# Patient Record
Sex: FEMALE | Race: WHITE | NOT HISPANIC OR LATINO | Employment: OTHER | URBAN - METROPOLITAN AREA
[De-identification: names, ages, dates, MRNs, and addresses within clinical notes are randomized per-mention and may not be internally consistent; named-entity substitution may affect disease eponyms.]

---

## 2020-07-10 ENCOUNTER — APPOINTMENT (OUTPATIENT)
Dept: RADIOLOGY | Facility: MEDICAL CENTER | Age: 65
End: 2020-07-10
Attending: EMERGENCY MEDICINE
Payer: MEDICARE

## 2020-07-10 ENCOUNTER — HOSPITAL ENCOUNTER (EMERGENCY)
Facility: MEDICAL CENTER | Age: 65
End: 2020-07-10
Attending: EMERGENCY MEDICINE
Payer: MEDICARE

## 2020-07-10 VITALS
HEART RATE: 84 BPM | BODY MASS INDEX: 32.52 KG/M2 | DIASTOLIC BLOOD PRESSURE: 79 MMHG | WEIGHT: 190.48 LBS | OXYGEN SATURATION: 93 % | SYSTOLIC BLOOD PRESSURE: 127 MMHG | HEIGHT: 64 IN | RESPIRATION RATE: 20 BRPM | TEMPERATURE: 97.8 F

## 2020-07-10 DIAGNOSIS — R55 SYNCOPE, UNSPECIFIED SYNCOPE TYPE: ICD-10-CM

## 2020-07-10 LAB
ANION GAP SERPL CALC-SCNC: 16 MMOL/L (ref 7–16)
BASOPHILS # BLD AUTO: 0.3 % (ref 0–1.8)
BASOPHILS # BLD: 0.02 K/UL (ref 0–0.12)
BUN SERPL-MCNC: 17 MG/DL (ref 8–22)
CALCIUM SERPL-MCNC: 9.9 MG/DL (ref 8.5–10.5)
CHLORIDE SERPL-SCNC: 105 MMOL/L (ref 96–112)
CO2 SERPL-SCNC: 23 MMOL/L (ref 20–33)
CREAT SERPL-MCNC: 0.7 MG/DL (ref 0.5–1.4)
EKG IMPRESSION: NORMAL
EOSINOPHIL # BLD AUTO: 0.1 K/UL (ref 0–0.51)
EOSINOPHIL NFR BLD: 1.7 % (ref 0–6.9)
ERYTHROCYTE [DISTWIDTH] IN BLOOD BY AUTOMATED COUNT: 46.4 FL (ref 35.9–50)
GLUCOSE SERPL-MCNC: 89 MG/DL (ref 65–99)
HCT VFR BLD AUTO: 47.6 % (ref 37–47)
HGB BLD-MCNC: 15.3 G/DL (ref 12–16)
IMM GRANULOCYTES # BLD AUTO: 0.02 K/UL (ref 0–0.11)
IMM GRANULOCYTES NFR BLD AUTO: 0.3 % (ref 0–0.9)
LYMPHOCYTES # BLD AUTO: 2.27 K/UL (ref 1–4.8)
LYMPHOCYTES NFR BLD: 37.8 % (ref 22–41)
MCH RBC QN AUTO: 29.4 PG (ref 27–33)
MCHC RBC AUTO-ENTMCNC: 32.1 G/DL (ref 33.6–35)
MCV RBC AUTO: 91.5 FL (ref 81.4–97.8)
MONOCYTES # BLD AUTO: 0.45 K/UL (ref 0–0.85)
MONOCYTES NFR BLD AUTO: 7.5 % (ref 0–13.4)
NEUTROPHILS # BLD AUTO: 3.15 K/UL (ref 2–7.15)
NEUTROPHILS NFR BLD: 52.4 % (ref 44–72)
NRBC # BLD AUTO: 0 K/UL
NRBC BLD-RTO: 0 /100 WBC
PLATELET # BLD AUTO: 258 K/UL (ref 164–446)
PMV BLD AUTO: 9.7 FL (ref 9–12.9)
POTASSIUM SERPL-SCNC: 4.1 MMOL/L (ref 3.6–5.5)
RBC # BLD AUTO: 5.2 M/UL (ref 4.2–5.4)
SODIUM SERPL-SCNC: 144 MMOL/L (ref 135–145)
WBC # BLD AUTO: 6 K/UL (ref 4.8–10.8)

## 2020-07-10 PROCEDURE — 93005 ELECTROCARDIOGRAM TRACING: CPT | Performed by: EMERGENCY MEDICINE

## 2020-07-10 PROCEDURE — 70498 CT ANGIOGRAPHY NECK: CPT

## 2020-07-10 PROCEDURE — 70496 CT ANGIOGRAPHY HEAD: CPT

## 2020-07-10 PROCEDURE — 700117 HCHG RX CONTRAST REV CODE 255: Performed by: EMERGENCY MEDICINE

## 2020-07-10 PROCEDURE — 93005 ELECTROCARDIOGRAM TRACING: CPT

## 2020-07-10 PROCEDURE — 99284 EMERGENCY DEPT VISIT MOD MDM: CPT

## 2020-07-10 PROCEDURE — 80048 BASIC METABOLIC PNL TOTAL CA: CPT

## 2020-07-10 PROCEDURE — 85025 COMPLETE CBC W/AUTO DIFF WBC: CPT

## 2020-07-10 RX ADMIN — IOHEXOL 80 ML: 350 INJECTION, SOLUTION INTRAVENOUS at 15:27

## 2020-07-10 NOTE — ED NOTES
"Chief Complaint   Patient presents with   • Loss of Vision     \" my eye loss pressure\" for 15seconds, described vision rolled and sideways both eyes   • Dizziness     during above vision problem     Pt ambulated to triage with above complaints happened 10:45. Pt asymptomatic now, she said only lasted for 15seconds. No other neurodeficit noted.   Pt feels anxious because last time this happed she had stroke and with loss vision her right eye.    "

## 2020-07-10 NOTE — ED NOTES
Pt clear for d/c. Educated on d/c instructions, verbalized understanding. No questions or concerned at time of d/c. Ambulatory, taken out of ED via wheelchair.

## 2020-07-10 NOTE — ED PROVIDER NOTES
"ED Provider Note    CHIEF COMPLAINT  Chief Complaint   Patient presents with   • Loss of Vision     \" my eye loss pressure\" for 15seconds, described vision rolled and sideways both eyes   • Dizziness     during above vision problem       HPI  Natividad Sewell is a 65 y.o. female who presents with an episode today where she briefly passed out.  Patient states she had just came inside her house and she felt like she got lightheaded and dizzy.  She went down on her hands and knees and thinks she briefly was unconscious.  She states that both eyes were losing vision at the same time.  She also described an unusual episode where her visual fields were rolling.  Patient denies vomiting or diarrhea.  Patient denies cough or cold symptoms.    REVIEW OF SYSTEMS  See HPI for further details.  No fever no chills.  No cough or cold symptoms.  No vomiting or diarrhea all other systems are negative.    PAST MEDICAL HISTORY  Past Medical History:   Diagnosis Date   • Arthritis    • Hypertension    • Migraines, neuralgic    • Mitral valve prolapse    • Stroke (HCC) 6/04/09       FAMILY HISTORY  History reviewed. No pertinent family history.    SOCIAL HISTORY  Social History     Socioeconomic History   • Marital status: Single     Spouse name: Not on file   • Number of children: Not on file   • Years of education: Not on file   • Highest education level: Not on file   Occupational History   • Not on file   Social Needs   • Financial resource strain: Not on file   • Food insecurity     Worry: Not on file     Inability: Not on file   • Transportation needs     Medical: Not on file     Non-medical: Not on file   Tobacco Use   • Smoking status: Current Every Day Smoker     Packs/day: 0.75     Years: 40.00     Pack years: 30.00     Types: Cigarettes   • Tobacco comment: 1/2 ppd    Substance and Sexual Activity   • Alcohol use: No   • Drug use: Yes     Types: Inhaled     Comment: marijuana   • Sexual activity: Not on file   Lifestyle   • " "Physical activity     Days per week: Not on file     Minutes per session: Not on file   • Stress: Not on file   Relationships   • Social connections     Talks on phone: Not on file     Gets together: Not on file     Attends Hoahaoism service: Not on file     Active member of club or organization: Not on file     Attends meetings of clubs or organizations: Not on file     Relationship status: Not on file   • Intimate partner violence     Fear of current or ex partner: Not on file     Emotionally abused: Not on file     Physically abused: Not on file     Forced sexual activity: Not on file   Other Topics Concern   • Not on file   Social History Narrative   • Not on file       SURGICAL HISTORY  Past Surgical History:   Procedure Laterality Date   • CAROTID ENDARTERECTOMY  6/6/2009    Performed by ALCIDES MERINO at SURGERY [facility]       CURRENT MEDICATIONS  Home Medications     Reviewed by Regi Hanks R.N. (Registered Nurse) on 07/10/20 at 1221  Med List Status: Complete   Medication Last Dose Status        Patient Shayan Taking any Medications                       ALLERGIES  Allergies   Allergen Reactions   • Morphine      \"I don't know, it was during my recovery after the endarterectomy-they told me I had a bad reaction to it\"       PHYSICAL EXAM  VITAL SIGNS: BP (!) 185/90   Pulse 92   Temp 36.6 °C (97.8 °F) (Temporal)   Resp 18   Ht 1.626 m (5' 4\")   Wt 86.4 kg (190 lb 7.6 oz)   SpO2 94%   BMI 32.70 kg/m²   Constitutional: Well developed, Well nourished, No acute distress,   HENT: Normocephalic, Atraumatic, Bilateral external ears normal, Oropharynx moist, No oral exudates, Nose normal.  Positive right carotid endarterectomy scar  Eyes: BHARTI, EOMI, Conjunctiva normal, No discharge.   Neck: Normal range of motion, No tenderness, Supple, No stridor. No nuchal rigidity.  Positive scars noted  Lymphatic: No lymphadenopathy noted.   Cardiovascular: Regular rate and rhythm without murmur rub or " gallop  Thorax & Lungs: Clear without wheezing  Abdomen: Bowel sounds normal, Soft, No tenderness, No masses, No pulsatile masses.   Skin: Warm, Dry, No erythema, No rash.   Back: No tenderness, No CVA tenderness.   Extremities: No edema, No tenderness, No cyanosis, No clubbing. Dorsalis pedis pulses 2+ equal bilaterally. Radial pulses 2+ equal bilaterally.  Neurologic: Alert & oriented x 3, Normal motor function, Normal sensory function, No focal deficits noted.     EKG  This rhythm at a rate of 75.  Normal P waves.  Normal QRS.  Normal R wave progression.  Borderline EKG    RADIOLOGY/PROCEDURES  CT-CTA HEAD WITH & W/O-POST PROCESS   Final Result      1.  CT angiogram of the [place] within normal limits.   2.  Moderate-sized area of encephalomalacia in the right cerebral artery territory consistent with chronic infarct.      CT-CTA NECK WITH & W/O-POST PROCESSING   Final Result      1.  There is atherosclerosis throughout both common carotid arteries and internal carotid artery origins with estimated 75% stenosis of the distal right common carotid artery just proximal to the bulb.   2.  There is estimated 50% stenosis of the right ICA origin and 50-69% stenosis of the left ICA origin.        This  Results for orders placed or performed during the hospital encounter of 07/10/20   CBC WITH DIFFERENTIAL   Result Value Ref Range    WBC 6.0 4.8 - 10.8 K/uL    RBC 5.20 4.20 - 5.40 M/uL    Hemoglobin 15.3 12.0 - 16.0 g/dL    Hematocrit 47.6 (H) 37.0 - 47.0 %    MCV 91.5 81.4 - 97.8 fL    MCH 29.4 27.0 - 33.0 pg    MCHC 32.1 (L) 33.6 - 35.0 g/dL    RDW 46.4 35.9 - 50.0 fL    Platelet Count 258 164 - 446 K/uL    MPV 9.7 9.0 - 12.9 fL    Neutrophils-Polys 52.40 44.00 - 72.00 %    Lymphocytes 37.80 22.00 - 41.00 %    Monocytes 7.50 0.00 - 13.40 %    Eosinophils 1.70 0.00 - 6.90 %    Basophils 0.30 0.00 - 1.80 %    Immature Granulocytes 0.30 0.00 - 0.90 %    Nucleated RBC 0.00 /100 WBC    Neutrophils (Absolute) 3.15  2.00 - 7.15 K/uL    Lymphs (Absolute) 2.27 1.00 - 4.80 K/uL    Monos (Absolute) 0.45 0.00 - 0.85 K/uL    Eos (Absolute) 0.10 0.00 - 0.51 K/uL    Baso (Absolute) 0.02 0.00 - 0.12 K/uL    Immature Granulocytes (abs) 0.02 0.00 - 0.11 K/uL    NRBC (Absolute) 0.00 K/uL   BASIC METABOLIC PANEL   Result Value Ref Range    Sodium 144 135 - 145 mmol/L    Potassium 4.1 3.6 - 5.5 mmol/L    Chloride 105 96 - 112 mmol/L    Co2 23 20 - 33 mmol/L    Glucose 89 65 - 99 mg/dL    Bun 17 8 - 22 mg/dL    Creatinine 0.70 0.50 - 1.40 mg/dL    Calcium 9.9 8.5 - 10.5 mg/dL    Anion Gap 16.0 7.0 - 16.0   ESTIMATED GFR   Result Value Ref Range    GFR If African American >60 >60 mL/min/1.73 m 2    GFR If Non African American >60 >60 mL/min/1.73 m 2   EKG   Result Value Ref Range    Report       [facility] Emergency Dept.    Test Date:  2020-07-10  Pt Name:    URBAN AGUIAR                  Department: ER  MRN:        [id number]                      Room:  Gender:     Female                       Technician: [id number]  :        [id number]                   Requested By:ER TRIAGE PROTOCOL  Order #:    [id number]                    Reading MD: BULMARO DALTON MD    Measurements  Intervals                                Axis  Rate:       76                           P:          51  DE:         148                          QRS:        54  QRSD:       72                           T:          46  QT:         372  QTc:        419    Interpretive Statements  SINUS RHYTHM  LEFT ATRIAL ABNORMALITY  Compared to ECG 03/15/2012 11:11:24  Atrial abnormality now present  Electronically Signed On 7- 16:30:12 PDT by BULMARO DALTON MD           COURSE & MEDICAL DECISION MAKING  Pertinent Labs & Imaging studies reviewed. (See chart for details)  Patient was concerned that she has had a recurrent stroke however her symptoms are more consistent with a syncope versus near syncopal episode.  CT scan did not show any signs of stroke.  There was  some nonsurgical carotid disease.  Patient continues to smoke and is no longer on antiplatelet agent.  Patient was started on a full dose aspirin once a day after consultation with Dr. Meyer the vascular surgeon.  He stated that he would see her next week.  I counseled the patient about smoking cessation.  Patient was discharged home in stable condition      FINAL IMPRESSION  1.  Syncope  2.   3.      Please note that this dictation was created using voice recognition software. I have worked with consultants from the vendor as well as technical experts from [facility] [employer] to optimize the interface. I have made every reasonable attempt to correct obvious errors, but I expect that there are errors of grammar and possibly content that I did not discover before finalizing the note.      Electronically signed by: Hank Chavez M.D., 7/10/2020 4:32 PM

## 2020-08-17 RX ORDER — SODIUM CHLORIDE 9 MG/ML
INJECTION, SOLUTION INTRAVENOUS CONTINUOUS
Status: CANCELLED | OUTPATIENT
Start: 2020-09-16

## 2020-09-11 ENCOUNTER — PRE-ADMISSION TESTING (OUTPATIENT)
Dept: ADMISSIONS | Facility: MEDICAL CENTER | Age: 65
End: 2020-09-11
Attending: SURGERY
Payer: MEDICARE

## 2020-09-11 DIAGNOSIS — Z01.812 PRE-OPERATIVE LABORATORY EXAMINATION: ICD-10-CM

## 2020-09-11 LAB
CREAT SERPL-MCNC: 0.54 MG/DL (ref 0.5–1.4)
ERYTHROCYTE [DISTWIDTH] IN BLOOD BY AUTOMATED COUNT: 45.4 FL (ref 35.9–50)
HCT VFR BLD AUTO: 47.5 % (ref 37–47)
HGB BLD-MCNC: 15.6 G/DL (ref 12–16)
INR PPP: 0.89 (ref 0.87–1.13)
MCH RBC QN AUTO: 29.7 PG (ref 27–33)
MCHC RBC AUTO-ENTMCNC: 32.8 G/DL (ref 33.6–35)
MCV RBC AUTO: 90.3 FL (ref 81.4–97.8)
PLATELET # BLD AUTO: 251 K/UL (ref 164–446)
PMV BLD AUTO: 10 FL (ref 9–12.9)
PROTHROMBIN TIME: 12.4 SEC (ref 12–14.6)
RBC # BLD AUTO: 5.26 M/UL (ref 4.2–5.4)
WBC # BLD AUTO: 6.1 K/UL (ref 4.8–10.8)

## 2020-09-11 PROCEDURE — 82565 ASSAY OF CREATININE: CPT

## 2020-09-11 PROCEDURE — 85027 COMPLETE CBC AUTOMATED: CPT

## 2020-09-11 PROCEDURE — 36415 COLL VENOUS BLD VENIPUNCTURE: CPT

## 2020-09-11 PROCEDURE — 85610 PROTHROMBIN TIME: CPT

## 2020-09-11 SDOH — HEALTH STABILITY: MENTAL HEALTH: HOW OFTEN DO YOU HAVE A DRINK CONTAINING ALCOHOL?: NEVER

## 2020-09-11 NOTE — OR NURSING
Patient here for PAT appointment and reports that she is not currently taking any medications.  She reports she got a call from a pharmacy last week to  plavix but she never picked it up.  I called MIRZA Reilly to update 9/11/2020 @ 1615.   reports that the patient must  prescription for Plavix and start taking as prescribed to proceed with procedure.  Patient given instructions.  Patient understands and reports she will  Plavix today.

## 2020-09-14 NOTE — PROGRESS NOTES
Pt contacted IR  stating that she did not  Plavix RX called in by Dr. Mukherjee and now pharmacy no longer has the medication for her.    Discussed loading and SE with pt. She denies history of bleeding episodes such as epistaxis, hematemesis, and melena. Explained she will need to load medications today due to short notice, and continue maintenance dosing with at least 3 months of dual antiplatelet therapy following planned stent placement on . She verbalized understanding.     Called in clopidogrel 75 m tables today, one tablet daily thereafter including on procedure day AND aspirin 81 mg tabs 4 today, and once daily thereafter including on procedure day. RF x 5 additional. Pharmacy: [facility] on [address]

## 2020-09-16 ENCOUNTER — HOSPITAL ENCOUNTER (OUTPATIENT)
Facility: MEDICAL CENTER | Age: 65
End: 2020-09-17
Attending: SURGERY | Admitting: SURGERY
Payer: MEDICARE

## 2020-09-16 ENCOUNTER — APPOINTMENT (OUTPATIENT)
Dept: RADIOLOGY | Facility: MEDICAL CENTER | Age: 65
End: 2020-09-16
Attending: SURGERY
Payer: MEDICARE

## 2020-09-16 DIAGNOSIS — I65.22 OCCLUSION OF LEFT CAROTID ARTERY: ICD-10-CM

## 2020-09-16 PROBLEM — E78.49 OTHER HYPERLIPIDEMIA: Status: ACTIVE | Noted: 2020-09-16

## 2020-09-16 PROBLEM — I65.21 CAROTID STENOSIS, RIGHT: Status: ACTIVE | Noted: 2020-09-16

## 2020-09-16 PROBLEM — I10 ESSENTIAL HYPERTENSION: Status: ACTIVE | Noted: 2020-09-16

## 2020-09-16 PROBLEM — I34.1 MITRAL VALVE PROLAPSE: Status: ACTIVE | Noted: 2020-09-16

## 2020-09-16 LAB
COVID ORDER STATUS COVID19: NORMAL
SARS-COV+SARS-COV-2 AG RESP QL IA.RAPID: NOTDETECTED
SPECIMEN SOURCE: NORMAL

## 2020-09-16 PROCEDURE — A9270 NON-COVERED ITEM OR SERVICE: HCPCS | Performed by: INTERNAL MEDICINE

## 2020-09-16 PROCEDURE — 160002 HCHG RECOVERY MINUTES (STAT)

## 2020-09-16 PROCEDURE — 99153 MOD SED SAME PHYS/QHP EA: CPT

## 2020-09-16 PROCEDURE — G0378 HOSPITAL OBSERVATION PER HR: HCPCS

## 2020-09-16 PROCEDURE — 99291 CRITICAL CARE FIRST HOUR: CPT | Performed by: INTERNAL MEDICINE

## 2020-09-16 PROCEDURE — 700117 HCHG RX CONTRAST REV CODE 255: Performed by: RADIOLOGY

## 2020-09-16 PROCEDURE — 700111 HCHG RX REV CODE 636 W/ 250 OVERRIDE (IP): Performed by: RADIOLOGY

## 2020-09-16 PROCEDURE — C9803 HOPD COVID-19 SPEC COLLECT: HCPCS | Performed by: INTERNAL MEDICINE

## 2020-09-16 PROCEDURE — 700105 HCHG RX REV CODE 258: Performed by: NURSE PRACTITIONER

## 2020-09-16 PROCEDURE — 87426 SARSCOV CORONAVIRUS AG IA: CPT

## 2020-09-16 PROCEDURE — 700102 HCHG RX REV CODE 250 W/ 637 OVERRIDE(OP): Performed by: INTERNAL MEDICINE

## 2020-09-16 PROCEDURE — 700111 HCHG RX REV CODE 636 W/ 250 OVERRIDE (IP)

## 2020-09-16 RX ORDER — SODIUM CHLORIDE 9 MG/ML
500 INJECTION, SOLUTION INTRAVENOUS
Status: ACTIVE | OUTPATIENT
Start: 2020-09-16 | End: 2020-09-16

## 2020-09-16 RX ORDER — BISACODYL 10 MG
10 SUPPOSITORY, RECTAL RECTAL
Status: DISCONTINUED | OUTPATIENT
Start: 2020-09-16 | End: 2020-09-17 | Stop reason: HOSPADM

## 2020-09-16 RX ORDER — AMOXICILLIN 250 MG
2 CAPSULE ORAL 2 TIMES DAILY
Status: DISCONTINUED | OUTPATIENT
Start: 2020-09-16 | End: 2020-09-17 | Stop reason: HOSPADM

## 2020-09-16 RX ORDER — PROMETHAZINE HYDROCHLORIDE 25 MG/1
12.5-25 TABLET ORAL EVERY 4 HOURS PRN
Status: DISCONTINUED | OUTPATIENT
Start: 2020-09-16 | End: 2020-09-17 | Stop reason: HOSPADM

## 2020-09-16 RX ORDER — POLYETHYLENE GLYCOL 3350 17 G/17G
1 POWDER, FOR SOLUTION ORAL
Status: DISCONTINUED | OUTPATIENT
Start: 2020-09-16 | End: 2020-09-17 | Stop reason: HOSPADM

## 2020-09-16 RX ORDER — HEPARIN SODIUM 1000 [USP'U]/ML
5000 INJECTION, SOLUTION INTRAVENOUS; SUBCUTANEOUS ONCE
Status: COMPLETED | OUTPATIENT
Start: 2020-09-16 | End: 2020-09-16

## 2020-09-16 RX ORDER — MIDAZOLAM HYDROCHLORIDE 1 MG/ML
INJECTION INTRAMUSCULAR; INTRAVENOUS
Status: COMPLETED
Start: 2020-09-16 | End: 2020-09-16

## 2020-09-16 RX ORDER — CLOPIDOGREL BISULFATE 75 MG/1
75 TABLET ORAL DAILY
Status: DISCONTINUED | OUTPATIENT
Start: 2020-09-17 | End: 2020-09-17 | Stop reason: HOSPADM

## 2020-09-16 RX ORDER — PROMETHAZINE HYDROCHLORIDE 25 MG/1
12.5-25 SUPPOSITORY RECTAL EVERY 4 HOURS PRN
Status: DISCONTINUED | OUTPATIENT
Start: 2020-09-16 | End: 2020-09-17 | Stop reason: HOSPADM

## 2020-09-16 RX ORDER — ACETAMINOPHEN 325 MG/1
650 TABLET ORAL EVERY 6 HOURS PRN
Status: DISCONTINUED | OUTPATIENT
Start: 2020-09-16 | End: 2020-09-17 | Stop reason: HOSPADM

## 2020-09-16 RX ORDER — ENALAPRILAT 1.25 MG/ML
1.25 INJECTION INTRAVENOUS EVERY 6 HOURS PRN
Status: DISCONTINUED | OUTPATIENT
Start: 2020-09-16 | End: 2020-09-17 | Stop reason: HOSPADM

## 2020-09-16 RX ORDER — MIDAZOLAM HYDROCHLORIDE 1 MG/ML
.5-2 INJECTION INTRAMUSCULAR; INTRAVENOUS PRN
Status: ACTIVE | OUTPATIENT
Start: 2020-09-16 | End: 2020-09-16

## 2020-09-16 RX ORDER — CLOPIDOGREL BISULFATE 75 MG/1
75 TABLET ORAL DAILY
COMMUNITY
End: 2023-02-14

## 2020-09-16 RX ORDER — PROCHLORPERAZINE EDISYLATE 5 MG/ML
5-10 INJECTION INTRAMUSCULAR; INTRAVENOUS EVERY 4 HOURS PRN
Status: DISCONTINUED | OUTPATIENT
Start: 2020-09-16 | End: 2020-09-17 | Stop reason: HOSPADM

## 2020-09-16 RX ORDER — ONDANSETRON 2 MG/ML
4 INJECTION INTRAMUSCULAR; INTRAVENOUS EVERY 4 HOURS PRN
Status: DISCONTINUED | OUTPATIENT
Start: 2020-09-16 | End: 2020-09-17 | Stop reason: HOSPADM

## 2020-09-16 RX ORDER — ONDANSETRON 2 MG/ML
4 INJECTION INTRAMUSCULAR; INTRAVENOUS PRN
Status: ACTIVE | OUTPATIENT
Start: 2020-09-16 | End: 2020-09-16

## 2020-09-16 RX ORDER — HEPARIN SODIUM,PORCINE 1000/ML
VIAL (ML) INJECTION
Status: COMPLETED
Start: 2020-09-16 | End: 2020-09-16

## 2020-09-16 RX ORDER — LABETALOL HYDROCHLORIDE 5 MG/ML
10 INJECTION, SOLUTION INTRAVENOUS EVERY 4 HOURS PRN
Status: DISCONTINUED | OUTPATIENT
Start: 2020-09-16 | End: 2020-09-17 | Stop reason: HOSPADM

## 2020-09-16 RX ORDER — ONDANSETRON 4 MG/1
4 TABLET, ORALLY DISINTEGRATING ORAL EVERY 4 HOURS PRN
Status: DISCONTINUED | OUTPATIENT
Start: 2020-09-16 | End: 2020-09-17 | Stop reason: HOSPADM

## 2020-09-16 RX ORDER — SODIUM CHLORIDE 9 MG/ML
INJECTION, SOLUTION INTRAVENOUS CONTINUOUS
Status: DISCONTINUED | OUTPATIENT
Start: 2020-09-16 | End: 2020-09-17

## 2020-09-16 RX ADMIN — HEPARIN SODIUM 5000 UNITS: 1000 INJECTION, SOLUTION INTRAVENOUS; SUBCUTANEOUS at 14:46

## 2020-09-16 RX ADMIN — MIDAZOLAM HYDROCHLORIDE 1 MG: 1 INJECTION, SOLUTION INTRAMUSCULAR; INTRAVENOUS at 14:21

## 2020-09-16 RX ADMIN — MIDAZOLAM HYDROCHLORIDE 1 MG: 1 INJECTION, SOLUTION INTRAMUSCULAR; INTRAVENOUS at 14:13

## 2020-09-16 RX ADMIN — SODIUM CHLORIDE: 9 INJECTION, SOLUTION INTRAVENOUS at 12:40

## 2020-09-16 RX ADMIN — MIDAZOLAM HYDROCHLORIDE 1 MG: 1 INJECTION, SOLUTION INTRAMUSCULAR; INTRAVENOUS at 14:51

## 2020-09-16 RX ADMIN — SODIUM CHLORIDE: 9 INJECTION, SOLUTION INTRAVENOUS at 19:40

## 2020-09-16 RX ADMIN — FENTANYL CITRATE 50 MCG: 50 INJECTION INTRAMUSCULAR; INTRAVENOUS at 14:13

## 2020-09-16 RX ADMIN — ACETAMINOPHEN 650 MG: 325 TABLET, FILM COATED ORAL at 22:56

## 2020-09-16 RX ADMIN — IOHEXOL 120 ML: 300 INJECTION, SOLUTION INTRAVENOUS at 15:56

## 2020-09-16 SDOH — ECONOMIC STABILITY: FOOD INSECURITY: WITHIN THE PAST 12 MONTHS, THE FOOD YOU BOUGHT JUST DIDN'T LAST AND YOU DIDN'T HAVE MONEY TO GET MORE.: PATIENT DECLINED

## 2020-09-16 SDOH — ECONOMIC STABILITY: TRANSPORTATION INSECURITY
IN THE PAST 12 MONTHS, HAS LACK OF TRANSPORTATION KEPT YOU FROM MEETINGS, WORK, OR FROM GETTING THINGS NEEDED FOR DAILY LIVING?: PATIENT DECLINED

## 2020-09-16 SDOH — ECONOMIC STABILITY: FOOD INSECURITY: WITHIN THE PAST 12 MONTHS, YOU WORRIED THAT YOUR FOOD WOULD RUN OUT BEFORE YOU GOT MONEY TO BUY MORE.: PATIENT DECLINED

## 2020-09-16 SDOH — ECONOMIC STABILITY: TRANSPORTATION INSECURITY
IN THE PAST 12 MONTHS, HAS THE LACK OF TRANSPORTATION KEPT YOU FROM MEDICAL APPOINTMENTS OR FROM GETTING MEDICATIONS?: PATIENT DECLINED

## 2020-09-16 ASSESSMENT — PATIENT HEALTH QUESTIONNAIRE - PHQ9
3. TROUBLE FALLING OR STAYING ASLEEP OR SLEEPING TOO MUCH: NOT AT ALL
4. FEELING TIRED OR HAVING LITTLE ENERGY: NOT AT ALL
1. LITTLE INTEREST OR PLEASURE IN DOING THINGS: NOT AT ALL
6. FEELING BAD ABOUT YOURSELF - OR THAT YOU ARE A FAILURE OR HAVE LET YOURSELF OR YOUR FAMILY DOWN: NOT AL ALL
8. MOVING OR SPEAKING SO SLOWLY THAT OTHER PEOPLE COULD HAVE NOTICED. OR THE OPPOSITE, BEING SO FIGETY OR RESTLESS THAT YOU HAVE BEEN MOVING AROUND A LOT MORE THAN USUAL: NOT AT ALL
SUM OF ALL RESPONSES TO PHQ9 QUESTIONS 1 AND 2: 2
2. FEELING DOWN, DEPRESSED, IRRITABLE, OR HOPELESS: MORE THAN HALF THE DAYS
9. THOUGHTS THAT YOU WOULD BE BETTER OFF DEAD, OR OF HURTING YOURSELF: NOT AT ALL
5. POOR APPETITE OR OVEREATING: NOT AT ALL
SUM OF ALL RESPONSES TO PHQ QUESTIONS 1-9: 2
7. TROUBLE CONCENTRATING ON THINGS, SUCH AS READING THE NEWSPAPER OR WATCHING TELEVISION: NOT AT ALL

## 2020-09-16 ASSESSMENT — COGNITIVE AND FUNCTIONAL STATUS - GENERAL
DAILY ACTIVITIY SCORE: 24
MOBILITY SCORE: 24
SUGGESTED CMS G CODE MODIFIER DAILY ACTIVITY: CH
SUGGESTED CMS G CODE MODIFIER MOBILITY: CH

## 2020-09-16 ASSESSMENT — LIFESTYLE VARIABLES
TOTAL SCORE: 0
TOTAL SCORE: 0
HOW MANY TIMES IN THE PAST YEAR HAVE YOU HAD 5 OR MORE DRINKS IN A DAY: 0
CONSUMPTION TOTAL: NEGATIVE
EVER FELT BAD OR GUILTY ABOUT YOUR DRINKING: NO
ON A TYPICAL DAY WHEN YOU DRINK ALCOHOL HOW MANY DRINKS DO YOU HAVE: 0
HAVE YOU EVER FELT YOU SHOULD CUT DOWN ON YOUR DRINKING: NO
DOES PATIENT WANT TO STOP DRINKING: NO
HAVE PEOPLE ANNOYED YOU BY CRITICIZING YOUR DRINKING: NO
EVER HAD A DRINK FIRST THING IN THE MORNING TO STEADY YOUR NERVES TO GET RID OF A HANGOVER: NO
ALCOHOL_USE: NO
TOTAL SCORE: 0
ALCOHOL_USE: NO
AVERAGE NUMBER OF DAYS PER WEEK YOU HAVE A DRINK CONTAINING ALCOHOL: 0

## 2020-09-16 ASSESSMENT — ENCOUNTER SYMPTOMS
HEARTBURN: 0
COUGH: 0
SPUTUM PRODUCTION: 0
STRIDOR: 0
NAUSEA: 0
NECK PAIN: 0
FEVER: 0
SORE THROAT: 0
SHORTNESS OF BREATH: 0
PALPITATIONS: 0
BLURRED VISION: 0
HEMOPTYSIS: 0
CHILLS: 0

## 2020-09-16 NOTE — OR NURSING
Awake, alert and sipping fluids.  Vitals stable.  On monitors with alarms audible.    Paged Dr. Regalado for admission orders.

## 2020-09-16 NOTE — PROGRESS NOTES
IR RN note:     Cerebral angiogram and right carotid stent placement by MD Dennis       Sedation given per provider direction. Patient appeared to tolerate procedure, patient awake and talking post procedure.    Report given to ACACIA Barrett. Pt transported to PACU via IR RN, then transferred care.      Right carotid stent    Xact 9-7mm x 40mm  REF: [id number] LOT: [id number] EXP 04-    Right and left femoral arterial access sites, pressure held, covered with gauze/tegerderm

## 2020-09-16 NOTE — H&P
History and Physical    Date: 9/16/2020    PCP: Pcp Pt States None        HPI: This is a 65 y.o. female who is presenting with recurrent right carotid stenosis following CEA-2009.H/o stroke.    Past Medical History:   Diagnosis Date   • Arthritis    • Dental disorder     upper/lowers   • High cholesterol    • Hypertension     pt denies- not taking any medication currently   • Migraines, neuralgic    • Mitral valve prolapse    • Stroke (HCC) 6/04/09    no residual weaknesses or problems       Past Surgical History:   Procedure Laterality Date   • CAROTID ENDARTERECTOMY  6/6/2009    Performed by ALCIDES MERINO at [facility] ORS   • GYN SURGERY Bilateral     tubal ligation       Current Facility-Administered Medications   Medication Dose Route Frequency Provider Last Rate Last Dose   • NS infusion   Intravenous Continuous  YENI Alvarado, A.P.N. 125 mL/hr at 09/16/20 1240          Social History     Socioeconomic History   • Marital status: Single     Spouse name: Not on file   • Number of children: Not on file   • Years of education: Not on file   • Highest education level: Not on file   Occupational History   • Not on file   Social Needs   • Financial resource strain: Not on file   • Food insecurity     Worry: Not on file     Inability: Not on file   • Transportation needs     Medical: Not on file     Non-medical: Not on file   Tobacco Use   • Smoking status: Current Every Day Smoker     Packs/day: 1.00     Years: 40.00     Pack years: 40.00     Types: Cigarettes     Start date: 9/16/1980   • Smokeless tobacco: Never Used   Substance and Sexual Activity   • Alcohol use: No     Frequency: Never   • Drug use: Yes     Types: Inhaled     Comment: marijuana, last use 9/14/2020   • Sexual activity: Not on file   Lifestyle   • Physical activity     Days per week: Not on file     Minutes per session: Not on file   • Stress: Not on file   Relationships   • Social connections     Talks on phone: Not on file     Gets  together: Not on file     Attends Restorationism service: Not on file     Active member of club or organization: Not on file     Attends meetings of clubs or organizations: Not on file     Relationship status: Not on file   • Intimate partner violence     Fear of current or ex partner: Not on file     Emotionally abused: Not on file     Physically abused: Not on file     Forced sexual activity: Not on file   Other Topics Concern   • Not on file   Social History Narrative   • Not on file       History reviewed. No pertinent family history.    Allergies:  Morphine    Review of Systems:  CEA  H/O stroke    Physical Exam:    alert and oriented    Speech is clear    No gross motor deficits.    Vital Signs  Blood Pressure: 151/69   Temperature: 36.3 °C (97.4 °F)   Pulse: 67   Respiration: 20   Pulse Oximetry: 96 %        Labs:                    Radiology:  No orders to display             Assessment and Plan:This is a 65 y.o.with recurrent right carotid stenosis    Plan: diagnostic cerebral angiogram and right carotid stent placement

## 2020-09-16 NOTE — CONSULTS
Critical Care Consultation    Date of consult: 9/16/2020    Referring Physician  Bull Dennis M.D.    Reason for Consultation  Postoperative care after right carotid artery stent placement today    History of Presenting Illness  65 y.o. female who presented 9/16/2020 with for elective right carotid artery stent for recurrent carotid artery stenosis.  She has a history of right hemispheric stroke and subsequent right carotid thromboendarterectomy with Dacron patch angioplasty by Dr. Gutierrez in 2009.  Additionally she has a history of hyperlipidemia, hypertension, migraine headaches and mitral valve prolapse.  She is a current 1 pack a day smoker.  She did not have her preoperative COVID screening and rapid SARS-CoV-2 PCR will be sent now.  She is asymptomatic.    Code Status  Full Code    Review of Systems  Review of Systems   Constitutional: Negative for chills and fever.   HENT: Negative for sore throat.    Eyes: Negative for blurred vision.   Respiratory: Negative for cough, hemoptysis, sputum production, shortness of breath and stridor.    Cardiovascular: Negative for chest pain and palpitations.   Gastrointestinal: Negative for heartburn and nausea.   Genitourinary: Negative for dysuria.   Musculoskeletal: Negative for neck pain.       Past Medical History   has a past medical history of Arthritis, Dental disorder, High cholesterol, Hypertension, Migraines, neuralgic, Mitral valve prolapse, and Stroke (HCC) (6/04/09).    Surgical History   has a past surgical history that includes carotid endarterectomy (6/6/2009) and gyn surgery (Bilateral).    Family History  family history is not on file.    Social History   reports that she has been smoking cigarettes. She started smoking about 40 years ago. She has a 40.00 pack-year smoking history. She has never used smokeless tobacco. She reports current drug use. Drug: Inhaled. She reports that she does not drink alcohol.    Medications  Home Medications      "Reviewed by Vinita Olmos R.N. (Registered Nurse) on 09/16/20 at 1221  Med List Status: Complete   Medication Last Dose Status   aspirin EC (ECOTRIN) 81 MG Tablet Delayed Response 9/16/2020 Active   clopidogrel (PLAVIX) 75 MG Tab 9/16/2020 Active              Current Facility-Administered Medications   Medication Dose Route Frequency Provider Last Rate Last Dose   • NS infusion   Intravenous Continuous  YENI Alvarado A.P.N. 125 mL/hr at 09/16/20 1240     • NS (BOLUS) infusion 500 mL  500 mL Intravenous Once PRN Bull Dennis M.D.       • fentaNYL (SUBLIMAZE) injection 12.5-50 mcg  12.5-50 mcg Intravenous PRN Bull Dennis M.D.   50 mcg at 09/16/20 1413   • midazolam (VERSED) injection 0.5-2 mg  0.5-2 mg Intravenous PRN Bull Dennis M.D.   1 mg at 09/16/20 1451   • ondansetron (ZOFRAN) syringe/vial injection 4 mg  4 mg Intravenous PRN Bull Dennis M.D.       • [START ON 9/17/2020] clopidogrel (PLAVIX) tablet 75 mg  75 mg Oral DAILY Bull Dennis M.D.       • [START ON 9/17/2020] aspirin EC (ECOTRIN) tablet 81 mg  81 mg Oral DAILY Bull Dennis M.D.           Allergies  Allergies   Allergen Reactions   • Morphine      \"I don't know, it was during my recovery after the endarterectomy-they told me I had a bad reaction to it\"       Vital Signs last 24 hours  Temp:  [36.3 °C (97.4 °F)-36.5 °C (97.7 °F)] 36.5 °C (97.7 °F)  Pulse:  [54-68] 60  Resp:  [14-28] 20  BP: (112-193)/(55-96) 138/68  SpO2:  [91 %-100 %] 93 %    Physical Exam  Physical Exam  Vitals signs and nursing note reviewed.   Constitutional:       Appearance: Normal appearance.   HENT:      Head: Normocephalic and atraumatic.      Nose: Nose normal.      Mouth/Throat:      Mouth: Mucous membranes are moist.   Eyes:      Extraocular Movements: Extraocular movements intact.      Pupils: Pupils are equal, round, and reactive to light.   Neck:      Musculoskeletal: Neck supple. No neck rigidity.   Cardiovascular:      Rate and " Rhythm: Normal rate and regular rhythm.      Pulses: Normal pulses.   Pulmonary:      Effort: Pulmonary effort is normal. No respiratory distress.      Breath sounds: Normal breath sounds. No wheezing.   Abdominal:      General: There is no distension.      Palpations: Abdomen is soft.      Tenderness: There is no abdominal tenderness. There is no guarding.   Musculoskeletal:         General: No swelling.   Skin:     General: Skin is warm and dry.      Capillary Refill: Capillary refill takes less than 2 seconds.      Coloration: Skin is not jaundiced.   Neurological:      General: No focal deficit present.      Mental Status: She is alert and oriented to person, place, and time.      Cranial Nerves: No cranial nerve deficit.   Psychiatric:         Mood and Affect: Mood normal.         Fluids  No intake or output data in the 24 hours ending 09/16/20 1611    Laboratory  No results found for this or any previous visit (from the past 48 hour(s)).    Imaging  IR-CAROTID-CEREBRAL BILATERAL    (Results Pending)       Assessment/Plan  * Carotid stenosis, right  Assessment & Plan  Recurrent carotid artery stenosis after prior thromboendarterectomy and Dacron patch angioplasty 2009  Right carotid artery stent placed in IR 9/16  Continue DAPT, ASA and Plavix loaded preoperatively  Monitor postoperatively in ICU, frequent neuro checks, blood pressure control    Mitral valve prolapse  Assessment & Plan  By history    Other hyperlipidemia  Assessment & Plan  Not on statin therapy  Follow-up lipid panel with a.m. labs    Essential hypertension  Assessment & Plan  Pain control, blood pressure control, SBP < 165      Discussed patient condition and risk of morbidity and/or mortality with RN, Patient and Interventional radiology.    The patient remains critically ill.  Critical care time = 33 minutes in directly providing and coordinating critical care and extensive data review.  No time overlap and excludes procedures.

## 2020-09-17 VITALS
BODY MASS INDEX: 31.84 KG/M2 | HEIGHT: 64 IN | WEIGHT: 186.51 LBS | SYSTOLIC BLOOD PRESSURE: 112 MMHG | RESPIRATION RATE: 30 BRPM | OXYGEN SATURATION: 96 % | TEMPERATURE: 98.4 F | HEART RATE: 58 BPM | DIASTOLIC BLOOD PRESSURE: 62 MMHG

## 2020-09-17 DIAGNOSIS — I65.21 CAROTID STENOSIS, RIGHT: ICD-10-CM

## 2020-09-17 LAB
ANION GAP SERPL CALC-SCNC: 10 MMOL/L (ref 7–16)
BASOPHILS # BLD AUTO: 0.3 % (ref 0–1.8)
BASOPHILS # BLD: 0.02 K/UL (ref 0–0.12)
BUN SERPL-MCNC: 13 MG/DL (ref 8–22)
CALCIUM SERPL-MCNC: 8.1 MG/DL (ref 8.5–10.5)
CHLORIDE SERPL-SCNC: 108 MMOL/L (ref 96–112)
CHOLEST SERPL-MCNC: 220 MG/DL (ref 100–199)
CO2 SERPL-SCNC: 20 MMOL/L (ref 20–33)
CREAT SERPL-MCNC: 0.46 MG/DL (ref 0.5–1.4)
EOSINOPHIL # BLD AUTO: 0.07 K/UL (ref 0–0.51)
EOSINOPHIL NFR BLD: 1.1 % (ref 0–6.9)
ERYTHROCYTE [DISTWIDTH] IN BLOOD BY AUTOMATED COUNT: 46.8 FL (ref 35.9–50)
GLUCOSE SERPL-MCNC: 105 MG/DL (ref 65–99)
HCT VFR BLD AUTO: 39.7 % (ref 37–47)
HDLC SERPL-MCNC: 42 MG/DL
HGB BLD-MCNC: 12.9 G/DL (ref 12–16)
IMM GRANULOCYTES # BLD AUTO: 0.01 K/UL (ref 0–0.11)
IMM GRANULOCYTES NFR BLD AUTO: 0.2 % (ref 0–0.9)
LDLC SERPL CALC-MCNC: 139 MG/DL
LYMPHOCYTES # BLD AUTO: 2.24 K/UL (ref 1–4.8)
LYMPHOCYTES NFR BLD: 35.5 % (ref 22–41)
MAGNESIUM SERPL-MCNC: 2 MG/DL (ref 1.5–2.5)
MCH RBC QN AUTO: 29.7 PG (ref 27–33)
MCHC RBC AUTO-ENTMCNC: 32.5 G/DL (ref 33.6–35)
MCV RBC AUTO: 91.3 FL (ref 81.4–97.8)
MONOCYTES # BLD AUTO: 0.51 K/UL (ref 0–0.85)
MONOCYTES NFR BLD AUTO: 8.1 % (ref 0–13.4)
NEUTROPHILS # BLD AUTO: 3.46 K/UL (ref 2–7.15)
NEUTROPHILS NFR BLD: 54.8 % (ref 44–72)
NRBC # BLD AUTO: 0 K/UL
NRBC BLD-RTO: 0 /100 WBC
PLATELET # BLD AUTO: 210 K/UL (ref 164–446)
PMV BLD AUTO: 9.9 FL (ref 9–12.9)
POTASSIUM SERPL-SCNC: 4.2 MMOL/L (ref 3.6–5.5)
RBC # BLD AUTO: 4.35 M/UL (ref 4.2–5.4)
SODIUM SERPL-SCNC: 138 MMOL/L (ref 135–145)
TRIGL SERPL-MCNC: 194 MG/DL (ref 0–149)
WBC # BLD AUTO: 6.3 K/UL (ref 4.8–10.8)

## 2020-09-17 PROCEDURE — 96374 THER/PROPH/DIAG INJ IV PUSH: CPT

## 2020-09-17 PROCEDURE — A9270 NON-COVERED ITEM OR SERVICE: HCPCS | Performed by: INTERNAL MEDICINE

## 2020-09-17 PROCEDURE — 83735 ASSAY OF MAGNESIUM: CPT

## 2020-09-17 PROCEDURE — 85025 COMPLETE CBC W/AUTO DIFF WBC: CPT

## 2020-09-17 PROCEDURE — 700111 HCHG RX REV CODE 636 W/ 250 OVERRIDE (IP): Performed by: INTERNAL MEDICINE

## 2020-09-17 PROCEDURE — 97165 OT EVAL LOW COMPLEX 30 MIN: CPT

## 2020-09-17 PROCEDURE — 700102 HCHG RX REV CODE 250 W/ 637 OVERRIDE(OP): Performed by: INTERNAL MEDICINE

## 2020-09-17 PROCEDURE — 700102 HCHG RX REV CODE 250 W/ 637 OVERRIDE(OP): Performed by: RADIOLOGY

## 2020-09-17 PROCEDURE — 97161 PT EVAL LOW COMPLEX 20 MIN: CPT

## 2020-09-17 PROCEDURE — 80061 LIPID PANEL: CPT

## 2020-09-17 PROCEDURE — A9270 NON-COVERED ITEM OR SERVICE: HCPCS | Performed by: RADIOLOGY

## 2020-09-17 PROCEDURE — G0378 HOSPITAL OBSERVATION PER HR: HCPCS

## 2020-09-17 PROCEDURE — 99217 PR OBSERVATION CARE DISCHARGE: CPT | Performed by: INTERNAL MEDICINE

## 2020-09-17 PROCEDURE — 80048 BASIC METABOLIC PNL TOTAL CA: CPT

## 2020-09-17 RX ADMIN — CLOPIDOGREL BISULFATE 75 MG: 75 TABLET ORAL at 05:37

## 2020-09-17 RX ADMIN — ASPIRIN 81 MG: 81 TABLET, COATED ORAL at 05:37

## 2020-09-17 RX ADMIN — DOCUSATE SODIUM 50 MG AND SENNOSIDES 8.6 MG 2 TABLET: 8.6; 5 TABLET, FILM COATED ORAL at 10:33

## 2020-09-17 RX ADMIN — ONDANSETRON 4 MG: 2 INJECTION INTRAMUSCULAR; INTRAVENOUS at 08:51

## 2020-09-17 ASSESSMENT — COGNITIVE AND FUNCTIONAL STATUS - GENERAL
SUGGESTED CMS G CODE MODIFIER MOBILITY: CH
DAILY ACTIVITIY SCORE: 24
SUGGESTED CMS G CODE MODIFIER DAILY ACTIVITY: CH
MOBILITY SCORE: 24

## 2020-09-17 ASSESSMENT — ACTIVITIES OF DAILY LIVING (ADL): TOILETING: INDEPENDENT

## 2020-09-17 ASSESSMENT — GAIT ASSESSMENTS
DISTANCE (FEET): 200
GAIT LEVEL OF ASSIST: SUPERVISED
ASSISTIVE DEVICE: SINGLE POINT CANE

## 2020-09-17 NOTE — CARE PLAN
Problem: Venous Thromboembolism (VTW)/Deep Vein Thrombosis (DVT) Prevention:  Goal: Patient will participate in Venous Thrombosis (VTE)/Deep Vein Thrombosis (DVT)Prevention Measures  Flowsheets  Taken 9/17/2020 1533  SCDs, Sequential Compression Device: On  Taken 9/17/2020 0800  Mechanical Prophylaxis: SCDs, Sequential Compression Device   SCDS on while in bed this shift.   Problem: Safety  Goal: Will remain free from falls  Outcome: PROGRESSING AS EXPECTED    Pt educated on calling before getting OOB, assisted to commode throughout shift. PT/OT eval ordered and completed.

## 2020-09-17 NOTE — PROGRESS NOTES
Pt arrived from PACU. VSS. Neuro intact. Discussed poc and unit routine. All questions/concerns addressed. +1 bilat DP pulses. Bilat groin dressings dry/intact, dried drainage.

## 2020-09-17 NOTE — DISCHARGE SUMMARY
Discharge Summary    CHIEF COMPLAINT ON ADMISSION  No chief complaint on file.      Reason for Admission  I65.22     Admission Date  9/16/2020    CODE STATUS  Full Code    HPI & HOSPITAL COURSE  Ms. Natividad Sewell is a 65 y.o. female with history of hypertension, hyperlipidemia, migraines, mitral prolapse right carotid artery stenosis status post carotid endarterectomy with Dacron patch angioplasty by Dr. Gutierrez in 2009, right hemispheric stroke who presented on 9/16/2020 with elective right carotid artery stent for recurrent carotid artery stenosis.  Status post right carotid stent placement on 9/16 by Dr. Bull Dennis M.D..    Patient tolerated the procedure well with no complications.  She was admitted to the ICU postprocedure for neurologic monitoring.  Patient was cleared by interventional radiology.  PT/OT felt she had no needs.  She will be discharged to continue on dual antiplatelet therapy for 6 months.  Follow-up with interventional radiology in 2 weeks.       Therefore, she is discharged in fair and stable condition to home with close outpatient follow-up.    The patient recovered much more quickly than anticipated on admission.    Discharge Date  9/17/2020    FOLLOW UP ITEMS POST DISCHARGE  None    DISCHARGE DIAGNOSES  Principal Problem (Resolved):    Carotid stenosis, right POA: Yes  Active Problems:    Essential hypertension POA: Yes    Other hyperlipidemia POA: Yes    Mitral valve prolapse POA: Yes      FOLLOW UP  Bull Dennis M.D.  [address] [facility]  [address]  [facility] [address]  [phone]    In 2 weeks  As needed, If symptoms worsen      MEDICATIONS ON DISCHARGE     Medication List      CONTINUE taking these medications      Instructions   aspirin EC 81 MG Tbec  Commonly known as: ECOTRIN   Take 81 mg by mouth every day. Indications: Excision of Thickened Areas Within the Carotid Arteries  Dose: 81 mg     clopidogrel 75 MG Tabs  Commonly known as: PLAVIX   Take 75 mg by mouth every  "day. Indications: Carotid Artery Stenting  Dose: 75 mg            Allergies  Allergies   Allergen Reactions   • Morphine      \"I don't know, it was during my recovery after the endarterectomy-they told me I had a bad reaction to it\"       DIET  Orders Placed This Encounter   Procedures   • Diet Order Regular     Standing Status:   Standing     Number of Occurrences:   1     Order Specific Question:   Diet:     Answer:   Regular [1]       ACTIVITY  As tolerated.  Weight bearing as tolerated    CONSULTATIONS  IR  Intensivist    PROCEDURES  Right carotid stent    LABORATORY  Lab Results   Component Value Date    SODIUM 138 09/17/2020    POTASSIUM 4.2 09/17/2020    CHLORIDE 108 09/17/2020    CO2 20 09/17/2020    GLUCOSE 105 (H) 09/17/2020    BUN 13 09/17/2020    CREATININE 0.46 (L) 09/17/2020        Lab Results   Component Value Date    WBC 6.3 09/17/2020    HEMOGLOBIN 12.9 09/17/2020    HEMATOCRIT 39.7 09/17/2020    PLATELETCT 210 09/17/2020        I discussed medications and side effects with the patient.  I discussed prognosis and importance of medical compliance with the patient.  I counseled the patient about diet, exercise, weight loss, smoking cessation, and life style modifications.  All questions and concerns have been addressed.  Total time of the discharge process was 36 minutes.  "

## 2020-09-17 NOTE — PROGRESS NOTES
Discharge note    Discharge orders written by Dr. Barraza. Reviewed discharge instructions and medications with patient, pt expressed understanding. Copy of discharge paperwork with patient. IV removed. Pt dressed, escorted to exit by wheelchair, pts friend Brandon to transport home.

## 2020-09-17 NOTE — THERAPY
Occupational Therapy   Initial Evaluation     Patient Name: Natividad Sewell  Age:  65 y.o., Sex:  female  Medical Record #: [id number]  Today's Date: 9/17/2020     Precautions  Precautions: Fall Risk    Assessment  Patient is 65 y.o. female with a diagnosis of Carotid stenosis. Pt is at or near his/her functional baseline. Pt with no further skilled OT needs in the acute care setting at this time.      Plan     Occupational Therapy for Evaluation only      Discharge Recommendations: (P) Anticipate that the patient will have no further occupational therapy needs after discharge from the hospital        09/17/20 1226   Prior Living Situation   Prior Services None   Housing / Facility 1 Story Apartment / Condo   Steps Into Home 0   Steps In Home 0   Equipment Owned Single Point Cane   Lives with - Patient's Self Care Capacity Alone and Able to Care For Self   Prior Level of ADL Function   Self Feeding Independent   Grooming / Hygiene Independent   Bathing Independent   Dressing Independent   Toileting Independent   ADL Assessment   Grooming Supervision   Upper Body Dressing Supervision   Lower Body Dressing Supervision   Toileting Supervision   Functional Mobility   Sit to Stand Supervised   Bed, Chair, Wheelchair Transfer Supervised

## 2020-09-17 NOTE — PROGRESS NOTES
S/p uncomplicated endovascular neurointervention on 9/16/20 by Bull Dennis MD (IR, x [phone]): right carotid artery stent placement. Admitted to ICU for post procedure neurologic monitoring.     Today, the patient complains of tiredness. Denies vision change and weakness.Ate dinner last night with no vomiting.   Drowsy, oriented x 4. Face symmetric, speech fluent. No drift. Right femoral angio site with mild ecchymosis and tenderness. No active oozing noted.   From a NeuroInterventional Service standpoint, OK to discharge to home when medically cleared by Hospitalist Service. Patient to follow up in our clinic in approximately 2 weeks, our office to arrange appointment. Does need to continue aspirin and Plavix for 6 months from our standpoint. Medication refills called in on 9/14/20 by us.    Post-angioseal instructions: no lifting greater than 5 lbs and no baths/ swimming/ soaking in tub for 5 days. Shower OK. OK to change dressings to band aid as needed.

## 2020-09-17 NOTE — DISCHARGE INSTRUCTIONS
Discharge Instructions    Discharged to home by car with friend. Discharged via wheelchair, hospital escort: Yes.  Special equipment needed: Not Applicable    Be sure to schedule a follow-up appointment with your primary care doctor or any specialists as instructed.     Discharge Plan:   Diet Plan: Discussed  Activity Level: Discussed  Confirmed Follow up Appointment: Patient to Call and Schedule Appointment  Confirmed Symptoms Management: Discussed  Medication Reconciliation Updated: Yes    I understand that a diet low in cholesterol, fat, and sodium is recommended for good health. Unless I have been given specific instructions below for another diet, I accept this instruction as my diet prescription.   Other diet: see MD's order    Special Instructions: None    · Is patient discharged on Warfarin / Coumadin?   No     Depression / Suicide Risk    As you are discharged from this [facility] facility, it is important to learn how to keep safe from harming yourself.    Recognize the warning signs:  · Abrupt changes in personality, positive or negative- including increase in energy   · Giving away possessions  · Change in eating patterns- significant weight changes-  positive or negative  · Change in sleeping patterns- unable to sleep or sleeping all the time   · Unwillingness or inability to communicate  · Depression  · Unusual sadness, discouragement and loneliness  · Talk of wanting to die  · Neglect of personal appearance   · Rebelliousness- reckless behavior  · Withdrawal from people/activities they love  · Confusion- inability to concentrate     If you or a loved one observes any of these behaviors or has concerns about self-harm, here's what you can do:  · Talk about it- your feelings and reasons for harming yourself  · Remove any means that you might use to hurt yourself (examples: pills, rope, extension cords, firearm)  · Get professional help from the community (Mental Health, Substance Abuse, psychological  counseling)  · Do not be alone:Call your Safe Contact- someone whom you trust who will be there for you.  · Call your local CRISIS HOTLINE 429-0219 or 909-842-1586  · Call your local Children's Mobile Crisis Response Team [place] (036) 615-2432 or www.Sentiment  · Call the toll free National Suicide Prevention Hotlines   · National Suicide Prevention Lifeline 014-038-XSOX (1498)  · National Hope Line Network 800-SUICIDE (594-3197)      Discharge Instructions per Dr. Toby Barraza, D.O.    DIET: Diet Order Regular    ACTIVITY: As tolerated    Post-angioseal instructions: no lifting greater than 5 lbs and no baths/ swimming/ soaking in tub for 5 days. Shower OK. OK to change dressings to band aid as needed.    A proper diet that is low in grease, fat, and salt, along with 30 minutes of exercise per day will lead to weight loss, and better controlled blood sugar and blood pressure.    DIAGNOSIS: Carotid stenosis, right    Follow up with your Primary Care Provider as scheduled or sooner if your symptoms persist or worsen.  Return to Emergency Room for sever chest pain, shortness of breath, signs of a stroke, or any other emergencies.

## 2020-09-17 NOTE — ASSESSMENT & PLAN NOTE
Recurrent carotid artery stenosis after prior thromboendarterectomy and Dacron patch angioplasty 2009  Right carotid artery stent placed in IR 9/16  Continue DAPT, ASA and Plavix loaded preoperatively  Monitor postoperatively in ICU, frequent neuro checks, blood pressure control

## 2020-09-17 NOTE — THERAPY
Physical Therapy   Initial Evaluation     Patient Name: Natividad Sewell  Age:  65 y.o., Sex:  female  Medical Record #: [id number]  Today's Date: 9/17/2020     Precautions: Fall Risk    Assessment  Patient is 65 y.o. female with a diagnosis of carotid stent placement.  Patient receptive to education on pacing and progressive ambulation program. Patient demonstrates household ambulation status and has strong support at home.  PT will not follow.        09/17/20 1215   Precautions   Precautions Fall Risk   Prior Living Situation   Prior Services None   Housing / Facility 1 Story Apartment / Condo   Steps Into Home 0   Steps In Home 0   Equipment Owned Single Point Cane   Lives with - Patient's Self Care Capacity Alone and Able to Care For Self   Comments lives with family, and family are her neighbors   Prior Level of Functional Mobility   Bed Mobility Independent   Transfer Status Independent   Ambulation Independent   Distance Ambulation (Feet) 150   Assistive Devices Used Single Point Cane   Stairs Independent   Cognition    Cognition / Consciousness WDL   Level of Consciousness Alert   Passive ROM Lower Body   Passive ROM Lower Body WDL   Active ROM Lower Body    Active ROM Lower Body  WDL   Strength Lower Body   Lower Body Strength  WDL   Sensation Lower Body   Lower Extremity Sensation   WDL   Balance Assessment   Sitting Balance (Static) Good   Sitting Balance (Dynamic) Good   Standing Balance (Static) Fair +   Standing Balance (Dynamic) Fair   Weight Shift Sitting Good   Weight Shift Standing Fair   Comments spc   Gait Analysis   Gait Level Of Assist Supervised   Assistive Device Single Point Cane   Distance (Feet) 200   # of Times Distance was Traveled 1   Weight Bearing Status fwb   Bed Mobility    Supine to Sit Supervised   Sit to Supine Supervised   Scooting Supervised   Functional Mobility   Sit to Stand Supervised   How much difficulty does the patient currently have...   Turning over in bed (including  adjusting bedclothes, sheets and blankets)? 4   Sitting down on and standing up from a chair with arms (e.g., wheelchair, bedside commode, etc.) 4   Moving from lying on back to sitting on the side of the bed? 4   How much help from another person does the patient currently need...   Moving to and from a bed to a chair (including a wheelchair)? 4   Need to walk in a hospital room? 4   Climbing 3-5 steps with a railing? 4   6 clicks Mobility Score 24   Education Group   Education Provided Role of Physical Therapist;Gait Training  (pacing)   Role of Physical Therapist Patient Response Patient;Acceptance;Explanation;Demonstration;Verbal Demonstration;Action Demonstration   Gait Training Patient Response Patient;Acceptance;Explanation;Demonstration;Verbal Demonstration;Action Demonstration   Anticipated Discharge Equipment and Recommendations   DC Equipment Recommendations None       Plan    Recommend Physical Therapy for Evaluation only     DC Equipment Recommendations: (P) None  Discharge Recommendations: (P) Anticipate that the patient will have no further physical therapy needs after discharge from the hospital

## 2020-10-06 NOTE — CONSULTS
Neuro Interventional Service Follow Up     Re: Natividad Sewell     MRN: [id number]   : 1955    Natividad Sewell was seen today in follow up after endovascular neurointervention performed by Bull Dennis MD at [facility] on 2020. She was referred to our service by Hank Mukherjee MD.    History of Present Illness:   presented with right carotid stenosis after an endarterectomy. She had a stroke in 2018 and the subsequent right carotid endarterectomy at that time. Follow up imaging revealed a 60% stenosis at the site of the endarterectomy and the patient was referred for evaluation and treatment of the stenosis with a carotid artery stent. Dr. Dennis performed uncomplicated stent placement on . The patient's clinical course was unremarkable. She was discharged from [facility] on  with prescriptions for Plavix and aspirin.    She is seen today for evaluation after the procedure. Today, the patient feels well. She had a fall yesterday where she tripped on the laundry cart and has some resulting bruising but no major bleeding. She is tolerating the aspirin and Plavix without problems. She denies symptoms of stroke, specifically left weakness or numbness. She states her angio site has healed well with no problems with bleeding or swelling. She has a follow up planned with Dr. Mukherjee. She has refills on the Plavix and aspirin for 6 months.    Past Medical History:   Diagnosis Date   • Arthritis    • Dental disorder     upper/lowers   • High cholesterol    • Hypertension     pt denies- not taking any medication currently   • Migraines, neuralgic    • Mitral valve prolapse    • Stroke ([facility]) 09    no residual weaknesses or problems     Past Surgical History:   Procedure Laterality Date   • CAROTID ENDARTERECTOMY  2009    Performed by ALCIDES MERINO at [facility]   • GYN SURGERY Bilateral     tubal ligation     Social History     Socioeconomic History    • Marital status: Single     Spouse name: Not on file   • Number of children: Not on file   • Years of education: Not on file   • Highest education level: Not on file   Occupational History   • Not on file   Social Needs   • Financial resource strain: Not on file   • Food insecurity     Worry: Patient refused     Inability: Patient refused   • Transportation needs     Medical: Patient refused     Non-medical: Patient refused   Tobacco Use   • Smoking status: Current Every Day Smoker     Packs/day: 1.00     Years: 40.00     Pack years: 40.00     Types: Cigarettes     Start date: 9/16/1980   • Smokeless tobacco: Never Used   Substance and Sexual Activity   • Alcohol use: No     Frequency: Never   • Drug use: Yes     Types: Inhaled     Comment: marijuana, last use 9/14/2020   • Sexual activity: Not on file   Lifestyle   • Physical activity     Days per week: Not on file     Minutes per session: Not on file   • Stress: Not on file   Relationships   • Social connections     Talks on phone: Not on file     Gets together: Not on file     Attends Baptism service: Not on file     Active member of club or organization: Not on file     Attends meetings of clubs or organizations: Not on file     Relationship status: Not on file   • Intimate partner violence     Fear of current or ex partner: Not on file     Emotionally abused: Not on file     Physically abused: Not on file     Forced sexual activity: Not on file   Other Topics Concern   • Not on file   Social History Narrative   • Not on file     No family history on file.    Review of Systems   Constitutional: Negative.  Negative for chills, diaphoresis, fever, malaise/fatigue and weight loss.   Respiratory: Negative.    Cardiovascular: Negative.    Gastrointestinal: Negative.  Negative for blood in stool and melena.   Skin: Negative.    Neurological: Negative for sensory change, speech change, focal weakness and weakness.   Endo/Heme/Allergies: Bruises/bleeds easily.        A comprehensive 14-point review of systems was negative except as described above.     Labs:      Ref. Range 9/17/2020 04:15   WBC Latest Ref Range: 4.8 - 10.8 K/uL 6.3   RBC Latest Ref Range: 4.20 - 5.40 M/uL 4.35   Hemoglobin Latest Ref Range: 12.0 - 16.0 g/dL 12.9   Hematocrit Latest Ref Range: 37.0 - 47.0 % 39.7   MCV Latest Ref Range: 81.4 - 97.8 fL 91.3   MCH Latest Ref Range: 27.0 - 33.0 pg 29.7   MCHC Latest Ref Range: 33.6 - 35.0 g/dL 32.5 (L)   RDW Latest Ref Range: 35.9 - 50.0 fL 46.8   Platelet Count Latest Ref Range: 164 - 446 K/uL 210   MPV Latest Ref Range: 9.0 - 12.9 fL 9.9   Neutrophils-Polys Latest Ref Range: 44.00 - 72.00 % 54.80   Neutrophils (Absolute) Latest Ref Range: 2.00 - 7.15 K/uL 3.46   Lymphocytes Latest Ref Range: 22.00 - 41.00 % 35.50   Lymphs (Absolute) Latest Ref Range: 1.00 - 4.80 K/uL 2.24   Monocytes Latest Ref Range: 0.00 - 13.40 % 8.10   Monos (Absolute) Latest Ref Range: 0.00 - 0.85 K/uL 0.51   Eosinophils Latest Ref Range: 0.00 - 6.90 % 1.10   Eos (Absolute) Latest Ref Range: 0.00 - 0.51 K/uL 0.07   Basophils Latest Ref Range: 0.00 - 1.80 % 0.30   Baso (Absolute) Latest Ref Range: 0.00 - 0.12 K/uL 0.02   Immature Granulocytes Latest Ref Range: 0.00 - 0.90 % 0.20   Immature Granulocytes (abs) Latest Ref Range: 0.00 - 0.11 K/uL 0.01   Nucleated RBC Latest Units: /100 WBC 0.00   NRBC (Absolute) Latest Units: K/uL 0.00   Sodium Latest Ref Range: 135 - 145 mmol/L 138   Potassium Latest Ref Range: 3.6 - 5.5 mmol/L 4.2   Chloride Latest Ref Range: 96 - 112 mmol/L 108   Co2 Latest Ref Range: 20 - 33 mmol/L 20   Anion Gap Latest Ref Range: 7.0 - 16.0  10.0   Glucose Latest Ref Range: 65 - 99 mg/dL 105 (H)   Bun Latest Ref Range: 8 - 22 mg/dL 13   Creatinine Latest Ref Range: 0.50 - 1.40 mg/dL 0.46 (L)   GFR If  Latest Ref Range: >60 mL/min/1.73 m 2 >60   GFR If Non  Latest Ref Range: >60 mL/min/1.73 m 2 >60   Calcium Latest Ref Range: 8.5 - 10.5  "mg/dL 8.1 (L)   Magnesium Latest Ref Range: 1.5 - 2.5 mg/dL 2.0   Cholesterol,Tot Latest Ref Range: 100 - 199 mg/dL 220 (H)   Triglycerides Latest Ref Range: 0 - 149 mg/dL 194 (H)   HDL Latest Ref Range: >=40 mg/dL 42   LDL Latest Ref Range: <100 mg/dL 139 (H)       Radiology:   Neurointerventional [facility]16/20 [facility]:  1.  Recurrent right carotid stenosis.  2.  Successful right carotid angioplasty and stent placement with embolic protection device.  3.  Ulcerative atherosclerotic plaque at the left internal carotid artery causing approximately 40% stenosis.      Current Outpatient Medications   Medication Sig Dispense Refill   • aspirin EC (ECOTRIN) 81 MG Tablet Delayed Response Take 81 mg by mouth every day. Indications: Excision of Thickened Areas Within the Carotid Arteries     • clopidogrel (PLAVIX) 75 MG Tab Take 75 mg by mouth every day. Indications: Carotid Artery Stenting       No current facility-administered medications for this encounter.        Allergies   Allergen Reactions   • Morphine      \"I don't know, it was during my recovery after the endarterectomy-they told me I had a bad reaction to it\"       Physical Exam   Constitutional: She is oriented to person, place, and time and well-developed, well-nourished, and in no distress. No distress.   Eyes: No scleral icterus.   Pulmonary/Chest: Effort normal. No respiratory distress.   Abdominal: She exhibits no distension.   Neurological: She is alert and oriented to person, place, and time. She has normal sensation and normal strength. She is not agitated and not disoriented. She displays no weakness, no tremor, facial symmetry, normal stance and normal speech. No cranial nerve deficit. Coordination and gait normal.   Ambulates with cane   Skin: Skin is warm and dry. No rash noted. She is not diaphoretic. No erythema. No pallor.   Psychiatric: Mood, memory, affect and judgment normal.       Impression:   1. Right carotid artery with recurrent " stenosis 60%, status post stent placement.   2. Asymptomatic left carotid artery stenosis 40%.   3. Hyperlipidemia.  4. Essential hypertension.    Plan:   Bull Dennis MD additionally evaluated the patient today. We discussed the method of the procedure at length and reviewed imaging studies. All questions were answered. We explained that the patient will need to have surveillance imaging after the procedure, which will be managed by us, and that future treatment depends on multiple factors including lab studies, imaging, and performance status. We have planned that the next imaging study will a carotid ultrasound performed in 1 year unless performed by her vascular surgeon in the interim. Plavix should be continued for at least 6 months and aspirin should be continued for at least 6 months and she has refills. She should continue to follow up with her other physicians.      ROMI Reilly with Bull Dennis MD  Neuro Interventional Service   [address])  [place] [address]  [phone]

## 2020-10-09 ENCOUNTER — HOSPITAL ENCOUNTER (OUTPATIENT)
Dept: RADIOLOGY | Facility: MEDICAL CENTER | Age: 65
End: 2020-10-09
Attending: NURSE PRACTITIONER
Payer: MEDICARE

## 2020-10-09 DIAGNOSIS — I65.21 CAROTID STENOSIS, RIGHT: ICD-10-CM

## 2020-10-09 ASSESSMENT — ENCOUNTER SYMPTOMS
DIAPHORESIS: 0
SENSORY CHANGE: 0
BRUISES/BLEEDS EASILY: 1
CHILLS: 0
SPEECH CHANGE: 0
FOCAL WEAKNESS: 0
GASTROINTESTINAL NEGATIVE: 1
BLOOD IN STOOL: 0
FEVER: 0
WEIGHT LOSS: 0
RESPIRATORY NEGATIVE: 1
WEAKNESS: 0
CARDIOVASCULAR NEGATIVE: 1
CONSTITUTIONAL NEGATIVE: 1

## 2021-03-03 DIAGNOSIS — Z23 NEED FOR VACCINATION: ICD-10-CM

## 2022-02-23 NOTE — OR SURGEON
Immediate Post- Operative Note        PostOp Diagnosis: Right carotid stenosis      Procedure(s): Right carotid stent palcement       Estimated Blood Loss: Less than 5 ml        Complications: None            9/16/2020     3:24 PM     Bull Dennis M.D.       no

## 2023-02-14 ENCOUNTER — APPOINTMENT (OUTPATIENT)
Dept: RADIOLOGY | Facility: MEDICAL CENTER | Age: 68
End: 2023-02-14
Attending: EMERGENCY MEDICINE
Payer: MEDICARE

## 2023-02-14 ENCOUNTER — HOSPITAL ENCOUNTER (OUTPATIENT)
Facility: MEDICAL CENTER | Age: 68
End: 2023-02-15
Attending: EMERGENCY MEDICINE | Admitting: STUDENT IN AN ORGANIZED HEALTH CARE EDUCATION/TRAINING PROGRAM
Payer: MEDICARE

## 2023-02-14 DIAGNOSIS — R29.90 STROKE-LIKE SYMPTOM: ICD-10-CM

## 2023-02-14 DIAGNOSIS — H53.131 ACUTE LOSS OF VISION, RIGHT: Primary | ICD-10-CM

## 2023-02-14 DIAGNOSIS — H54.61 VISION LOSS OF RIGHT EYE: ICD-10-CM

## 2023-02-14 PROBLEM — I65.23 BILATERAL CAROTID ARTERY STENOSIS: Status: ACTIVE | Noted: 2020-09-16

## 2023-02-14 PROBLEM — Z86.73 H/O: CVA (CEREBROVASCULAR ACCIDENT): Status: ACTIVE | Noted: 2023-02-14

## 2023-02-14 LAB
ABO GROUP BLD: NORMAL
ALBUMIN SERPL BCP-MCNC: 3.8 G/DL (ref 3.2–4.9)
ALBUMIN SERPL BCP-MCNC: 4.4 G/DL (ref 3.2–4.9)
ALBUMIN/GLOB SERPL: 1.4 G/DL
ALP SERPL-CCNC: 90 U/L (ref 30–99)
ALT SERPL-CCNC: 18 U/L (ref 2–50)
ANION GAP SERPL CALC-SCNC: 13 MMOL/L (ref 7–16)
APTT PPP: 29 SEC (ref 24.7–36)
AST SERPL-CCNC: 19 U/L (ref 12–45)
BASOPHILS # BLD AUTO: 0.4 % (ref 0–1.8)
BASOPHILS # BLD AUTO: 0.5 % (ref 0–1.8)
BASOPHILS # BLD: 0.03 K/UL (ref 0–0.12)
BASOPHILS # BLD: 0.03 K/UL (ref 0–0.12)
BILIRUB SERPL-MCNC: 0.2 MG/DL (ref 0.1–1.5)
BLD GP AB SCN SERPL QL: NORMAL
BUN SERPL-MCNC: 18 MG/DL (ref 8–22)
BUN SERPL-MCNC: 20 MG/DL (ref 8–22)
CALCIUM ALBUM COR SERPL-MCNC: 9.3 MG/DL (ref 8.5–10.5)
CALCIUM ALBUM COR SERPL-MCNC: 9.3 MG/DL (ref 8.5–10.5)
CALCIUM SERPL-MCNC: 9.1 MG/DL (ref 8.5–10.5)
CALCIUM SERPL-MCNC: 9.6 MG/DL (ref 8.5–10.5)
CHLORIDE SERPL-SCNC: 103 MMOL/L (ref 96–112)
CHLORIDE SERPL-SCNC: 106 MMOL/L (ref 96–112)
CHOLEST SERPL-MCNC: 225 MG/DL (ref 100–199)
CO2 SERPL-SCNC: 22 MMOL/L (ref 20–33)
CO2 SERPL-SCNC: 23 MMOL/L (ref 20–33)
CREAT SERPL-MCNC: 0.54 MG/DL (ref 0.5–1.4)
CREAT SERPL-MCNC: 0.58 MG/DL (ref 0.5–1.4)
EKG IMPRESSION: NORMAL
EOSINOPHIL # BLD AUTO: 0.08 K/UL (ref 0–0.51)
EOSINOPHIL # BLD AUTO: 0.08 K/UL (ref 0–0.51)
EOSINOPHIL NFR BLD: 1.1 % (ref 0–6.9)
EOSINOPHIL NFR BLD: 1.2 % (ref 0–6.9)
ERYTHROCYTE [DISTWIDTH] IN BLOOD BY AUTOMATED COUNT: 43.1 FL (ref 35.9–50)
ERYTHROCYTE [DISTWIDTH] IN BLOOD BY AUTOMATED COUNT: 43.6 FL (ref 35.9–50)
GFR SERPLBLD CREATININE-BSD FMLA CKD-EPI: 101 ML/MIN/1.73 M 2
GFR SERPLBLD CREATININE-BSD FMLA CKD-EPI: 99 ML/MIN/1.73 M 2
GLOBULIN SER CALC-MCNC: 3.1 G/DL (ref 1.9–3.5)
GLUCOSE BLD STRIP.AUTO-MCNC: 120 MG/DL (ref 65–99)
GLUCOSE SERPL-MCNC: 123 MG/DL (ref 65–99)
GLUCOSE SERPL-MCNC: 98 MG/DL (ref 65–99)
HCT VFR BLD AUTO: 43.6 % (ref 37–47)
HCT VFR BLD AUTO: 46.4 % (ref 37–47)
HDLC SERPL-MCNC: 53 MG/DL
HGB BLD-MCNC: 14.5 G/DL (ref 12–16)
HGB BLD-MCNC: 15.4 G/DL (ref 12–16)
IMM GRANULOCYTES # BLD AUTO: 0.02 K/UL (ref 0–0.11)
IMM GRANULOCYTES # BLD AUTO: 0.03 K/UL (ref 0–0.11)
IMM GRANULOCYTES NFR BLD AUTO: 0.3 % (ref 0–0.9)
IMM GRANULOCYTES NFR BLD AUTO: 0.4 % (ref 0–0.9)
INR PPP: 1.01 (ref 0.87–1.13)
LDLC SERPL CALC-MCNC: 145 MG/DL
LYMPHOCYTES # BLD AUTO: 2.38 K/UL (ref 1–4.8)
LYMPHOCYTES # BLD AUTO: 2.41 K/UL (ref 1–4.8)
LYMPHOCYTES NFR BLD: 33.6 % (ref 22–41)
LYMPHOCYTES NFR BLD: 37.1 % (ref 22–41)
MAGNESIUM SERPL-MCNC: 2 MG/DL (ref 1.5–2.5)
MCH RBC QN AUTO: 28.9 PG (ref 27–33)
MCH RBC QN AUTO: 28.9 PG (ref 27–33)
MCHC RBC AUTO-ENTMCNC: 33.2 G/DL (ref 33.6–35)
MCHC RBC AUTO-ENTMCNC: 33.3 G/DL (ref 33.6–35)
MCV RBC AUTO: 87 FL (ref 81.4–97.8)
MCV RBC AUTO: 87.2 FL (ref 81.4–97.8)
MONOCYTES # BLD AUTO: 0.47 K/UL (ref 0–0.85)
MONOCYTES # BLD AUTO: 0.49 K/UL (ref 0–0.85)
MONOCYTES NFR BLD AUTO: 6.6 % (ref 0–13.4)
MONOCYTES NFR BLD AUTO: 7.6 % (ref 0–13.4)
NEUTROPHILS # BLD AUTO: 3.46 K/UL (ref 2–7.15)
NEUTROPHILS # BLD AUTO: 4.09 K/UL (ref 2–7.15)
NEUTROPHILS NFR BLD: 53.3 % (ref 44–72)
NEUTROPHILS NFR BLD: 57.9 % (ref 44–72)
NRBC # BLD AUTO: 0 K/UL
NRBC # BLD AUTO: 0 K/UL
NRBC BLD-RTO: 0 /100 WBC
NRBC BLD-RTO: 0 /100 WBC
PHOSPHATE SERPL-MCNC: 4.2 MG/DL (ref 2.5–4.5)
PLATELET # BLD AUTO: 238 K/UL (ref 164–446)
PLATELET # BLD AUTO: 259 K/UL (ref 164–446)
PMV BLD AUTO: 9.2 FL (ref 9–12.9)
PMV BLD AUTO: 9.5 FL (ref 9–12.9)
POTASSIUM SERPL-SCNC: 4 MMOL/L (ref 3.6–5.5)
POTASSIUM SERPL-SCNC: 4.1 MMOL/L (ref 3.6–5.5)
PROT SERPL-MCNC: 7.5 G/DL (ref 6–8.2)
PROTHROMBIN TIME: 13.2 SEC (ref 12–14.6)
RBC # BLD AUTO: 5.01 M/UL (ref 4.2–5.4)
RBC # BLD AUTO: 5.32 M/UL (ref 4.2–5.4)
RH BLD: NORMAL
SODIUM SERPL-SCNC: 137 MMOL/L (ref 135–145)
SODIUM SERPL-SCNC: 142 MMOL/L (ref 135–145)
TRIGL SERPL-MCNC: 133 MG/DL (ref 0–149)
TROPONIN T SERPL-MCNC: 16 NG/L (ref 6–19)
WBC # BLD AUTO: 6.5 K/UL (ref 4.8–10.8)
WBC # BLD AUTO: 7.1 K/UL (ref 4.8–10.8)

## 2023-02-14 PROCEDURE — 82962 GLUCOSE BLOOD TEST: CPT

## 2023-02-14 PROCEDURE — 86900 BLOOD TYPING SEROLOGIC ABO: CPT

## 2023-02-14 PROCEDURE — 700102 HCHG RX REV CODE 250 W/ 637 OVERRIDE(OP): Performed by: STUDENT IN AN ORGANIZED HEALTH CARE EDUCATION/TRAINING PROGRAM

## 2023-02-14 PROCEDURE — 80069 RENAL FUNCTION PANEL: CPT

## 2023-02-14 PROCEDURE — 80061 LIPID PANEL: CPT

## 2023-02-14 PROCEDURE — 99223 1ST HOSP IP/OBS HIGH 75: CPT | Performed by: STUDENT IN AN ORGANIZED HEALTH CARE EDUCATION/TRAINING PROGRAM

## 2023-02-14 PROCEDURE — G0378 HOSPITAL OBSERVATION PER HR: HCPCS

## 2023-02-14 PROCEDURE — 36415 COLL VENOUS BLD VENIPUNCTURE: CPT

## 2023-02-14 PROCEDURE — 700117 HCHG RX CONTRAST REV CODE 255: Performed by: EMERGENCY MEDICINE

## 2023-02-14 PROCEDURE — 86850 RBC ANTIBODY SCREEN: CPT

## 2023-02-14 PROCEDURE — 70450 CT HEAD/BRAIN W/O DYE: CPT

## 2023-02-14 PROCEDURE — 93005 ELECTROCARDIOGRAM TRACING: CPT | Performed by: EMERGENCY MEDICINE

## 2023-02-14 PROCEDURE — 70496 CT ANGIOGRAPHY HEAD: CPT

## 2023-02-14 PROCEDURE — 84484 ASSAY OF TROPONIN QUANT: CPT

## 2023-02-14 PROCEDURE — 80053 COMPREHEN METABOLIC PANEL: CPT

## 2023-02-14 PROCEDURE — 70498 CT ANGIOGRAPHY NECK: CPT

## 2023-02-14 PROCEDURE — A9270 NON-COVERED ITEM OR SERVICE: HCPCS | Performed by: STUDENT IN AN ORGANIZED HEALTH CARE EDUCATION/TRAINING PROGRAM

## 2023-02-14 PROCEDURE — 99204 OFFICE O/P NEW MOD 45 MIN: CPT | Performed by: PSYCHIATRY & NEUROLOGY

## 2023-02-14 PROCEDURE — 83735 ASSAY OF MAGNESIUM: CPT

## 2023-02-14 PROCEDURE — 85730 THROMBOPLASTIN TIME PARTIAL: CPT

## 2023-02-14 PROCEDURE — 99285 EMERGENCY DEPT VISIT HI MDM: CPT

## 2023-02-14 PROCEDURE — 83036 HEMOGLOBIN GLYCOSYLATED A1C: CPT

## 2023-02-14 PROCEDURE — 85610 PROTHROMBIN TIME: CPT

## 2023-02-14 PROCEDURE — 71045 X-RAY EXAM CHEST 1 VIEW: CPT

## 2023-02-14 PROCEDURE — 85025 COMPLETE CBC W/AUTO DIFF WBC: CPT

## 2023-02-14 PROCEDURE — 0042T CT-CEREBRAL PERFUSION ANALYSIS: CPT

## 2023-02-14 PROCEDURE — 700111 HCHG RX REV CODE 636 W/ 250 OVERRIDE (IP): Performed by: STUDENT IN AN ORGANIZED HEALTH CARE EDUCATION/TRAINING PROGRAM

## 2023-02-14 PROCEDURE — 86901 BLOOD TYPING SEROLOGIC RH(D): CPT

## 2023-02-14 PROCEDURE — 96374 THER/PROPH/DIAG INJ IV PUSH: CPT | Mod: XU

## 2023-02-14 RX ORDER — ONDANSETRON 2 MG/ML
4 INJECTION INTRAMUSCULAR; INTRAVENOUS EVERY 4 HOURS PRN
Status: DISCONTINUED | OUTPATIENT
Start: 2023-02-14 | End: 2023-02-15 | Stop reason: HOSPADM

## 2023-02-14 RX ORDER — LISINOPRIL 10 MG/1
5 TABLET ORAL
Status: DISCONTINUED | OUTPATIENT
Start: 2023-02-14 | End: 2023-02-15 | Stop reason: HOSPADM

## 2023-02-14 RX ORDER — ACETAMINOPHEN 325 MG/1
650 TABLET ORAL EVERY 6 HOURS PRN
Status: DISCONTINUED | OUTPATIENT
Start: 2023-02-14 | End: 2023-02-15 | Stop reason: HOSPADM

## 2023-02-14 RX ORDER — ATORVASTATIN CALCIUM 80 MG/1
80 TABLET, FILM COATED ORAL EVERY EVENING
Status: DISCONTINUED | OUTPATIENT
Start: 2023-02-14 | End: 2023-02-15 | Stop reason: HOSPADM

## 2023-02-14 RX ORDER — LABETALOL HYDROCHLORIDE 5 MG/ML
10 INJECTION, SOLUTION INTRAVENOUS EVERY 4 HOURS PRN
Status: DISCONTINUED | OUTPATIENT
Start: 2023-02-14 | End: 2023-02-15 | Stop reason: HOSPADM

## 2023-02-14 RX ORDER — ONDANSETRON 4 MG/1
4 TABLET, ORALLY DISINTEGRATING ORAL EVERY 4 HOURS PRN
Status: DISCONTINUED | OUTPATIENT
Start: 2023-02-14 | End: 2023-02-15 | Stop reason: HOSPADM

## 2023-02-14 RX ORDER — LISINOPRIL 10 MG/1
5 TABLET ORAL
Status: DISCONTINUED | OUTPATIENT
Start: 2023-02-14 | End: 2023-02-14

## 2023-02-14 RX ORDER — AMOXICILLIN 250 MG
2 CAPSULE ORAL 2 TIMES DAILY
Status: DISCONTINUED | OUTPATIENT
Start: 2023-02-14 | End: 2023-02-15 | Stop reason: HOSPADM

## 2023-02-14 RX ORDER — LORAZEPAM 1 MG/1
1 TABLET ORAL
Status: DISCONTINUED | OUTPATIENT
Start: 2023-02-14 | End: 2023-02-15 | Stop reason: HOSPADM

## 2023-02-14 RX ORDER — POLYETHYLENE GLYCOL 3350 17 G/17G
1 POWDER, FOR SOLUTION ORAL
Status: DISCONTINUED | OUTPATIENT
Start: 2023-02-14 | End: 2023-02-15 | Stop reason: HOSPADM

## 2023-02-14 RX ORDER — BISACODYL 10 MG
10 SUPPOSITORY, RECTAL RECTAL
Status: DISCONTINUED | OUTPATIENT
Start: 2023-02-14 | End: 2023-02-15 | Stop reason: HOSPADM

## 2023-02-14 RX ORDER — GUAIFENESIN/DEXTROMETHORPHAN 100-10MG/5
10 SYRUP ORAL EVERY 6 HOURS PRN
Status: DISCONTINUED | OUTPATIENT
Start: 2023-02-14 | End: 2023-02-15 | Stop reason: HOSPADM

## 2023-02-14 RX ADMIN — ATORVASTATIN CALCIUM 80 MG: 80 TABLET, FILM COATED ORAL at 18:41

## 2023-02-14 RX ADMIN — IOHEXOL 80 ML: 350 INJECTION, SOLUTION INTRAVENOUS at 01:15

## 2023-02-14 RX ADMIN — ONDANSETRON 4 MG: 2 INJECTION INTRAMUSCULAR; INTRAVENOUS at 20:53

## 2023-02-14 RX ADMIN — SENNOSIDES AND DOCUSATE SODIUM 2 TABLET: 50; 8.6 TABLET ORAL at 18:41

## 2023-02-14 RX ADMIN — ASPIRIN 81 MG: 81 TABLET, COATED ORAL at 06:21

## 2023-02-14 RX ADMIN — LISINOPRIL 5 MG: 10 TABLET ORAL at 11:08

## 2023-02-14 RX ADMIN — IOHEXOL 40 ML: 350 INJECTION, SOLUTION INTRAVENOUS at 01:00

## 2023-02-14 ASSESSMENT — ENCOUNTER SYMPTOMS
EYE PAIN: 0
MYALGIAS: 0
SENSORY CHANGE: 0
SPEECH CHANGE: 0
BLURRED VISION: 1
SHORTNESS OF BREATH: 0
HEADACHES: 0
PHOTOPHOBIA: 0
TREMORS: 0
DIZZINESS: 0
HEARTBURN: 0
NERVOUS/ANXIOUS: 0
FEVER: 0
TINGLING: 0
DOUBLE VISION: 0
BRUISES/BLEEDS EASILY: 0
EYE REDNESS: 0
COUGH: 0
PALPITATIONS: 0
WEAKNESS: 0
DEPRESSION: 0
EYE DISCHARGE: 0
CHILLS: 0
NAUSEA: 0

## 2023-02-14 ASSESSMENT — LIFESTYLE VARIABLES
ON A TYPICAL DAY WHEN YOU DRINK ALCOHOL HOW MANY DRINKS DO YOU HAVE: 0
TOTAL SCORE: 0
EVER FELT BAD OR GUILTY ABOUT YOUR DRINKING: NO
ALCOHOL_USE: NO
CONSUMPTION TOTAL: NEGATIVE
SUBSTANCE_ABUSE: 0
TOTAL SCORE: 0
AVERAGE NUMBER OF DAYS PER WEEK YOU HAVE A DRINK CONTAINING ALCOHOL: 0
HAVE YOU EVER FELT YOU SHOULD CUT DOWN ON YOUR DRINKING: NO
HAVE PEOPLE ANNOYED YOU BY CRITICIZING YOUR DRINKING: NO
EVER HAD A DRINK FIRST THING IN THE MORNING TO STEADY YOUR NERVES TO GET RID OF A HANGOVER: NO
HOW MANY TIMES IN THE PAST YEAR HAVE YOU HAD 5 OR MORE DRINKS IN A DAY: 0
DOES PATIENT WANT TO STOP DRINKING: NO
TOTAL SCORE: 0

## 2023-02-14 ASSESSMENT — PATIENT HEALTH QUESTIONNAIRE - PHQ9
SUM OF ALL RESPONSES TO PHQ9 QUESTIONS 1 AND 2: 0
1. LITTLE INTEREST OR PLEASURE IN DOING THINGS: NOT AT ALL
2. FEELING DOWN, DEPRESSED, IRRITABLE, OR HOPELESS: NOT AT ALL

## 2023-02-14 ASSESSMENT — PAIN DESCRIPTION - PAIN TYPE: TYPE: ACUTE PAIN

## 2023-02-14 ASSESSMENT — FIBROSIS 4 INDEX: FIB4 SCORE: 1.26

## 2023-02-14 NOTE — ED PROVIDER NOTES
"  ER Provider Note    Scribed for Herbie Dodd II, M.D. by Nigel Champion. 2/14/2023  12:18 AM    Primary Care Provider: Pcp Pt States None    CHIEF COMPLAINT  Chief Complaint   Patient presents with    Loss of Vision     Right eye, pt feels like she has a grey film over her right eye, happened 45 minutes ago. Pt had a dizzy feeling for the last couple of days. Hx of stroke in 2009, had a stent placed in her right carotid two years ago, and the left side was 40% occluded at that time.      EXTERNAL RECORDS REVIEWED  Inpatient Notes 6/4/2009 Right MCA stroke then subsequent right carotid endarterectomy.     HPI/ROS    LIMITATION TO HISTORY   Select: : None    Natividad Sewell is a 68 y.o. female who presents to the ED for evaluation of possible stroke onset 1 hour ago. She states that she began having right sided vision problems while watching TV tonight. Over the past two days she has been having short patches of dizziness but has not thought much of them. She describes her vision problems as \"a grey film covering my eye\", and denies any flashers or floaters. Additionally, she endorses that her vision problems are only in the top half of her vision. She denies any current problems walking but is in a wheelchair currently as she is scared of falling. She denies any headache. She has a history of stroke in 2009 and a right carotid stent placement shortly after. She has residual left sided facial droop from her stroke. She is taking aspirin.     PAST MEDICAL HISTORY  Past Medical History:   Diagnosis Date    Arthritis     Dental disorder     upper/lowers    High cholesterol     Hypertension     pt denies- not taking any medication currently    Migraines, neuralgic     Mitral valve prolapse     Stroke (HCC) 6/04/09    no residual weaknesses or problems     SURGICAL HISTORY  Past Surgical History:   Procedure Laterality Date    CAROTID ENDARTERECTOMY  6/6/2009    Performed by ALCIDES MERINO at SURGERY [facility]" "ORS    GYN SURGERY Bilateral     tubal ligation     FAMILY HISTORY  None noted    SOCIAL HISTORY   reports that she has been smoking cigarettes. She started smoking about 42 years ago. She has a 40.00 pack-year smoking history. She has never used smokeless tobacco. She reports current drug use. Drug: Inhaled. She reports that she does not drink alcohol.    CURRENT MEDICATIONS  Previous Medications    No medications on file     ALLERGIES  Morphine    PHYSICAL EXAM  BP (!) 183/95   Pulse 100   Temp 37.1 °C (98.8 °F) (Temporal)   Resp 18   Ht 1.626 m (5' 4\")   Wt 79.4 kg (175 lb)   LMP  (LMP Unknown)   SpO2 96%   BMI 30.04 kg/m²   Physical Exam  Vitals and nursing note reviewed.   Constitutional:       Appearance: Normal appearance.      Comments: Sitting in wheelchair, appears anxious   HENT:      Head: Normocephalic and atraumatic.      Mouth/Throat:      Mouth: Mucous membranes are moist.   Eyes:      Extraocular Movements: Extraocular movements intact.      Pupils: Pupils are equal, round, and reactive to light.      Comments: Loss of peripheral vision at right eye, upper fields. Cornea is clear.   Cardiovascular:      Rate and Rhythm: Normal rate and regular rhythm.   Pulmonary:      Effort: Pulmonary effort is normal. No respiratory distress.      Breath sounds: Normal breath sounds.   Abdominal:      Tenderness: There is no abdominal tenderness.   Musculoskeletal:         General: No swelling. Normal range of motion.      Cervical back: Normal range of motion.   Skin:     General: Skin is warm.   Neurological:      Mental Status: She is alert.      Comments: Alert and oriented x3. Clear speech. No aphasia. Peripheral vision loss at left eye upper fields. No peripheral ataxia. No extremity drift. Chronic left sided facial droop.   Psychiatric:         Mood and Affect: Mood normal.      Comments: Slightly anxious but otherwise appropriate     DIAGNOSTIC STUDIES    Labs:   Results for orders placed or " performed during the hospital encounter of 02/14/23   CBC WITH DIFFERENTIAL   Result Value Ref Range    WBC 7.1 4.8 - 10.8 K/uL    RBC 5.32 4.20 - 5.40 M/uL    Hemoglobin 15.4 12.0 - 16.0 g/dL    Hematocrit 46.4 37.0 - 47.0 %    MCV 87.2 81.4 - 97.8 fL    MCH 28.9 27.0 - 33.0 pg    MCHC 33.2 (L) 33.6 - 35.0 g/dL    RDW 43.1 35.9 - 50.0 fL    Platelet Count 259 164 - 446 K/uL    MPV 9.2 9.0 - 12.9 fL    Neutrophils-Polys 57.90 44.00 - 72.00 %    Lymphocytes 33.60 22.00 - 41.00 %    Monocytes 6.60 0.00 - 13.40 %    Eosinophils 1.10 0.00 - 6.90 %    Basophils 0.40 0.00 - 1.80 %    Immature Granulocytes 0.40 0.00 - 0.90 %    Nucleated RBC 0.00 /100 WBC    Neutrophils (Absolute) 4.09 2.00 - 7.15 K/uL    Lymphs (Absolute) 2.38 1.00 - 4.80 K/uL    Monos (Absolute) 0.47 0.00 - 0.85 K/uL    Eos (Absolute) 0.08 0.00 - 0.51 K/uL    Baso (Absolute) 0.03 0.00 - 0.12 K/uL    Immature Granulocytes (abs) 0.03 0.00 - 0.11 K/uL    NRBC (Absolute) 0.00 K/uL   COMP METABOLIC PANEL   Result Value Ref Range    Sodium 142 135 - 145 mmol/L    Potassium 4.0 3.6 - 5.5 mmol/L    Chloride 106 96 - 112 mmol/L    Co2 23 20 - 33 mmol/L    Anion Gap 13.0 7.0 - 16.0    Glucose 123 (H) 65 - 99 mg/dL    Bun 20 8 - 22 mg/dL    Creatinine 0.58 0.50 - 1.40 mg/dL    Calcium 9.6 8.5 - 10.5 mg/dL    AST(SGOT) 19 12 - 45 U/L    ALT(SGPT) 18 2 - 50 U/L    Alkaline Phosphatase 90 30 - 99 U/L    Total Bilirubin 0.2 0.1 - 1.5 mg/dL    Albumin 4.4 3.2 - 4.9 g/dL    Total Protein 7.5 6.0 - 8.2 g/dL    Globulin 3.1 1.9 - 3.5 g/dL    A-G Ratio 1.4 g/dL   PROTHROMBIN TIME   Result Value Ref Range    PT 13.2 12.0 - 14.6 sec    INR 1.01 0.87 - 1.13   APTT   Result Value Ref Range    APTT 29.0 24.7 - 36.0 sec   COD (ADULT)   Result Value Ref Range    ABO Grouping Only A     Rh Grouping Only POS     Antibody Screen-Cod NEG    TROPONIN   Result Value Ref Range    Troponin T 16 6 - 19 ng/L   CORRECTED CALCIUM   Result Value Ref Range    Correct Calcium 9.3 8.5 - 10.5  mg/dL   ESTIMATED GFR   Result Value Ref Range    GFR (CKD-EPI) 99 >60 mL/min/1.73 m 2   CBC WITH DIFFERENTIAL   Result Value Ref Range    WBC 6.5 4.8 - 10.8 K/uL    RBC 5.01 4.20 - 5.40 M/uL    Hemoglobin 14.5 12.0 - 16.0 g/dL    Hematocrit 43.6 37.0 - 47.0 %    MCV 87.0 81.4 - 97.8 fL    MCH 28.9 27.0 - 33.0 pg    MCHC 33.3 (L) 33.6 - 35.0 g/dL    RDW 43.6 35.9 - 50.0 fL    Platelet Count 238 164 - 446 K/uL    MPV 9.5 9.0 - 12.9 fL    Neutrophils-Polys 53.30 44.00 - 72.00 %    Lymphocytes 37.10 22.00 - 41.00 %    Monocytes 7.60 0.00 - 13.40 %    Eosinophils 1.20 0.00 - 6.90 %    Basophils 0.50 0.00 - 1.80 %    Immature Granulocytes 0.30 0.00 - 0.90 %    Nucleated RBC 0.00 /100 WBC    Neutrophils (Absolute) 3.46 2.00 - 7.15 K/uL    Lymphs (Absolute) 2.41 1.00 - 4.80 K/uL    Monos (Absolute) 0.49 0.00 - 0.85 K/uL    Eos (Absolute) 0.08 0.00 - 0.51 K/uL    Baso (Absolute) 0.03 0.00 - 0.12 K/uL    Immature Granulocytes (abs) 0.02 0.00 - 0.11 K/uL    NRBC (Absolute) 0.00 K/uL   Renal Function Panel   Result Value Ref Range    Sodium 137 135 - 145 mmol/L    Potassium 4.1 3.6 - 5.5 mmol/L    Chloride 103 96 - 112 mmol/L    Co2 22 20 - 33 mmol/L    Glucose 98 65 - 99 mg/dL    Creatinine 0.54 0.50 - 1.40 mg/dL    Bun 18 8 - 22 mg/dL    Calcium 9.1 8.5 - 10.5 mg/dL    Phosphorus 4.2 2.5 - 4.5 mg/dL    Albumin 3.8 3.2 - 4.9 g/dL   MAGNESIUM   Result Value Ref Range    Magnesium 2.0 1.5 - 2.5 mg/dL   Lipid Profile   Result Value Ref Range    Cholesterol,Tot 225 (H) 100 - 199 mg/dL    Triglycerides 133 0 - 149 mg/dL    HDL 53 >=40 mg/dL     (H) <100 mg/dL   CORRECTED CALCIUM   Result Value Ref Range    Correct Calcium 9.3 8.5 - 10.5 mg/dL   ESTIMATED GFR   Result Value Ref Range    GFR (CKD-EPI) 101 >60 mL/min/1.73 m 2   EKG (NOW)   Result Value Ref Range    Report       [facility] Emergency Dept.    Test Date:  2023-02-14  Pt Name:    URBAN AGUIAR                  Department: ER  MRN:        [id number]                       Room:  Gender:     Female                       Technician: [id number]  :        [id number]                   Requested By:HERBIE KNOX II  Order #:    [id number]                    Reading MD: Herbie Knox II, MD    Measurements  Intervals                                Axis  Rate:       81                           P:          55  OH:         153                          QRS:        59  QRSD:       75                           T:          67  QT:         389  QTc:        452    Interpretive Statements  Sinus rhythm  Rate 81  Normal interval  No ST elevation or depression.  Normal T waves.  Impression: Normal sinus rhythm EKG  Compared to ECG 07/10/2020 12:23:20  Electronically Signed On 2023 2:17:08 PST by Herbie Knox II, MD     POCT glucose device results   Result Value Ref Range    POC Glucose, Blood 120 (H) 65 - 99 mg/dL        EKG:   I have independently interpreted this EKG     Radiology:   The attending emergency physician has independently interpreted the diagnostic imaging associated with this visit and am waiting the final reading from the radiologist.   Preliminary interpretation is a follows: I reviewed CT of the head and there is no signs of intracranial hemorrhage.  Chronic changes to the right side of the brain.  Radiologist interpretation:   DX-CHEST-PORTABLE (1 VIEW)   Final Result         1.  Bibasilar atelectasis or early infiltrates.   2.  Atherosclerosis      CT-CTA HEAD WITH & W/O-POST PROCESS   Final Result         1.  Distal right M3 branch of nonopacification leading to area of encephalomalacia, likely chronic occlusion related to prior infarction and stable since prior study.   2.  Otherwise no large vessel occlusion or aneurysm identified      CT-CTA NECK WITH & W/O-POST PROCESSING   Final Result         1.  Dense calcification of the origin the left common carotid artery resulting in greater than 70% stenosis.         CT-CEREBRAL PERFUSION ANALYSIS    Final Result         1.  Cerebral blood flow less than 30% likely representing completed infarct = 0 mL.      2.  T Max more than 6 seconds likely representing combination of completed infarct and ischemia = 0 mL.      3.  Mismatched volume likely representing ischemic brain/penumbra = None      4.  Please note that the cerebral perfusion was performed on the limited brain tissue around the basal ganglia region. Infarct/ischemia outside the CT perfusion sections can be missed in this study.      CT-HEAD W/O   Final Result         1.  No acute intracranial abnormality.   2.  Atherosclerosis.         MR-BRAIN-W/O    (Results Pending)        COURSE & MEDICAL DECISION MAKING     ED Observation Status? No; Patient does not meet criteria for ED Observation.     INITIAL ASSESSMENT, COURSE AND PLAN  Care Narrative: This is an emergent valuation of a 68-year-old woman who has a history of atherosclerosis, prior stroke, prior right carotid endarterectomy now presenting with concerns of vision loss in her right eye.  This happened 45 minutes prior to arrival.  No eye pain.  She also endorsed having some dizziness with the symptoms.  On exam she lacks right eye superior peripheral vision.  Not any specific quadrant.  Equally reactive pupils.  Normal eye movements.  Clear corneas.  No other neurologic deficits.  She says she had been using a cane today because she felt unsteady but later clarified that this is not unusual.  A stroke activation was called after evaluation at charge desk.  Differential diagnosis includes CVA , branch retinal artery occlusion, (unlikely to be central retinal artery occlusion since she still has vision in her right eye), retinal detachment, vitreal detachment/hemorrhage, ocular migraine.  NIH stroke scale is 1.    12:29 AM - Patient seen and examined at charge desk. Stroke alert activated.    12:47 AM - Patient reevaluated at bedside. EP bedside ultrasound performed. No visible retinal detachment,  no visual vitreous detachment or hemorrhage and lens appears to be in the appropriate position. Paged Neurology.    12:58 AM - I discussed the patient's case and the above findings with Dr. Conti (Neurology) who states that she is not a candidate for thrombolytics at this time.  He has already evaluated her and placed his recommendations in the medical record.  I agree with this plan.  Unlikely CVA given distribution of vision loss and no other check to neurologic findings.    1:56 AM - Paged Hospitalist. Informed patient of plan for admission so she can receive an MRI, to which she is amenable.  Her vision is now returning.  She has no headache.  Very suspicious for branch retinal artery occlusion.    2:08 AM - I discussed the patient's case and the above findings with Dr. Perez (Hospitalist) who will assess the patient for hospitalization.        CRITICAL CARE  The very real possibilty of a deterioration of this patient's condition required the highest level of my preparedness for sudden, emergent intervention.  I provided critical care services, which included medication orders, frequent reevaluations of the patient's condition and response to treatment, ordering and reviewing test results, and discussing the case with various consultants.  The critical care time associated with the care of the patient was 30 minutes. Review chart for interventions. This time is exclusive of any other billable procedures.        PROBLEM LIST  #Partial right eye vision loss   -Branch retinal artery occlusion versus migraine.  Unlikely CVA.  Bedside ultrasound did not reveal any signs of retinal detachment or vitreous abnormality.  Lens also in a normal position.  Since vision is improving ophthalmology is not being consulted emergently.  Admission with medicine for further work-up, MRI.        DISPOSITION AND DISCUSSIONS  I have discussed management of the patient with the following physicians and CLAUDE's:  Dr. Conti  (Neurology), Dr. Perez (Hospitalist)    Barriers to care at this time, including but not limited to: Patient does not have established PCP.         FINAL DIAGNOSIS  1. Acute loss of vision, right Active   2. Stroke-like symptom        Nigel MACHADO (Scribe), am scribing for, and in the presence of, Herbie Dodd II, MD.    Electronically signed by: Nigel Champion (Scribe), 2/14/2023    IHerbie II, MD personally performed the services described in this documentation, as scribed by Nigel Champion in my presence, and it is both accurate and complete.      The note accurately reflects work and decisions made by me.  Herbie Dodd II, M.D.  2/14/2023  7:13 AM

## 2023-02-14 NOTE — ASSESSMENT & PLAN NOTE
Right upper quadrant vision loss of right eye  CTA head: Distal right M3 nonopacification, likely chronic occlusion  CTA neck: >70% left ICA stenosis  No acute etiology seen on CT head, no perfusion defect on perfusion scan    H/o right sided CVA and prior right CEA and subsequent carotid stent placement    ASA/statin  Follow-up MRI brain    Neurology signed off, recommendations appreciated --- medical management, follow MRI brain, Consider ophthalmology consult if MRI brain negative

## 2023-02-14 NOTE — DISCHARGE PLANNING
[facility] Transitional Care Coordination    Referral from:  Chris  Insurance Provider on Facesheet:SHELL  Potential Rehab Diagnosis: Possible stroke    Chart review indicates patient may have on going medical management and may have therapy needs to possibly meet inpatient rehab facility criteria with the goal of returning to community.    D/C support: friend, will need to verify d/c support     Physiatry consultation pending per protocol.   Will need therapy notes, waiting on MRI results.  TCC to follow         Thank you for the referral.

## 2023-02-14 NOTE — THERAPY
Speech Language Therapy Contact Note    Patient Name: Natividad Sewell  Age:  68 y.o., Sex:  female  Medical Record #: [id number]  Today's Date: 2/14/2023    Discussed missed therapy with ROMI Andersen.        02/14/23 0670   Initial Contact Note    Initial Contact Note  Order Received and Verified. Speech Therapy Evaluation NOT Completed Because Patient Does Not Require Acute Speech Therapy at this Time.   Interdisciplinary Plan of Care Collaboration   IDT Collaboration with  Nurse Practitioner   Collaboration Comments ROMI cleared to cancel cognitive evaluation.

## 2023-02-14 NOTE — ASSESSMENT & PLAN NOTE
H/o right MCA CVA 06/2009 - s/p CEA at the time by vascular  Subsequent right carotid artery stent placement by IR 09/2020    ASA  noncomplaint with statin - restart statin

## 2023-02-14 NOTE — ED NOTES
Triage RN notified charge RN about pt, pt seen up front at charge desk by ERP. Per ERP pt upgraded to Stroke Alert. Assist RN at chairside for pt.

## 2023-02-14 NOTE — PROGRESS NOTES
4 Eyes Skin Assessment Completed by Shay ALLEN, and Neha TURNER RN.    Head WDL  Ears WDL  Nose WDL  Mouth WDL  Neck WDL  Breast/Chest WDL  Shoulder Blades WDL  Spine WDL  (R) Arm/Elbow/Hand WDL  (L) Arm/Elbow/Hand WDL  Abdomen WDL  Groin WDL  Scrotum/Coccyx/Buttocks WDL  (R) Leg WDL  (L) Leg WDL  (R) Heel/Foot/Toe Blanching  (L) Heel/Foot/Toe Blanching    Devices In Places Tele Box, Blood Pressure Cuff, and Pulse Ox    Interventions In Place Pillows    Possible Skin Injury No    Pictures Uploaded Into Epic N/A  Wound Consult Placed N/A  RN Wound Prevention Protocol Ordered No

## 2023-02-14 NOTE — THERAPY
Speech Language Therapy Contact Note    Patient Name: Natividad Sewell  Age:  68 y.o., Sex:  female  Medical Record #: [id number]  Today's Date: 2/14/2023    Discussed missed therapy with RN Neha and ROMI Andersen.        02/14/23 1315   Initial Contact Note    Initial Contact Note  Order Received and Verified. Speech Therapy Evaluation NOT Completed Because Patient Does Not Require Acute Speech Therapy at this Time.   Interdisciplinary Plan of Care Collaboration   IDT Collaboration with  Nursing;Nurse Practitioner   Collaboration Comments Patient already ordered a regular diet and tolerating without signs of aspiration per RN. Clear to cancel swallow evaluation by ROMI.

## 2023-02-14 NOTE — CONSULTS
"Neurology STROKE CODE H&P  Neurohospitalist Service, [facility]    Referring Physician: GINA Tejeda II*    STROKE CODE:   Chief Complaint   Patient presents with    Loss of Vision     Right eye, pt feels like she has a grey film over her right eye, happened 45 minutes ago. Pt had a dizzy feeling for the last couple of days. Hx of stroke in 2009, had a stent placed in her right carotid two years ago, and the left side was 40% occluded at that time.        To obtain the most accurate data regarding the time called, and time patient seen, refer to the stroke run-sheet and chart.  For time of CT, refer to the radiology report. See A&P below for TPA Decision and door to needle time if and when applicable.    HPI: 68-year-old female history of right parietal infarct in 2009.  Status post right carotid enterectomy.  Followed by stenting of the right carotid 2 years ago.  On aspirin.  Denies diabetes or hypertension.  This evening noticed right visual loss.  The visual changes only in the right eyelid.  Painless.  Mostly superior.  Denies any weakness or numbness in extremities.  No change in speech patterns.  Uses a cane at baseline.  Onset was approximately 11 PM.      Review of systems: In addition to what is detailed in the HPI above, (and scanned into the chart if and when applicable), all other systems reviewed and are negative.    Past Medical History:    has a past medical history of Arthritis, Dental disorder, High cholesterol, Hypertension, Migraines, neuralgic, Mitral valve prolapse, and Stroke (HCC) (6/04/09).    FHx:  No early infarct  SHx:   reports that she has been smoking cigarettes. She started smoking about 42 years ago. She has a 40.00 pack-year smoking history. She has never used smokeless tobacco. She reports current drug use. Drug: Inhaled. She reports that she does not drink alcohol.    Allergies:  Allergies   Allergen Reactions    Morphine      \"I don't know, it was " "during my recovery after the endarterectomy-they told me I had a bad reaction to it\"       Medications:    Current Facility-Administered Medications:     [COMPLETED] iohexol (OMNIPAQUE) 350 mg/mL (IV), 40 mL, Intravenous, Once, Herbie Dodd II, M.D., 40 mL at 02/14/23 0100    [COMPLETED] iohexol (OMNIPAQUE) 350 mg/mL (IV), 80 mL, Intravenous, Once, Herbie Dodd II, M.D., 80 mL at 02/14/23 0115    Current Outpatient Medications:     aspirin EC (ECOTRIN) 81 MG Tablet Delayed Response, Take 81 mg by mouth every day. Indications: Excision of Thickened Areas Within the Carotid Arteries, Disp: , Rfl:     clopidogrel (PLAVIX) 75 MG Tab, Take 75 mg by mouth every day. Indications: Carotid Artery Stenting, Disp: , Rfl:     Physical Examination:    Vitals:    02/14/23 0002 02/14/23 0003 02/14/23 0006   BP: (!) 168/102 (!) 183/95    Pulse: 100     Resp: 18     Temp: 37.1 °C (98.8 °F)     TempSrc: Temporal     SpO2: 96%     Weight:   79.4 kg (175 lb)   Height:   1.626 m (5' 4\")       General: Patient is awake and in no acute distress  Eyes: Unremarkable   CV: RRR    NEUROLOGICAL EXAM:     Mental status: Awake, alert and fully oriented, follows commands  Speech and language: speech is clear and fluent. The patient is able to name and repeat.  Cranial nerve exam: Pupils are equal, round and reactive to light bilaterally.  Visual acuity decreased in the right eye.  Left eye has full visual field and acuity.  Right eye has decreased field superiorly.    Extraocular muscles are intact. Sensation in the face is intact to light touch. Face is symmetric. Hearing to finger rub equal. Palate elevates symmetrically. Shoulder shrug is full. Tongue is midline.  Motor exam: Strength is 5/5 in all extremities both distally and proximally. Tone is normal. No abnormal movements were seen on exam.  Sensory exam: No sensory deficits identified   Deep tendon reflexes:  2+ and symmetric. Toes down-going bilaterally.  Coordination: no " ataxia   Gait: Uses a cane but at baseline.    NIH Stroke Scale:    1a. Level of Consciousness (Alert, drowsy, etc): 0= Alert    1b. LOC Questions (Month, age): 0= Answers both correctly    1c. LOC Commands (Open/close eyes make fist/let go): 0= Obeys both correctly    2.   Best Gaze (Eyes open - patient follows examiner's finger on face): 0= Normal    3.   Visual Fields (introduce visual stimulus/threat to patient's field quadrants): 1= Partial Hemiania  4.   Facial Paresis (Show teeth, raise eyebrows and squeeze eyes shut): 0= Normal     5a. Motor Arm - Left (Elevate arm to 90 degrees if patient is sitting, 45 degrees if  supine): 0= No drift    5b. Motor Arm - Right (Elevate arm to 90 degrees if patient is sitting, 45 degrees if supine): 0= No drift    6a. Motor Leg - Left (Elevate leg 30 degrees with patient supine): 0= No drift    6b. Motor Leg - Right  (Elevate leg 30 degrees with patient supine): 0= No drift    7.   Limb Ataxia (Finger-nose, heel down shin): 0= No ataxia    8.   Sensory (Pin prick to face, arm, trunk and leg - compare side to side): 0= Normal    9.  Best Language (Name item, describe a picture and read sentences): 0= No aphasia    10. Dysarthria (Evaluate speech clarity by patient repeating listed words): 0= Normal articulation    11. Extinction and Inattention (Use information from prior testing to identify neglect or  double simultaneous stimuli testing): 0= No neglect    Total NIH Score: 1    Modified [place]ardo Scale (MRS): 1 = No significant disability, despite symptoms; able to perform all usual duties and activities      Objective Data:    Labs:  Lab Results   Component Value Date/Time    PROTHROMBTM 12.4 09/11/2020 10:27 AM    INR 0.89 09/11/2020 10:27 AM      Lab Results   Component Value Date/Time    WBC 7.1 02/14/2023 12:23 AM    RBC 5.32 02/14/2023 12:23 AM    HEMOGLOBIN 15.4 02/14/2023 12:23 AM    HEMATOCRIT 46.4 02/14/2023 12:23 AM    MCV 87.2 02/14/2023 12:23 AM    MCH 28.9  02/14/2023 12:23 AM    MCHC 33.2 (L) 02/14/2023 12:23 AM    MPV 9.2 02/14/2023 12:23 AM    NEUTSPOLYS 57.90 02/14/2023 12:23 AM    LYMPHOCYTES 33.60 02/14/2023 12:23 AM    MONOCYTES 6.60 02/14/2023 12:23 AM    EOSINOPHILS 1.10 02/14/2023 12:23 AM    BASOPHILS 0.40 02/14/2023 12:23 AM      Lab Results   Component Value Date/Time    SODIUM 138 09/17/2020 04:15 AM    POTASSIUM 4.2 09/17/2020 04:15 AM    CHLORIDE 108 09/17/2020 04:15 AM    CO2 20 09/17/2020 04:15 AM    GLUCOSE 105 (H) 09/17/2020 04:15 AM    BUN 13 09/17/2020 04:15 AM    CREATININE 0.46 (L) 09/17/2020 04:15 AM      Lab Results   Component Value Date/Time    CHOLSTRLTOT 220 (H) 09/17/2020 04:15 AM     (H) 09/17/2020 04:15 AM    HDL 42 09/17/2020 04:15 AM    TRIGLYCERIDE 194 (H) 09/17/2020 04:15 AM       Lab Results   Component Value Date/Time    ALKPHOSPHAT 59 03/15/2012 10:57 AM    ASTSGOT 32 03/15/2012 10:57 AM    ALTSGPT 27 03/15/2012 10:57 AM    TBILIRUBIN 0.7 03/15/2012 10:57 AM        Imaging/Testing:  CT-HEAD W/O   Final Result         1.  No acute intracranial abnormality.   2.  Atherosclerosis.         DX-CHEST-PORTABLE (1 VIEW)    (Results Pending)   CT-CEREBRAL PERFUSION ANALYSIS    (Results Pending)   CT-CTA NECK WITH & W/O-POST PROCESSING    (Results Pending)   CT-CTA HEAD WITH & W/O-POST PROCESS    (Results Pending)         Assessment and Plan:    68-year-old female presents with monocular vision changes in superior-inferior on the right side only.  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  This is not indication for thrombolytics.  Continue secondary stroke risk modifications including antiplatelet therapy, LDL goal below 70, normal blood pressures, normoglycemia, no smoking given history of stroke in carotid disease.  Can consider ophthalmology consult if MRI brain is negative.  Neurology will sign off please call back if any additional questions.        The evaluation of the patient, and recommended management, was discussed  with the resident staff.     This chart was partially generated using voice recognition technology and sound alike word replacement may be present, best efforts were made to make the chart accurate.    Giovani Conti MD  Board Certified Neurology, ABPN  t) [phone]

## 2023-02-14 NOTE — HOSPITAL COURSE
Ms. Natividad Sewell is a 68 y.o. female with history of right MCA CVA 06/2009, right-sided carotid artery stenosis s/p CEA 06/2009 and right-sided carotid artery stent placement 09/2020 who presented 2/14/2023 with evaluation for right-sided vision loss.      Patient reported symptoms occur approximately at 11 PM on 2/13/2023.  She came in as a stroke alert.  No acute etiology seen on CT head.  CTA head notable for distal right M3 nonopacification, likely chronic occlusion related to prior infarct, otherwise no LVO.  CTA neck > 70% stenosis of left ICA.  No perfusion defect seen on CT perfusion.  Patient was eval by neurology, tPA not recommended, noninvasive medical management and MRI brain.     During this course of stay, an MRI was obtained noting a small focus of hyperintensity with T2 signal abnormality in the right paramedian davey suspicious for a subacute infarct with encephalomalacia from remote right parietal infarct.    Patient seen and examined prior to being discharged.  Discussed MRI results with patient.  After extensive discussion with patient, since 2009, patient has not been placed on any anticoagulation as patient has not followed up with any of her providers.  Patient will be placed on low-dose aspirin and atorvastatin for secondary prevention of stroke/TIA.  Patient referred to follow-up with ophthalmology along with outpatient OT for vision therapy.  Patient highly recommended to follow-up with PCP and establish for management of care.  Referral placed for follow-up with neurology due to significant history of CVA and carotid stenosis.  Patient to resume all other home medications.  All questions and concerns answered prior to being discharged.  Patient discharged home.

## 2023-02-14 NOTE — H&P
Hospital Medicine History & Physical Note    Date of Service  2/14/2023    Primary Care Physician  Pcp Pt States None    Consultants  Neurology - signed off    Code Status  Full Code    Chief Complaint  Chief Complaint   Patient presents with    Loss of Vision     Right eye, pt feels like she has a grey film over her right eye, happened 45 minutes ago. Pt had a dizzy feeling for the last couple of days. Hx of stroke in 2009, had a stent placed in her right carotid two years ago, and the left side was 40% occluded at that time.        History of Presenting Illness  Natividad Sewell is a 68 y.o. female with history of right MCA CVA 06/2009, right-sided carotid artery stenosis s/p CEA 06/2009 and right-sided carotid artery stent placement 09/2020 who presented 2/14/2023 with evaluation for right-sided vision loss.  Patient reported symptoms occur approximately at 11 PM on 2/13/2023.  She came in as a stroke alert.  No acute etiology seen on CT head.  CTA head notable for distal right M3 nonopacification, likely chronic occlusion related to prior infarct, otherwise no LVO.  CTA neck > 70% stenosis of left ICA.  No perfusion defect seen on CT perfusion.  Patient was eval by neurology, tPA not recommended, noninvasive medical management and MRI brain.  Admitted to medicine service.    I discussed the plan of care with patient and bedside RN.    Review of Systems  Review of Systems   Constitutional:  Negative for chills and fever.   HENT:  Negative for congestion and hearing loss.    Eyes:  Positive for blurred vision. Negative for double vision, photophobia, pain, discharge and redness.        Upper quadrants vision loss of right eye   Respiratory:  Negative for cough and shortness of breath.    Cardiovascular:  Negative for chest pain and palpitations.   Gastrointestinal:  Negative for heartburn and nausea.   Genitourinary:  Negative for dysuria and urgency.   Musculoskeletal:  Negative for joint pain and myalgias.  "  Skin:  Negative for itching and rash.   Neurological:  Negative for dizziness, tingling, tremors, sensory change, speech change and headaches.   Endo/Heme/Allergies:  Negative for environmental allergies. Does not bruise/bleed easily.   Psychiatric/Behavioral:  Negative for depression and substance abuse.    All other systems reviewed and are negative.    Past Medical History   has a past medical history of Arthritis, Dental disorder, High cholesterol, Hypertension, Migraines, neuralgic, Mitral valve prolapse, and Stroke (HCC) (6/04/09).    Surgical History   has a past surgical history that includes carotid endarterectomy (6/6/2009) and gyn surgery (Bilateral).     Family History  family history is not on file.   Family history reviewed with patient. There is no family history that is pertinent to the chief complaint.     Social History   reports that she has been smoking cigarettes. She started smoking about 42 years ago. She has a 40.00 pack-year smoking history. She has never used smokeless tobacco. She reports current drug use. Drug: Inhaled. She reports that she does not drink alcohol.    Allergies  Allergies   Allergen Reactions    Morphine      \"I don't know, it was during my recovery after the endarterectomy-they told me I had a bad reaction to it\"       Medications  Prior to Admission Medications   Prescriptions Last Dose Informant Patient Reported? Taking?   aspirin EC (ECOTRIN) 81 MG Tablet Delayed Response  Patient Yes No   Sig: Take 81 mg by mouth every day. Indications: Excision of Thickened Areas Within the Carotid Arteries   clopidogrel (PLAVIX) 75 MG Tab  Patient Yes No   Sig: Take 75 mg by mouth every day. Indications: Carotid Artery Stenting      Facility-Administered Medications: None       Physical Exam  Temp:  [37.1 °C (98.8 °F)] 37.1 °C (98.8 °F)  Pulse:  [] 86  Resp:  [18] 18  BP: (127-183)/() 127/60  SpO2:  [96 %] 96 %  Blood Pressure : 127/60   Temperature: 37.1 °C (98.8 °F) "   Pulse: 86   Respiration: 18   Pulse Oximetry: 96 %       Physical Exam  Constitutional:       General: She is not in acute distress.  HENT:      Head: Normocephalic and atraumatic.      Nose: Nose normal.      Mouth/Throat:      Mouth: Mucous membranes are moist.      Pharynx: Oropharynx is clear.   Eyes:      General: No scleral icterus.        Right eye: No discharge.         Left eye: No discharge.      Extraocular Movements: Extraocular movements intact.      Conjunctiva/sclera: Conjunctivae normal.   Cardiovascular:      Rate and Rhythm: Normal rate and regular rhythm.      Pulses: Normal pulses.      Heart sounds:     No friction rub.   Pulmonary:      Effort: Pulmonary effort is normal. No respiratory distress.      Breath sounds: No stridor. No wheezing or rales.   Abdominal:      General: There is no distension.      Tenderness: There is no abdominal tenderness. There is no guarding or rebound.   Musculoskeletal:         General: No tenderness. Normal range of motion.      Cervical back: Neck supple. No tenderness.   Skin:     General: Skin is warm and dry.      Capillary Refill: Capillary refill takes less than 2 seconds.   Neurological:      Mental Status: She is alert and oriented to person, place, and time.      Sensory: No sensory deficit.      Coordination: Coordination normal.      Comments: No dysarthria  No facial droop  No pronator drift    Deficit of upper quadrants of right eye vision   Psychiatric:         Mood and Affect: Mood normal.       Laboratory:  Recent Labs     02/14/23  0023   WBC 7.1   RBC 5.32   HEMOGLOBIN 15.4   HEMATOCRIT 46.4   MCV 87.2   MCH 28.9   MCHC 33.2*   RDW 43.1   PLATELETCT 259   MPV 9.2     Recent Labs     02/14/23  0023   SODIUM 142   POTASSIUM 4.0   CHLORIDE 106   CO2 23   GLUCOSE 123*   BUN 20   CREATININE 0.58   CALCIUM 9.6     Recent Labs     02/14/23  0023   ALTSGPT 18   ASTSGOT 19   ALKPHOSPHAT 90   TBILIRUBIN 0.2   GLUCOSE 123*     Recent Labs      02/14/23  0023   APTT 29.0   INR 1.01     No results for input(s): NTPROBNP in the last 72 hours.      Recent Labs     02/14/23  0023   TROPONINT 16       Imaging:  DX-CHEST-PORTABLE (1 VIEW)   Final Result         1.  Bibasilar atelectasis or early infiltrates.   2.  Atherosclerosis      CT-CTA HEAD WITH & W/O-POST PROCESS   Final Result         1.  Distal right M3 branch of nonopacification leading to area of encephalomalacia, likely chronic occlusion related to prior infarction and stable since prior study.   2.  Otherwise no large vessel occlusion or aneurysm identified      CT-CTA NECK WITH & W/O-POST PROCESSING   Final Result         1.  Dense calcification of the origin the left common carotid artery resulting in greater than 70% stenosis.         CT-CEREBRAL PERFUSION ANALYSIS   Final Result         1.  Cerebral blood flow less than 30% likely representing completed infarct = 0 mL.      2.  T Max more than 6 seconds likely representing combination of completed infarct and ischemia = 0 mL.      3.  Mismatched volume likely representing ischemic brain/penumbra = None      4.  Please note that the cerebral perfusion was performed on the limited brain tissue around the basal ganglia region. Infarct/ischemia outside the CT perfusion sections can be missed in this study.      CT-HEAD W/O   Final Result         1.  No acute intracranial abnormality.   2.  Atherosclerosis.         MR-BRAIN-W/O    (Results Pending)       X-Ray:  I have personally reviewed the images and compared with prior images.  EKG:  I have personally reviewed the images and compared with prior images.    Assessment/Plan:  Justification for Admission Status  I anticipate this patient is appropriate for observation status at this time.      * Stroke-like symptom- (present on admission)  Assessment & Plan  Right upper quadrant vision loss of right eye  CTA head: Distal right M3 nonopacification, likely chronic occlusion  CTA neck: >70% left ICA  stenosis  No acute etiology seen on CT head, no perfusion defect on perfusion scan    H/o right sided CVA and prior right CEA and subsequent carotid stent placement    ASA/statin  Follow-up MRI brain    Neurology signed off, recommendations appreciated --- medical management, follow MRI brain, Consider ophthalmology consult if MRI brain negative      Vision loss of right eye  Assessment & Plan  As above    H/O: CVA (cerebrovascular accident)  Assessment & Plan  H/o right MCA CVA 06/2009 - s/p CEA at the time by vascular  Subsequent right carotid artery stent placement by IR 09/2020    ASA  noncomplaint with statin - restart statin    Other hyperlipidemia- (present on admission)  Assessment & Plan  Stated she has not been taking statin  Restart statin    Hypertension  Assessment & Plan  Per history  Although pt states she is not taking any anti-HTN meds  Low dose ACEI    Bilateral carotid artery stenosis  Assessment & Plan  ASA/statin    Right: s/p CEA 06/2009, s/p stent placement 09/2020    Left: >70% ICA stenosis on CTA    F/u MRI brain, correlate clinically        VTE prophylaxis: enoxaparin ppx

## 2023-02-14 NOTE — ASSESSMENT & PLAN NOTE
ASA/statin    Right: s/p CEA 06/2009, s/p stent placement 09/2020    Left: >70% ICA stenosis on CTA    F/u MRI brain, correlate clinically

## 2023-02-14 NOTE — ED NOTES
Report from Brittany ROGER. Pt returned to red 8 from CT scan. Neurology at bedside.  Pt a&ox4, no needs at this time.

## 2023-02-15 ENCOUNTER — PHARMACY VISIT (OUTPATIENT)
Dept: PHARMACY | Facility: MEDICAL CENTER | Age: 68
End: 2023-02-15
Payer: COMMERCIAL

## 2023-02-15 ENCOUNTER — APPOINTMENT (OUTPATIENT)
Dept: RADIOLOGY | Facility: MEDICAL CENTER | Age: 68
End: 2023-02-15
Attending: NURSE PRACTITIONER
Payer: MEDICARE

## 2023-02-15 VITALS
TEMPERATURE: 98.2 F | WEIGHT: 173.06 LBS | HEIGHT: 64 IN | DIASTOLIC BLOOD PRESSURE: 58 MMHG | HEART RATE: 64 BPM | RESPIRATION RATE: 18 BRPM | BODY MASS INDEX: 29.55 KG/M2 | OXYGEN SATURATION: 92 % | SYSTOLIC BLOOD PRESSURE: 104 MMHG

## 2023-02-15 PROBLEM — H54.61 VISION LOSS OF RIGHT EYE: Status: RESOLVED | Noted: 2023-02-14 | Resolved: 2023-02-15

## 2023-02-15 PROBLEM — R29.90 STROKE-LIKE SYMPTOM: Status: RESOLVED | Noted: 2023-02-14 | Resolved: 2023-02-15

## 2023-02-15 LAB
EST. AVERAGE GLUCOSE BLD GHB EST-MCNC: 128 MG/DL
HBA1C MFR BLD: 6.1 % (ref 4–5.6)

## 2023-02-15 PROCEDURE — RXMED WILLOW AMBULATORY MEDICATION CHARGE: Performed by: NURSE PRACTITIONER

## 2023-02-15 PROCEDURE — G0378 HOSPITAL OBSERVATION PER HR: HCPCS

## 2023-02-15 PROCEDURE — 70551 MRI BRAIN STEM W/O DYE: CPT

## 2023-02-15 PROCEDURE — 97165 OT EVAL LOW COMPLEX 30 MIN: CPT

## 2023-02-15 PROCEDURE — A9270 NON-COVERED ITEM OR SERVICE: HCPCS | Performed by: STUDENT IN AN ORGANIZED HEALTH CARE EDUCATION/TRAINING PROGRAM

## 2023-02-15 PROCEDURE — 700102 HCHG RX REV CODE 250 W/ 637 OVERRIDE(OP): Performed by: STUDENT IN AN ORGANIZED HEALTH CARE EDUCATION/TRAINING PROGRAM

## 2023-02-15 RX ORDER — ATORVASTATIN CALCIUM 40 MG/1
40 TABLET, FILM COATED ORAL NIGHTLY
Qty: 90 TABLET | Refills: 0 | Status: SHIPPED | OUTPATIENT
Start: 2023-02-15 | End: 2023-02-27

## 2023-02-15 RX ORDER — ASPIRIN 81 MG/1
81 TABLET ORAL DAILY
Qty: 90 TABLET | Refills: 0 | Status: SHIPPED | OUTPATIENT
Start: 2023-02-15 | End: 2023-05-16

## 2023-02-15 RX ADMIN — ASPIRIN 81 MG: 81 TABLET, COATED ORAL at 05:04

## 2023-02-15 ASSESSMENT — PATIENT HEALTH QUESTIONNAIRE - PHQ9
SUM OF ALL RESPONSES TO PHQ9 QUESTIONS 1 AND 2: 0
2. FEELING DOWN, DEPRESSED, IRRITABLE, OR HOPELESS: NOT AT ALL
1. LITTLE INTEREST OR PLEASURE IN DOING THINGS: NOT AT ALL

## 2023-02-15 ASSESSMENT — COGNITIVE AND FUNCTIONAL STATUS - GENERAL
DAILY ACTIVITIY SCORE: 23
HELP NEEDED FOR BATHING: A LITTLE
SUGGESTED CMS G CODE MODIFIER DAILY ACTIVITY: CI

## 2023-02-15 ASSESSMENT — ACTIVITIES OF DAILY LIVING (ADL): TOILETING: INDEPENDENT

## 2023-02-15 ASSESSMENT — PAIN DESCRIPTION - PAIN TYPE: TYPE: ACUTE PAIN

## 2023-02-15 NOTE — THERAPY
Occupational Therapy   Initial Evaluation     Patient Name: Natividad Sewell  Age:  68 y.o., Sex:  female  Medical Record #: [id number]  Today's Date: 2/15/2023     Precautions  Comments: visual field cut    Assessment  Patient is 68 y.o. female admitted for R vision changes getting w/u for stroke; found to have right paramedian davey which is suspicious for a subacute infarct and R encephalomalacia suggesting R parietal infarct. PMHx of R MCA CVA, R sided carotid stenosis s/p CEA, and R carotid stent placement. Pt reports she is at functional baseline for ADL/txfs and has been getting up with nsg; vision is only deficit. Pt with visual field cut above midline in B upper quadrants on R eye. Reports its improving from midline up and able to see more in upper visual field since yesterday. Can only see grey in B upper quandrants and reports it is uncomfortable for R eye to look laterally at end range and lower diagonal. Pt educated on outpatient vision therapy and allowing someone else to do driving. Pt reports lives with SO who is available to assist PRN 24/7. Patient will not be actively followed for occupational therapy services at this time, however may be seen if requested by physician for 1 more visit within 30 days to address any discharge or equipment needs.     Plan    Occupational Therapy Initial Treatment Plan   Duration: Other (See Comments) (d/c needs only)    DC Equipment Recommendations: None  Discharge Recommendations: Other - (outpatient vision therapy)      Objective     02/15/23 1018   Prior Living Situation   Prior Services Home-Independent   Housing / Facility 1 Story House   Equipment Owned None   Lives with - Patient's Self Care Capacity Significant Other   Comments Pt reports lives with SO who is available to assist PRN 24/7. He drives pt usually.   Prior Level of ADL Function   Self Feeding Independent   Grooming / Hygiene Independent   Bathing Independent   Dressing Independent   Toileting  Independent   Prior Level of IADL Function   Medication Management Independent   Laundry Independent   Kitchen Mobility Independent   Finances Independent   Home Management Independent   Shopping Independent   Prior Level Of Mobility Independent Without Device in Community;Independent Without Device in Home   Driving / Transportation Relatives / Others Provide Transportation   Occupation (Pre-Hospital Vocational) Not Employed   Precautions   Comments visual field cut   Vitals   O2 Delivery Device Room air w/o2 available   Vitals Comments SpO2 >90% throughout session   Pain 0 - 10 Group   Therapist Pain Assessment During Activity;Post Activity Pain Same as Prior to Activity;Nurse Notified;0   Cognition    Cognition / Consciousness WDL   Level of Consciousness Alert   Comments pleasant and cooperative   Passive ROM Upper Body   Passive ROM Upper Body WDL   Active ROM Upper Body   Active ROM Upper Body  WDL   Strength Upper Body   Upper Body Strength  WDL   Bed Mobility    Supine to Sit Supervised   Sit to Supine Supervised   Scooting Supervised   Comments sititng up in bed during session   ADL Assessment   Comments Pt reports she is at functional baseline for ADL/txfs; admitted for visual deficits only   mRS Prior to admission   Prior to admission mRS 1   Modified [place] (mRS)   Modified [place] Score 1   Visual Perception   Visual Perception  X   Comments visual field cut above midline in B upper quadrants on R eye. Reports its improving from midline up and able to see more in upper visual field since yesterday. Can only see grey in B upper quandrants and reports it is uncomfortable for R eye to look laterally at end range and lower diagonal. educated on outpatient vision therapy and allowing someone else to do driving   Edema / Skin Assessment   Edema / Skin  Not Assessed   Education Group   Education Provided Role of Occupational Therapist;Other (comments)  (vision)   Role of Occupational Therapist Patient Response  Patient;Acceptance;Explanation;Verbal Demonstration;Reinforcement Needed   Additional Comments vision

## 2023-02-15 NOTE — DISCHARGE PLANNING
[facility] Transitional [facility] Coordination    Follow up for Rehab.  Current documentation does not reflect ongoing acute therapy need in 2 of 3 disciplines meeting CMS criteria for IRF level care.  PMR referral will not be forwarded for consult.  If there are interval changes, please reach out to Rehab [facility] [phone].  Please reach out if PMR consult requested for medical management.

## 2023-02-15 NOTE — PROGRESS NOTES
"Hospital Medicine Daily Progress Note    Date of Service  2/14/2023    Chief Complaint  Natividad Sewell is a 68 y.o. female admitted 2/14/2023 with partial loss of right-sided vision    Hospital Course  Natividad Sewell is a 68 y.o. female with history of right MCA CVA 06/2009, right-sided carotid artery stenosis s/p CEA 06/2009 and right-sided carotid artery stent placement 09/2020 who presented 2/14/2023 with evaluation for right-sided vision loss.  Patient reported symptoms occur approximately at 11 PM on 2/13/2023.  She came in as a stroke alert.  No acute etiology seen on CT head.  CTA head notable for distal right M3 nonopacification, likely chronic occlusion related to prior infarct, otherwise no LVO.  CTA neck > 70% stenosis of left ICA.  No perfusion defect seen on CT perfusion.  Patient was eval by neurology, tPA not recommended, noninvasive medical management and MRI brain.     Interval Problem Update  Patient is sitting upright in bed, fully alert and oriented.  Son at bedside.  Patient reports her right-sided vision is normal from eye level down, however gray above the midline of her vision. The gray area is becoming \"lighter.\" It has not decreased in size.  She denies headache, dizziness, nausea, confusion and any other problems.  -Patient speech and cognition are normal, canceled speech and cognitive evaluation  -Continuing to await MRI  -Reviewed risks of smoking    I have discussed this patient's plan of care and discharge plan at IDT rounds today with Case Management, Nursing, Nursing leadership, and other members of the IDT team.    Consultants/Specialty  neurology    Code Status  Full Code    Disposition  Patient is medically cleared pending MRI  for discharge.   Anticipate discharge to to home with close outpatient follow-up.  I have placed the appropriate orders for post-discharge needs.    Review of Systems  Review of Systems   Constitutional:  Negative for chills, fever and " malaise/fatigue.   HENT:  Negative for congestion and hearing loss.    Eyes:  Positive for blurred vision. Negative for double vision, photophobia, pain, discharge and redness.        Upper quadrants vision loss of right eye   Respiratory:  Negative for cough and shortness of breath.    Cardiovascular:  Negative for chest pain and palpitations.   Gastrointestinal:  Negative for heartburn and nausea.   Genitourinary:  Negative for dysuria and urgency.   Musculoskeletal:  Negative for joint pain and myalgias.   Skin:  Negative for itching and rash.   Neurological:  Negative for dizziness, tingling, tremors, sensory change, speech change, weakness and headaches.   Endo/Heme/Allergies:  Negative for environmental allergies. Does not bruise/bleed easily.   Psychiatric/Behavioral:  Negative for depression and substance abuse. The patient is not nervous/anxious.    All other systems reviewed and are negative.     Physical Exam  Temp:  [35.9 °C (96.7 °F)-37.1 °C (98.8 °F)] 36.4 °C (97.5 °F)  Pulse:  [] 62  Resp:  [16-33] 20  BP: (121-183)/() 121/59  SpO2:  [88 %-96 %] 92 %    Physical Exam  Vitals and nursing note reviewed.   Constitutional:       General: She is awake. She is not in acute distress.     Appearance: She is not ill-appearing.   HENT:      Head: Normocephalic and atraumatic.      Nose: Nose normal.      Mouth/Throat:      Mouth: Mucous membranes are moist.      Pharynx: Oropharynx is clear.   Eyes:      General: Lids are normal. Visual field deficit present. No scleral icterus.        Right eye: No discharge.         Left eye: No discharge.      Extraocular Movements: Extraocular movements intact.      Right eye: Normal extraocular motion and no nystagmus.      Left eye: Normal extraocular motion and no nystagmus.      Conjunctiva/sclera: Conjunctivae normal.      Right eye: Right conjunctiva is not injected. No chemosis, exudate or hemorrhage.     Left eye: Left conjunctiva is not injected. No  chemosis, exudate or hemorrhage.     Pupils: Pupils are equal, round, and reactive to light.   Cardiovascular:      Rate and Rhythm: Normal rate and regular rhythm.      Pulses: Normal pulses.      Heart sounds:     No friction rub.   Pulmonary:      Effort: Pulmonary effort is normal. No respiratory distress.      Breath sounds: No stridor. No wheezing or rales.   Abdominal:      General: There is no distension.      Tenderness: There is no abdominal tenderness. There is no guarding or rebound.   Musculoskeletal:         General: No tenderness. Normal range of motion.   Skin:     General: Skin is warm and dry.      Capillary Refill: Capillary refill takes less than 2 seconds.   Neurological:      Mental Status: She is alert and oriented to person, place, and time.      Sensory: No sensory deficit.      Coordination: Coordination normal.      Comments: No dysarthria  No facial droop  No pronator drift    Deficit of upper quadrants of right eye vision   Psychiatric:         Attention and Perception: Attention normal.         Mood and Affect: Mood normal.         Speech: Speech normal.         Behavior: Behavior is cooperative.       Fluids  No intake or output data in the 24 hours ending 02/14/23 1656    Laboratory  Recent Labs     02/14/23 0023 02/14/23  0428   WBC 7.1 6.5   RBC 5.32 5.01   HEMOGLOBIN 15.4 14.5   HEMATOCRIT 46.4 43.6   MCV 87.2 87.0   MCH 28.9 28.9   MCHC 33.2* 33.3*   RDW 43.1 43.6   PLATELETCT 259 238   MPV 9.2 9.5     Recent Labs     02/14/23  0023 02/14/23  0428   SODIUM 142 137   POTASSIUM 4.0 4.1   CHLORIDE 106 103   CO2 23 22   GLUCOSE 123* 98   BUN 20 18   CREATININE 0.58 0.54   CALCIUM 9.6 9.1     Recent Labs     02/14/23  0023   APTT 29.0   INR 1.01         Recent Labs     02/14/23  0428   TRIGLYCERIDE 133   HDL 53   *       Imaging  DX-CHEST-PORTABLE (1 VIEW)   Final Result         1.  Bibasilar atelectasis or early infiltrates.   2.  Atherosclerosis      CT-CTA HEAD WITH &  W/O-POST PROCESS   Final Result         1.  Distal right M3 branch of nonopacification leading to area of encephalomalacia, likely chronic occlusion related to prior infarction and stable since prior study.   2.  Otherwise no large vessel occlusion or aneurysm identified      CT-CTA NECK WITH & W/O-POST PROCESSING   Final Result         1.  Dense calcification of the origin the left common carotid artery resulting in greater than 70% stenosis.         CT-CEREBRAL PERFUSION ANALYSIS   Final Result         1.  Cerebral blood flow less than 30% likely representing completed infarct = 0 mL.      2.  T Max more than 6 seconds likely representing combination of completed infarct and ischemia = 0 mL.      3.  Mismatched volume likely representing ischemic brain/penumbra = None      4.  Please note that the cerebral perfusion was performed on the limited brain tissue around the basal ganglia region. Infarct/ischemia outside the CT perfusion sections can be missed in this study.      CT-HEAD W/O   Final Result         1.  No acute intracranial abnormality.   2.  Atherosclerosis.         MR-BRAIN-W/O    (Results Pending)        Assessment/Plan  * Stroke-like symptom- (present on admission)  Assessment & Plan  Right upper quadrant vision loss of right eye  CTA head: Distal right M3 nonopacification, likely chronic occlusion  CTA neck: >70% left ICA stenosis  No acute etiology seen on CT head, no perfusion defect on perfusion scan    H/o right sided CVA and prior right CEA and subsequent carotid stent placement    ASA/statin  Follow-up MRI brain    Neurology signed off, recommendations appreciated --- medical management, follow MRI brain, Consider ophthalmology consult if MRI brain negative      Vision loss of right eye- (present on admission)  Assessment & Plan  As above    H/O: CVA (cerebrovascular accident)- (present on admission)  Assessment & Plan  H/o right MCA CVA 06/2009 - s/p CEA at the time by vascular  Subsequent  right carotid artery stent placement by IR 09/2020    ASA  noncomplaint with statin - restart statin    Other hyperlipidemia- (present on admission)  Assessment & Plan  Stated she has not been taking statin  Restart statin    Hypertension- (present on admission)  Assessment & Plan  Per history  Although pt states she is not taking any anti-HTN meds  Low dose ACEI    Bilateral carotid artery stenosis- (present on admission)  Assessment & Plan  ASA/statin    Right: s/p CEA 06/2009, s/p stent placement 09/2020    Left: >70% ICA stenosis on CTA    F/u MRI brain, correlate clinically         VTE prophylaxis: SCDs/TEDs, patient is ambulatory    I have performed a physical exam and reviewed and updated ROS and Plan today (2/14/2023). In review of yesterday's note (2/13/2023), there are no changes except as documented above.      My total time spent caring for the patient on the day of the encounter was 15 minutes.   This does not include time spent on separately billable procedures/tests. This time is exclusive of the time spent with collaborating physicians.

## 2023-02-15 NOTE — THERAPY
Physical Therapy Contact Note    Patient Name: Natividad Sewell  Age:  68 y.o., Sex:  female  Medical Record #: [id number]  Today's Date: 2/15/2023    PT Consult received/acknowledged. Pt primary deficit is vision - no mobility concerns - Pt is up self per RN. No acute PT eval indicated at this time.    Tawanna Calix, PT, DPT  Ext. [phone]

## 2023-02-15 NOTE — PROGRESS NOTES
Resting in bed. States sight is starting to clear up. Still feels like there is a gray film, but overall improving.

## 2023-02-15 NOTE — DISCHARGE INSTRUCTIONS
FOLLOW UP ITEMS POST DISCHARGE  Please call [phone] to schedule PCP appointment for patient.    Required specialty appointments include:       Discharge Instructions per VIVI Tinsley    -Establish with a PCP and follow-up s/p hospitalization  -Referral placed to neurology due to significant history of CVA and carotid stenosis  -Referral placed to ophthalmology for management of vision loss  -Referral placed to outpatient OT for vision therapy  -Start taking aspirin 81 mg daily along with atorvastatin 40 mg nightly for secondary prevention of stroke/TIA  -Continue all other home medications    DIET: As tolerated    ACTIVITY: As tolerated    DIAGNOSIS: Vision loss    Return to ER if symptoms persist, chest pain, palpitations, shortness of breath, numbness, tingling, weakness, and high fevers.

## 2023-02-15 NOTE — CARE PLAN
The patient is Stable - Low risk of patient condition declining or worsening    Shift Goals  Clinical Goals: MRI, neuro checks  Patient Goals: rest    Progress made toward(s) clinical / shift goals:        Problem: Knowledge Deficit - Standard  Goal: Patient and family/care givers will demonstrate understanding of plan of care, disease process/condition, diagnostic tests and medications  Note: Plan of care: mri, neurochecks       Patient is not progressing towards the following goals:

## 2023-02-15 NOTE — CARE PLAN
The patient is Stable - Low risk of patient condition declining or worsening    Shift Goals  Clinical Goals: Monitor, neuro checks  Patient Goals: Rest, DC    Progress made toward(s) clinical / shift goals:  DC to home. Goals met    Patient is not progressing towards the following goals:

## 2023-02-15 NOTE — PROGRESS NOTES
Caller would like to discuss a letter for work . She is 2 weeks postpartum by  and asking for a letter to excuse her from taking the [employer] required by her job. She works from home and due to having her fifth c -  Section, she is unable to take deep breaths, a requirement for the classes. Please advise if letter can be written.  Writer advised caller of callback within 24-72 hours.    Patient Name: Sadaf ALMANZA Jamil  Caller Name: self  Name of Facility: [facility]  Callback Number: [phone]    Best Availability: anytime today  Can A Detailed Message Be left? yes  Fax Number: [facility]  Additional Info: Send letter to [email or web address]  Did you confirm the message with the caller?: yes    Thank you,  Sonya Hernandez     Spoke with on call PA Gigi if patient could come off tele monitor to go down to MRI. Ok for patient to come off tele per PA.

## 2023-02-25 ENCOUNTER — HOSPITAL ENCOUNTER (EMERGENCY)
Facility: MEDICAL CENTER | Age: 68
End: 2023-02-25
Attending: EMERGENCY MEDICINE
Payer: MEDICARE

## 2023-02-25 VITALS
RESPIRATION RATE: 16 BRPM | DIASTOLIC BLOOD PRESSURE: 63 MMHG | HEIGHT: 64 IN | TEMPERATURE: 98.1 F | OXYGEN SATURATION: 91 % | WEIGHT: 173 LBS | BODY MASS INDEX: 29.53 KG/M2 | HEART RATE: 72 BPM | SYSTOLIC BLOOD PRESSURE: 124 MMHG

## 2023-02-25 DIAGNOSIS — I65.22 CAROTID STENOSIS, LEFT: ICD-10-CM

## 2023-02-25 DIAGNOSIS — Z86.73 H/O: CVA (CEREBROVASCULAR ACCIDENT): ICD-10-CM

## 2023-02-25 PROCEDURE — 99283 EMERGENCY DEPT VISIT LOW MDM: CPT

## 2023-02-25 ASSESSMENT — FIBROSIS 4 INDEX: FIB4 SCORE: 1.26

## 2023-02-25 NOTE — ED NOTES
PT to room via wheelchair. PT ambulatory from wheelchair to bed. Call light in reach. Chart up for next available ERP.

## 2023-02-25 NOTE — ED PROVIDER NOTES
ED Provider Note    CHIEF COMPLAINT  Chief Complaint   Patient presents with    Sent by MD     Sent by Dr Singh for echocardiogram and bilateral carotid doppler       EXTERNAL RECORDS REVIEWED  Inpatient Notes patient recently hospitalized 2 weeks ago as an acute stroke with vision loss and at that time had 70% stenosis of the left ICA    HPI/ROS  LIMITATION TO HISTORY   Select: : None      Natividad Sewell is a 68 y.o. female who presents to the emergency department for request for an ultrasound of her carotids and echocardiogram.  The patient finally followed up with a retinal specialist after her stroke which she was seen for about 2 weeks ago.  She states that her vision is unchanged and that Dr. Singh found a clot and so he told her to come in for an echo as well as carotid Doppler.  While she was hospitalized she did have a CTA of the neck and she does have left ICA stenosis about 70%.  Patient states she is been doing well she has been taking all her medications and has had no acute change in her symptoms or her vision.    PAST MEDICAL HISTORY   has a past medical history of Arthritis, Dental disorder, High cholesterol, Hypertension, Migraines, neuralgic, Mitral valve prolapse, and Stroke (HCC) (6/04/09).    SURGICAL HISTORY   has a past surgical history that includes carotid endarterectomy (6/6/2009) and gyn surgery (Bilateral).    FAMILY HISTORY  No family history on file.    SOCIAL HISTORY  Social History     Tobacco Use    Smoking status: Every Day     Packs/day: 1.00     Years: 40.00     Pack years: 40.00     Types: Cigarettes     Start date: 9/16/1980    Smokeless tobacco: Never   Vaping Use    Vaping Use: Never used   Substance and Sexual Activity    Alcohol use: No    Drug use: Yes     Types: Inhaled     Comment: marijuana, last use 9/14/2020    Sexual activity: Not on file       CURRENT MEDICATIONS  Home Medications       Reviewed by Roslyn Adams R.N. (Registered Nurse) on 02/25/23 at 8692   "Med List Status: Not Addressed     Medication Last Dose Status   aspirin 81 MG EC tablet  Active   atorvastatin (LIPITOR) 40 MG Tab  Active                    ALLERGIES  Allergies   Allergen Reactions    Morphine      \"I don't know, it was during my recovery after the endarterectomy-they told me I had a bad reaction to it\"       PHYSICAL EXAM  VITAL SIGNS: /89   Pulse 93   Temp 36.1 °C (97 °F) (Temporal)   Resp 18   Ht 1.626 m (5' 4\")   Wt 78.5 kg (173 lb)   LMP 09/16/2009   SpO2 96%   BMI 29.70 kg/m²    Pulse Ox Interpretation:   Pulse Ox is normal   Constitutional: Alert in no apparent distress.  HENT: Normocephalic atraumatic, MMM  Eyes: PER, Conjunctiva normal, Non-icteric.   Neck: Normal range of motion, No tenderness, Supple, No stridor.   Cardiovascular: Regular rate and rhythm, no murmurs.   Thorax & Lungs: Normal breath sounds, No respiratory distress, No wheezing, No chest tenderness.   Abdomen: Bowel sounds normal, Soft, No tenderness, No pulsatile masses. No peritoneal signs.  Skin: Warm, Dry, No erythema, No rash.   Back: No bony tenderness, No CVA tenderness.   Extremities/MSK: Intact equal distal pulses, No edema, No tenderness, No cyanosis, no major deformities noted  Neurologic: Alert and oriented x3, No focal deficits noted. Slow steady gait      DIAGNOSTIC STUDIES / PROCEDURES      COURSE & MEDICAL DECISION MAKING    ED Observation Status? Yes; I am placing the patient in to an observation status due to a diagnostic uncertainty as well as therapeutic intensity. Patient placed in observation status at 3:40 PM, 2/25/2023.     Observation plan is as follows: discussion with specialist and possible imaging     Upon Reevaluation, the patient's condition has: not improved; and will be escalated to hospitalization.    Patient discharged from ED Observation status at 4:25 PM (Time) 02/25/23  (Date).     INITIAL ASSESSMENT, COURSE AND PLAN  Care Narrative patient presents with a recent stroke " with known carotid artery stenosis she has had a stent repaired on the right in the past does not appear that she was referred to vascular but patient was unaware that she had any carotid imaging during her last hospitalization somata discussed the case with her retinal specialist who sent her in    3:52 PM  Paged Dr Singh for call back     4:20 PM  Spoke w Dr Singh at this time regarding imaging that he requested and we reviewed the CT angiogram she had 2 weeks ago with a known internal carotid artery stenosis 70% and her history of noncompliance which is why she is only on aspirin.  He states he saw small plaque in her retinal artery which is why he wanted those done but now that the CTA has been done he feels comfortable the echo being done as an outpatient as well as a referral to vascular surgery and does not require emergent echocardiogram.      I discussed this with the patient she feels comfortable going home we will continue with her medications and she will be referred to vascular she states that Dr. Tomlin informed her last surgery    DISPOSITION AND DISCUSSIONS    Patient with a history of carotid artery stenosis with a known 70% ICA lesion that was diagnosed few weeks ago and no new or worsening neurologic symptoms being currently compliant with her medications will be referred back to vascular because she does have some plaque in her right retina as diagnosed by her retinal specialist there is no emergent need for hospitalization or intervention she will be referred to both cardiology for echocardiogram and vascular for possible stent on the left    I have discussed management of the patient with the following physicians and CLAUDE's:  Dr Singh     Called our unit clerk who will a message w our patient  for both echo and vascular f/u     Escalation of care considered, and ultimately not performed:blood analysis, diagnostic imaging, and acute inpatient care management, however at this time, the  patient is most appropriate for outpatient management    The patient will return for new or worsening symptoms and is stable at the time of discharge.    The patient is referred to a primary physician for blood pressure management, diabetic screening, and for all other preventative health concerns.    DISPOSITION:  Patient will be discharged home in stable condition.    FOLLOW UP:  Bull Dennis M.D.  [address] [facility] [address]  [phone]    Schedule an appointment as soon as possible for a visit         OUTPATIENT MEDICATIONS:  New Prescriptions    No medications on file         FINAL DIAGNOSIS  1. Carotid stenosis, left    2. H/O: CVA (cerebrovascular accident)           Electronically signed by: Kaykay Cardenas M.D., 2/25/2023 3:39 PM

## 2023-02-25 NOTE — ED TRIAGE NOTES
"Chief Complaint   Patient presents with    Sent by MD     Sent by Dr Singh for echocardiogram and bilateral carotid doppler     Pt is alert and oriented, speaking in full sentences, follows commands and responds appropriately to questions.      Pt placed in lobby. Pt educated on triage process and apologized for wait time. Pt encouraged to alert staff for any changes.     Patient and staff wearing appropriate PPE      /89   Pulse 93   Temp 36.1 °C (97 °F) (Temporal)   Resp 18   Ht 1.626 m (5' 4\")   Wt 78.5 kg (173 lb)   SpO2 96%       "

## 2023-02-26 NOTE — ED NOTES
Pt discharged, all appropriate hospital equipment removed (IV, monitor, pulse ox, etc.). Pt left unit via walking to vehicle for home. Personal belongings with pt when leaving unit. Pt given discharge instructions prior to leaving unit including where to  prescriptions and when to follow-up; verbalizes understanding. Pt informed to return to ED if symptoms worsen/return or altered status develop. Copy of discharge instructions signed and turned into DC basket and copy sent with pt. F/u with PCP, consults to vascular sug and cards

## 2023-02-26 NOTE — DISCHARGE INSTRUCTIONS
IT IS IMPORTANT FOR YOUR HEALTH THAT YOU STOP SMOKING--IT IS NEVER TOO LATE TO STOP.  IDENTIFY TRIGGER SITUATIONS AND TRY TO AVOID THESE.  CONSIDER A FRIEND/FAMILY MEMBER FOR SUPPORT.  YOU  MAY ALSO TRY THE [employer] (www.cancer.org) or 1-800-QUIT-NOW FOR MORE HELP.      Continue to take your medications you will receive a phone call from our  for echocardiogram and vascular surgery follow up

## 2023-02-27 ENCOUNTER — TELEPHONE (OUTPATIENT)
Dept: CARDIOLOGY | Facility: MEDICAL CENTER | Age: 68
End: 2023-02-27

## 2023-02-27 ENCOUNTER — PATIENT OUTREACH (OUTPATIENT)
Dept: SCHEDULING | Facility: IMAGING CENTER | Age: 68
End: 2023-02-27
Payer: MEDICARE

## 2023-02-27 ENCOUNTER — OFFICE VISIT (OUTPATIENT)
Dept: CARDIOLOGY | Facility: MEDICAL CENTER | Age: 68
End: 2023-02-27
Attending: EMERGENCY MEDICINE
Payer: MEDICARE

## 2023-02-27 ENCOUNTER — TELEPHONE (OUTPATIENT)
Dept: HEALTH INFORMATION MANAGEMENT | Facility: OTHER | Age: 68
End: 2023-02-27
Payer: MEDICARE

## 2023-02-27 VITALS
WEIGHT: 178 LBS | BODY MASS INDEX: 30.39 KG/M2 | HEART RATE: 76 BPM | OXYGEN SATURATION: 95 % | SYSTOLIC BLOOD PRESSURE: 132 MMHG | HEIGHT: 64 IN | DIASTOLIC BLOOD PRESSURE: 82 MMHG | RESPIRATION RATE: 16 BRPM

## 2023-02-27 DIAGNOSIS — I63.9 CEREBROVASCULAR ACCIDENT (CVA), UNSPECIFIED MECHANISM (HCC): ICD-10-CM

## 2023-02-27 DIAGNOSIS — Z95.828 PRESENCE OF INTERNAL CAROTID STENT: ICD-10-CM

## 2023-02-27 DIAGNOSIS — F17.210 CIGARETTE SMOKER: ICD-10-CM

## 2023-02-27 DIAGNOSIS — I34.1 MITRAL VALVE PROLAPSE: ICD-10-CM

## 2023-02-27 DIAGNOSIS — E78.00 PURE HYPERCHOLESTEROLEMIA: ICD-10-CM

## 2023-02-27 PROCEDURE — 99204 OFFICE O/P NEW MOD 45 MIN: CPT | Performed by: INTERNAL MEDICINE

## 2023-02-27 RX ORDER — ROSUVASTATIN CALCIUM 40 MG/1
40 TABLET, COATED ORAL DAILY
Qty: 90 TABLET | Refills: 3 | Status: SHIPPED | OUTPATIENT
Start: 2023-02-27

## 2023-02-27 ASSESSMENT — ENCOUNTER SYMPTOMS
PALPITATIONS: 0
CHILLS: 0
HALLUCINATIONS: 0
BRUISES/BLEEDS EASILY: 0
EYE DISCHARGE: 0
HEADACHES: 0
SPEECH CHANGE: 0
PND: 0
FEVER: 0
ABDOMINAL PAIN: 0
MYALGIAS: 0
COUGH: 0
DOUBLE VISION: 0
DIZZINESS: 0
SHORTNESS OF BREATH: 0
BLOOD IN STOOL: 0
VOMITING: 0
CLAUDICATION: 0
SENSORY CHANGE: 0
DEPRESSION: 0
EYE PAIN: 0
ORTHOPNEA: 0
LOSS OF CONSCIOUSNESS: 0
NAUSEA: 0
WEIGHT LOSS: 0
FALLS: 0
BLURRED VISION: 1

## 2023-02-27 ASSESSMENT — FIBROSIS 4 INDEX: FIB4 SCORE: 1.26

## 2023-02-27 NOTE — PROGRESS NOTES
Chief Complaint   Patient presents with    Carotid Artery Stenosis     F/V Dx: Bilateral carotid artery stenosis    Hypertension    Hyperlipidemia     F/V Dx: Other hyperlipidemia        Subjective     Natividad Sewell is a 68 y.o. female who presents today for cardiac care and evaluation after recent episode of stroke for which she experienced right eye blindness.  She was seen in the hospital.  So far, there is no evidence of arrhythmias.  She does have prior history of mitral valve prolapse but has not had regular cardiac care.    I have personally interpreted EKG today with patient, there is no evidence of acute coronary syndrome, no evidence of prior infarct, normal [place] and QT interval, no significant conduction disease. Sinus rhythm.    In the interim, patient still has persistent right visual loss. Patient denies having chest pain, dyspnea, palpitation, presyncope, syncope episodes.     She is still smoking.    Past Medical History:   Diagnosis Date    Arthritis     Dental disorder     upper/lowers    High cholesterol     Hypertension     pt denies- not taking any medication currently    Migraines, neuralgic     Mitral valve prolapse     Stroke (HCC) 6/04/09    no residual weaknesses or problems     Past Surgical History:   Procedure Laterality Date    CAROTID ENDARTERECTOMY  6/6/2009    Performed by ALCIDES MERINO at SURGERY [facility] ORS    GYN SURGERY Bilateral     tubal ligation     History reviewed. No pertinent family history.  Social History     Socioeconomic History    Marital status: Single     Spouse name: Not on file    Number of children: Not on file    Years of education: Not on file    Highest education level: Not on file   Occupational History    Not on file   Tobacco Use    Smoking status: Every Day     Packs/day: 1.00     Years: 40.00     Pack years: 40.00     Types: Cigarettes     Start date: 9/16/1980    Smokeless tobacco: Never   Vaping Use    Vaping Use: Never used   Substance and  "Sexual Activity    Alcohol use: No    Drug use: Yes     Types: Inhaled     Comment: marijuana, last use 9/14/2020    Sexual activity: Not on file   Other Topics Concern    Not on file   Social History Narrative    Not on file     Social Determinants of Health     Financial Resource Strain: Not on file   Food Insecurity: Not on file   Transportation Needs: Not on file   Physical Activity: Not on file   Stress: Not on file   Social Connections: Not on file   Intimate Partner Violence: Not on file   Housing Stability: Not on file     Allergies   Allergen Reactions    Morphine      \"I don't know, it was during my recovery after the endarterectomy-they told me I had a bad reaction to it\"     Outpatient Encounter Medications as of 2/27/2023   Medication Sig Dispense Refill    rosuvastatin (CRESTOR) 40 MG tablet Take 1 Tablet by mouth every day. 90 Tablet 3    aspirin 81 MG EC tablet Take 1 Tablet by mouth every day for 90 days. 90 Tablet 0    [DISCONTINUED] atorvastatin (LIPITOR) 40 MG Tab Take 1 Tablet by mouth every evening for 90 days. 90 Tablet 0     No facility-administered encounter medications on file as of 2/27/2023.     Review of Systems   Constitutional:  Negative for chills, fever, malaise/fatigue and weight loss.   HENT:  Negative for ear discharge, ear pain, hearing loss and nosebleeds.    Eyes:  Positive for blurred vision. Negative for double vision, pain and discharge.   Respiratory:  Negative for cough and shortness of breath.    Cardiovascular:  Negative for chest pain, palpitations, orthopnea, claudication, leg swelling and PND.   Gastrointestinal:  Negative for abdominal pain, blood in stool, melena, nausea and vomiting.   Genitourinary:  Negative for dysuria and hematuria.   Musculoskeletal:  Negative for falls, joint pain and myalgias.   Skin:  Negative for itching and rash.   Neurological:  Negative for dizziness, sensory change, speech change, loss of consciousness and headaches. " "  Endo/Heme/Allergies:  Negative for environmental allergies. Does not bruise/bleed easily.   Psychiatric/Behavioral:  Negative for depression, hallucinations and suicidal ideas.             Objective     /82 (BP Location: Left arm, Patient Position: Sitting, BP Cuff Size: Adult)   Pulse 76   Resp 16   Ht 1.626 m (5' 4\")   Wt 80.7 kg (178 lb)   LMP 09/16/2009   SpO2 95%   BMI 30.55 kg/m²     Physical Exam  Vitals and nursing note reviewed.   Constitutional:       General: She is not in acute distress.     Appearance: She is not diaphoretic.   HENT:      Head: Normocephalic and atraumatic.      Right Ear: External ear normal.      Left Ear: External ear normal.      Nose: No congestion or rhinorrhea.   Eyes:      General:         Right eye: No discharge.         Left eye: No discharge.   Neck:      Thyroid: No thyromegaly.      Vascular: No JVD.   Cardiovascular:      Rate and Rhythm: Normal rate and regular rhythm.      Pulses: Normal pulses.      Heart sounds: Murmur heard.   Pulmonary:      Effort: No respiratory distress.   Abdominal:      General: There is no distension.      Tenderness: There is no abdominal tenderness.   Musculoskeletal:         General: No swelling or tenderness.      Right lower leg: No edema.      Left lower leg: No edema.   Skin:     General: Skin is warm and dry.   Neurological:      Mental Status: She is alert and oriented to person, place, and time.      Cranial Nerves: No cranial nerve deficit.      Comments: + right visual loss   Psychiatric:         Behavior: Behavior normal.              Assessment & Plan     1. Cerebrovascular accident (CVA), unspecified mechanism (HCC)  EC-ECHOCARDIOGRAM COMPLETE W/O CONT    CL-IMPLANTABLE LOOP RECORDER    rosuvastatin (CRESTOR) 40 MG tablet      2. Mitral valve prolapse  EC-ECHOCARDIOGRAM COMPLETE W/O CONT      3. Pure hypercholesterolemia  rosuvastatin (CRESTOR) 40 MG tablet    Basic Metabolic Panel    LIPID PANEL      4. Presence " of internal carotid stent        5. Cigarette smoker            Medical Decision Making: Today's Assessment/Status/Plan:   At this time, I do think that patient meets criteria for implantable loop recorder to further assess for the nature of her stroke.  In the meantime, we will continue current medications of statin and aspirin.  There is no strong evidence for anticoagulation at this time.    We will also obtain a transthoracic echocardiogram to further assess for cardiac function and valvular function.    Will see vascular surgery for carotid disease.    I spent 5 minutes talking to patient about the danger of smoking. I advised patient and counseled patient on smoking cessation. Patient has promised to achieve goal of zero cigarettes per day.    Optimize statin. Will replace Atorvastatin with Rosuvastatin 40 mg daily.

## 2023-02-28 NOTE — TELEPHONE ENCOUNTER
Holland Dumont,      Patient was informed you will be reaching out to schedule: CL-IMPLANTABLE LOOP RECORDER [[id number]]. Ordered per , Thank you.

## 2023-02-28 NOTE — TELEPHONE ENCOUNTER
NAMRATA Madrid, Med [facility]; Bertrand Ferreira M.D.  Hi Julia,     Can you please schedule her for loop recorder with me (my ADR or rounding or lab day) and Dr. Ferreira when's next available?     Thanks,   Scott Anand M.D.

## 2023-02-28 NOTE — TELEPHONE ENCOUNTER
Patient scheduled for ILR on 3-15-23 with Dr. Ferreira. Patient has been instructed to check in at 3:30 for 4:30 case time. Message sent to feliz Peralta with [facility] notified. FYI to  To

## 2023-03-01 ASSESSMENT — ENCOUNTER SYMPTOMS
SENSORY CHANGE: 0
BRUISES/BLEEDS EASILY: 1
SPEECH CHANGE: 0
CHILLS: 0
FEVER: 0
FOCAL WEAKNESS: 0
CONSTITUTIONAL NEGATIVE: 1
GASTROINTESTINAL NEGATIVE: 1
BLOOD IN STOOL: 0
DIAPHORESIS: 0
WEIGHT LOSS: 0
RESPIRATORY NEGATIVE: 1
WEAKNESS: 0
CARDIOVASCULAR NEGATIVE: 1

## 2023-03-01 NOTE — PROGRESS NOTES
Neuro Interventional Service Consultation     Re: Natividad Sewell     MRN: [id number]   : 1955    Natividad Sewell was seen today for consultation for endovascular neurointervention with Bull Dennis MD at [facility]. She was initially referred to our service by Hank Mukherjee MD.    History of Present Illness:   is known to our practice for prior right carotid stenosis after an endarterectomy. She had a stroke in 2018 and the subsequent right carotid endarterectomy at that time. Follow up imaging revealed a 60% stenosis at the site of the endarterectomy and the patient was referred for evaluation and treatment of the stenosis with a carotid artery stent. Dr. Dennis performed uncomplicated stent placement on 2020. At the time of her right COLTON, the left carotid artery was noted to have 40% stenosis on conventional angiography. Of note was a chronically occluded right common iliac artery, ultimately requiring left femoral procedure access. She had an admission for right eye upper visual field cut on 2023 and was noted to have a left carotid stenosis of 70% on CTA. Upon review of imaging, it is the origin of the left common carotid artery, which is similar in appearance to the CTA obtained in .     She is seen today for evaluation for stent placement for left carotid artery stenosis. Today, the patient feels at baseline. Her vision is still gone in the right upper eye. She specifically denies any complaints of right arm or leg weakness/ paresthesia, right hand clumsiness, or right facial droop. She is tolerated the aspirin and Plavix without problems for her last stent procedure. She is a current smoker. She has ongoing follow ups with Dr. Mukherjee. She lives in [place] and is unaccompanied to today's consultation.    Past Medical History:   Diagnosis Date    Arthritis     Dental disorder     upper/lowers    High cholesterol     Hypertension     pt denies- not taking any  medication currently    Migraines, neuralgic     Mitral valve prolapse     Stroke (HCC) 6/04/09    no residual weaknesses or problems     Past Surgical History:   Procedure Laterality Date    CAROTID ENDARTERECTOMY  6/6/2009    Performed by ALCIDES MERINO at SURGERY [facility] ORS    GYN SURGERY Bilateral     tubal ligation     Social History     Socioeconomic History    Marital status: Single     Spouse name: Not on file    Number of children: Not on file    Years of education: Not on file    Highest education level: Not on file   Occupational History    Not on file   Tobacco Use    Smoking status: Every Day     Packs/day: 1.00     Years: 40.00     Pack years: 40.00     Types: Cigarettes     Start date: 9/16/1980    Smokeless tobacco: Never   Vaping Use    Vaping Use: Never used   Substance and Sexual Activity    Alcohol use: No    Drug use: Yes     Types: Inhaled     Comment: marijuana, last use 9/14/2020    Sexual activity: Not on file   Other Topics Concern    Not on file   Social History Narrative    Not on file     Social Determinants of Health     Financial Resource Strain: Not on file   Food Insecurity: Not on file   Transportation Needs: Not on file   Physical Activity: Not on file   Stress: Not on file   Social Connections: Not on file   Intimate Partner Violence: Not on file   Housing Stability: Not on file     No family history on file.    Review of Systems   Constitutional: Negative.  Negative for chills, diaphoresis, fever, malaise/fatigue and weight loss.   Eyes:         Positive for right eye upper vision loss   Respiratory: Negative.     Cardiovascular: Negative.    Gastrointestinal: Negative.  Negative for blood in stool and melena.   Musculoskeletal:  Positive for joint pain.   Skin: Negative.    Neurological:  Negative for sensory change, speech change, focal weakness and weakness.   Endo/Heme/Allergies:  Bruises/bleeds easily.   Psychiatric/Behavioral:  Positive for substance abuse (tobacco).       A comprehensive 14-point review of systems was negative except as described above.     Labs:    Latest Reference Range & Units 02/14/23 00:23 02/14/23 00:41 02/14/23 04:28   WBC 4.8 - 10.8 K/uL 7.1  6.5   RBC 4.20 - 5.40 M/uL 5.32  5.01   Hemoglobin 12.0 - 16.0 g/dL 15.4  14.5   Hematocrit 37.0 - 47.0 % 46.4  43.6   MCV 81.4 - 97.8 fL 87.2  87.0   MCH 27.0 - 33.0 pg 28.9  28.9   MCHC 33.6 - 35.0 g/dL 33.2 (L)  33.3 (L)   RDW 35.9 - 50.0 fL 43.1  43.6   Platelet Count 164 - 446 K/uL 259  238   MPV 9.0 - 12.9 fL 9.2  9.5   Neutrophils-Polys 44.00 - 72.00 % 57.90  53.30   Neutrophils (Absolute) 2.00 - 7.15 K/uL 4.09  3.46   Lymphocytes 22.00 - 41.00 % 33.60  37.10   Lymphs (Absolute) 1.00 - 4.80 K/uL 2.38  2.41   Monocytes 0.00 - 13.40 % 6.60  7.60   Monos (Absolute) 0.00 - 0.85 K/uL 0.47  0.49   Eosinophils 0.00 - 6.90 % 1.10  1.20   Eos (Absolute) 0.00 - 0.51 K/uL 0.08  0.08   Basophils 0.00 - 1.80 % 0.40  0.50   Baso (Absolute) 0.00 - 0.12 K/uL 0.03  0.03   Immature Granulocytes 0.00 - 0.90 % 0.40  0.30   Immature Granulocytes (abs) 0.00 - 0.11 K/uL 0.03  0.02   Nucleated RBC /100 WBC 0.00  0.00   NRBC (Absolute) K/uL 0.00  0.00   Sodium 135 - 145 mmol/L 142  137   Potassium 3.6 - 5.5 mmol/L 4.0  4.1   Chloride 96 - 112 mmol/L 106  103   Co2 20 - 33 mmol/L 23  22   Anion Gap 7.0 - 16.0  13.0     Glucose 65 - 99 mg/dL 123 (H)  98   Bun 8 - 22 mg/dL 20  18   Creatinine 0.50 - 1.40 mg/dL 0.58  0.54   GFR (CKD-EPI) >60 mL/min/1.73 m 2 99  101   Calcium 8.5 - 10.5 mg/dL 9.6  9.1   Correct Calcium 8.5 - 10.5 mg/dL 9.3  9.3   AST(SGOT) 12 - 45 U/L 19     ALT(SGPT) 2 - 50 U/L 18     Alkaline Phosphatase 30 - 99 U/L 90     Total Bilirubin 0.1 - 1.5 mg/dL 0.2     Albumin 3.2 - 4.9 g/dL 4.4  3.8   Total Protein 6.0 - 8.2 g/dL 7.5     Globulin 1.9 - 3.5 g/dL 3.1     A-G Ratio g/dL 1.4     Phosphorus 2.5 - 4.5 mg/dL   4.2   Magnesium 1.5 - 2.5 mg/dL   2.0   Glycohemoglobin 4.0 - 5.6 %   6.1 (H)   Estim. Avg Glu mg/dL   128    Cholesterol,Tot 100 - 199 mg/dL   225 (H)   Triglycerides 0 - 149 mg/dL   133   HDL >=40 mg/dL   53   LDL <100 mg/dL   145 (H)   Troponin T 6 - 19 ng/L 16     POC Glucose, Blood 65 - 99 mg/dL  120 (H)    INR 0.87 - 1.13  1.01     PT 12.0 - 14.6 sec 13.2     APTT 24.7 - 36.0 sec 29.0     (L): Data is abnormally low  (H): Data is abnormally high      Radiology:   MRI brain 2/15/23 at [facility]:  Small focus of hyperintensity with T2 signal abnormality in the right paramedian davey is suspicious for a subacute infarct.      Encephalomalacia from remote right parietal infarct.      CTA head 2/14/23 at [facility]:  1.  Distal right M3 branch of nonopacification leading to area of encephalomalacia, likely chronic occlusion related to prior infarction and stable since prior study.  2.  Otherwise no large vessel occlusion or aneurysm identified    CTA neck 2/14/23 at [facility]:  1.  Dense calcification of the origin the left common carotid artery resulting in greater than 70% stenosis.      CTP 2/14/23 at [facility]:  1.  Cerebral blood flow less than 30% likely representing completed infarct = 0 mL.  2.  T Max more than 6 seconds likely representing combination of completed infarct and ischemia = 0 mL.  3.  Mismatched volume likely representing ischemic brain/penumbra = None  4.  Please note that the cerebral perfusion was performed on the limited brain tissue around the basal ganglia region. Infarct/ischemia outside the CT perfusion sections can be missed in this study.    CT head w/o 2/14/23 at [facility]:  1.  No acute intracranial abnormality.  2.  Atherosclerosis.    Neurointerventional procedure 9/16/20 at [facility]:  1.  Recurrent right carotid stenosis.  2.  Successful right carotid angioplasty and stent placement with embolic protection device.  3.  Ulcerative atherosclerotic plaque at the left internal carotid artery causing approximately 40% stenosis.                CTA neck 7/10/20 at [facility]:  1.  There is atherosclerosis  "throughout both common carotid arteries and internal carotid artery origins with estimated 75% stenosis of the distal right common carotid artery just proximal to the bulb.  2.  There is estimated 50% stenosis of the right ICA origin and 50-69% stenosis of the left ICA origin.    Current Outpatient Medications   Medication Sig Dispense Refill    rosuvastatin (CRESTOR) 40 MG tablet Take 1 Tablet by mouth every day. 90 Tablet 3    aspirin 81 MG EC tablet Take 1 Tablet by mouth every day for 90 days. 90 Tablet 0     No current facility-administered medications for this visit.       Allergies   Allergen Reactions    Morphine      \"I don't know, it was during my recovery after the endarterectomy-they told me I had a bad reaction to it\"       Physical Exam  Constitutional:       General: She is not in acute distress.     Appearance: She is not diaphoretic.   Eyes:      General: No scleral icterus.  Pulmonary:      Effort: Pulmonary effort is normal. No respiratory distress.   Abdominal:      General: There is no distension.   Skin:     General: Skin is warm and dry.      Coloration: Skin is not pale.      Findings: No erythema or rash.   Neurological:      Mental Status: She is alert and oriented to person, place, and time. She is not disoriented.      Cranial Nerves: No facial asymmetry.      Sensory: Sensation is intact.      Motor: No weakness or tremor.      Coordination: Coordination normal.      Gait: Gait abnormal (ambulates with cane for short distances. In wheelchair to exam room.).      Comments: Ambulates with cane   Psychiatric:         Mood and Affect: Mood and affect normal.         Behavior: Behavior normal.         Thought Content: Thought content normal.         Cognition and Memory: Memory normal.         Judgment: Judgment normal.       Impression:   1. Asymptomatic left carotid artery origin stenosis 70%.  2. Right carotid artery status post stent placement.   3. Hyperlipidemia.  4. Essential " hypertension.  5. Chronically occluded right common iliac artery.  6. Tobacco dependence.    Plan:   Bull eDnnis MD additionally evaluated the patient today, including history and imaging studies, examined the patient, and discussed treatment options. Ms. Sewell has a severe stenosis at the left common carotid artery origin of about 70%. Retrospective review of imaging shows it is largely stable from 2020, however she is concerned about having a large stroke due to this finding. We discussed medical management and waiting until symptoms develop, however she again states she has already had a stroke and now has permanent right eye vision loss and does not wish to have additional deficits. We discussed a diagnostic angiogram and if the stenosis is also significant by angiographic measurements we can place a stent. We additionally discussed the procedure risks, including bleeding and infection, damage to the arteries, reaction to any medications given during the procedure, side effects of contrast, radiation exposure, in stent stenosis, coil or stent migration, mechanical failure, stroke, hemorrhage, and death. There is a risk for unanticipated neurologic or non neurologic complications. There is a chance the stenosis may ultimately not be amenable to endovascular intervention. After the procedure, there is a chance that the stenosis could recur. We explained that the patient will need to have ongoing surveillance imaging after the procedure, which will be managed by us or her vascular surgeon, and that future treatment depends on multiple factors including lab studies, imaging, and performance status. We discussed alternatives to the procedure including surveillance and surgical intervention, which could be discussed with her surgeon. We explained that this location is amenable to endovascular means but open surgery would be more challenging. The patient verbalizes understanding of risks, benefits, and  alternatives and elects to proceed. We discussed the need for aspirin and Plavix prior to the procedure and for up to 6 months post procedure if a stent is placed. Side effects of antiplatelet therapy, specifically bleeding, were discussed with instructions given should the patient develop minor or major bleeding. We discussed signs of a stroke with instructions to call an ambulance for the onset of these symptoms. The patient has been scheduled for March 27.    Natividad Sewell is a 68 year old female scheduled for left common carotid artery stent placement. This surgery will be higher risk surgery due to risk of intracranial hemorrhage or thrombotic events. Care provided will be in the Intensive Care Unit. Post procedurally, this patient will require every 1 hour nursing interventions and physician evaluations more than 3-4 times a day. The care needed cannot be provided in outpatient setting and without such care there is high risk of development of complications and death.     ROMI Reilly with Bull Dennis MD  Neuro Interventional Service   [place] [id number] [facility] ([id number])  [place] [id number]  [phone]

## 2023-03-02 ENCOUNTER — HOSPITAL ENCOUNTER (OUTPATIENT)
Dept: RADIOLOGY | Facility: MEDICAL CENTER | Age: 68
End: 2023-03-02
Attending: EMERGENCY MEDICINE
Payer: MEDICARE

## 2023-03-02 DIAGNOSIS — I65.22 CAROTID STENOSIS, LEFT: ICD-10-CM

## 2023-03-02 RX ORDER — CLOPIDOGREL BISULFATE 75 MG/1
75 TABLET ORAL DAILY
Qty: 30 TABLET | Refills: 1 | Status: SHIPPED | OUTPATIENT
Start: 2023-03-02 | End: 2023-03-20 | Stop reason: SDUPTHER

## 2023-03-02 ASSESSMENT — LIFESTYLE VARIABLES: SUBSTANCE_ABUSE: 1

## 2023-03-14 ENCOUNTER — PRE-ADMISSION TESTING (OUTPATIENT)
Dept: ADMISSIONS | Facility: MEDICAL CENTER | Age: 68
End: 2023-03-14
Attending: INTERNAL MEDICINE
Payer: MEDICARE

## 2023-03-15 ENCOUNTER — APPOINTMENT (OUTPATIENT)
Dept: ADMISSIONS | Facility: MEDICAL CENTER | Age: 68
DRG: 034 | End: 2023-03-15
Payer: MEDICARE

## 2023-03-15 ENCOUNTER — HOSPITAL ENCOUNTER (OUTPATIENT)
Facility: MEDICAL CENTER | Age: 68
End: 2023-03-15
Attending: INTERNAL MEDICINE | Admitting: INTERNAL MEDICINE
Payer: MEDICARE

## 2023-03-15 ENCOUNTER — APPOINTMENT (OUTPATIENT)
Dept: CARDIOLOGY | Facility: MEDICAL CENTER | Age: 68
End: 2023-03-15
Attending: INTERNAL MEDICINE
Payer: MEDICARE

## 2023-03-15 VITALS
TEMPERATURE: 97.3 F | SYSTOLIC BLOOD PRESSURE: 155 MMHG | HEIGHT: 64 IN | OXYGEN SATURATION: 99 % | HEART RATE: 68 BPM | DIASTOLIC BLOOD PRESSURE: 68 MMHG | BODY MASS INDEX: 32.18 KG/M2 | WEIGHT: 188.49 LBS | RESPIRATION RATE: 18 BRPM

## 2023-03-15 DIAGNOSIS — I63.9 CEREBROVASCULAR ACCIDENT (CVA), UNSPECIFIED MECHANISM (HCC): ICD-10-CM

## 2023-03-15 PROCEDURE — 33285 INSJ SUBQ CAR RHYTHM MNTR: CPT | Performed by: INTERNAL MEDICINE

## 2023-03-15 PROCEDURE — 160046 HCHG PACU - 1ST 60 MINS PHASE II

## 2023-03-15 PROCEDURE — 33285 INSJ SUBQ CAR RHYTHM MNTR: CPT

## 2023-03-15 PROCEDURE — 160002 HCHG RECOVERY MINUTES (STAT)

## 2023-03-15 PROCEDURE — 700101 HCHG RX REV CODE 250

## 2023-03-15 RX ORDER — SODIUM CHLORIDE 9 MG/ML
1000 INJECTION, SOLUTION INTRAVENOUS CONTINUOUS
Status: CANCELLED | OUTPATIENT
Start: 2023-03-15

## 2023-03-15 RX ORDER — LIDOCAINE HYDROCHLORIDE AND EPINEPHRINE 10; 10 MG/ML; UG/ML
10 INJECTION, SOLUTION INFILTRATION; PERINEURAL ONCE
Status: COMPLETED | OUTPATIENT
Start: 2023-03-15 | End: 2023-03-15

## 2023-03-15 RX ORDER — LIDOCAINE HYDROCHLORIDE AND EPINEPHRINE 10; 10 MG/ML; UG/ML
INJECTION, SOLUTION INFILTRATION; PERINEURAL
Status: COMPLETED
Start: 2023-03-15 | End: 2023-03-15

## 2023-03-15 RX ADMIN — LIDOCAINE HYDROCHLORIDE AND EPINEPHRINE 10 ML: 10; 10 INJECTION, SOLUTION INFILTRATION; PERINEURAL at 16:34

## 2023-03-15 ASSESSMENT — FIBROSIS 4 INDEX: FIB4 SCORE: 1.26

## 2023-03-15 NOTE — DISCHARGE INSTRUCTIONS
Implantable Loop Recorder Placement, Care After  This sheet gives you information about how to care for yourself after your procedure. Your health care provider may also give you more specific instructions. If you have problems or questions, contact your health care provider.  What can I expect after the procedure?  After the procedure, it is common to have:  Soreness or discomfort near the incision.  Some swelling or bruising near the incision.  Follow these instructions at home:  Incision care  Follow instructions from your health care provider about how to take care of your incision. Make sure you:  Wash your hands with soap and water before you change your bandage (dressing). If soap and water are not available, use hand .  Change your dressing as told by your health care provider.  Keep your dressing dry.  Leave stitches (sutures), skin glue, or adhesive strips in place. These skin closures may need to stay in place for 2 weeks or longer. If adhesive strip edges start to loosen and curl up, you may trim the loose edges. Do not remove adhesive strips completely unless your health care provider tells you to do that.  Check your incision area every day for signs of infection. Check for:  Redness, swelling, or pain.  Fluid or blood.  Warmth.  Pus or a bad smell.  Do not take baths, swim, or use a hot tub until your health care provider approves. Ask your health care provider if you can take showers.  ActivitY  Return to your normal activities as told by your health care provider. Ask your health care provider what activities are safe for you.  Do not drive for 24 hours if you were given a sedative during your procedure.  General instructions  Follow instructions from your health care provider about how to manage your implantable loop recorder and transmit the information. Learn how to activate a recording if this is necessary for your type of device.  Do not go through a metal detection gate, and do not  let someone hold a metal detector over your chest. Show your ID card.  Do not have an MRI unless you check with your health care provider first.  Take over-the-counter and prescription medicines only as told by your health care provider.  Keep all follow-up visits as told by your health care provider. This is important.  Contact a health care provider if:  You have redness, swelling, or pain around your incision.  You have a fever.  You have pain that is not relieved by your pain medicine.  You have triggered your device because of fainting (syncope) or because of a heartbeat that feels like it is racing, slow, fluttering, or skipping (palpitations).  Get help right away if you have:  Chest pain.  Difficulty breathing.  Summary  After the procedure, it is common to have soreness or discomfort near the incision.  Change your dressing as told by your health care provider.  Follow instructions from your health care provider about how to manage your implantable loop recorder and transmit the information.  Keep all follow-up visits as told by your health care provider. This is important.  This information is not intended to replace advice given to you by your health care provider. Make sure you discuss any questions you have with your health care provider.  Document Released: 11/28/2016 Document Revised: 02/02/2019 Document Reviewed: 02/02/2019  [employer] Patient Education © 2020 [employer]    Diet    Resume your normal diet as tolerated.  A diet low in cholesterol, fat, and sodium is recommended for good health.     Activity    Resume Your Normal Activity    You may resume your normal activity as tolerated.  Rest as needed.

## 2023-03-16 ENCOUNTER — PRE-ADMISSION TESTING (OUTPATIENT)
Dept: ADMISSIONS | Facility: MEDICAL CENTER | Age: 68
DRG: 034 | End: 2023-03-16
Attending: RADIOLOGY
Payer: MEDICARE

## 2023-03-16 NOTE — OP REPORT
PROCEDURE PERFORMED: Implantable Loop Recorder    DATE OF SERVICE: 3/15/2023    : Bertrand Ferreira MD    ASSISTANT: Dr Jimmy Anand    ANESTHESIA: Local    EBL: <5 cc    SPECIMENS: None    STATEMENT OF MEDICAL NECESSITY:  Cryptogenic CVA    DESCRIPTION OF PROCEDURE:  After informed written consent, the patient was brought to the cath lab pre/post procedure area. The patient was prepped and draped in the usual sterile fashion. The procedure was performed with local anesthetic. Using the supplied incision tool, a <1 cm incision was made in the skin about 2 cm leftward and lateral to the sternal border. Using the supplied insertion tool, a tunnel was made in the subcutaneous tissue at a 45 degree angle to the sternum and the device was inserted with the supplied plunger. Manual compression was used until hemostasis achieved. A single 4-0 vicryl suture was used to appose the skin. Dermabond was applied to the incision site. Sterile dressing was applied.    IMPLANTED DEVICE INFORMATION:  Model: Sayduck M301  Serial number:  [id number]    IMPRESSIONS:  1. Successful implantable loop recorder implantation    RECOMMENDATIONS:  1. Routine follow-up and device interrogation

## 2023-03-20 RX ORDER — CLOPIDOGREL BISULFATE 75 MG/1
75 TABLET ORAL DAILY
Qty: 30 TABLET | Refills: 1 | Status: SHIPPED | OUTPATIENT
Start: 2023-03-20 | End: 2023-05-19

## 2023-03-21 ENCOUNTER — PRE-ADMISSION TESTING (OUTPATIENT)
Dept: ADMISSIONS | Facility: MEDICAL CENTER | Age: 68
DRG: 034 | End: 2023-03-21
Attending: RADIOLOGY
Payer: MEDICARE

## 2023-03-21 DIAGNOSIS — Z01.812 PRE-OPERATIVE LABORATORY EXAMINATION: ICD-10-CM

## 2023-03-21 LAB
CREAT SERPL-MCNC: 0.62 MG/DL (ref 0.5–1.4)
ERYTHROCYTE [DISTWIDTH] IN BLOOD BY AUTOMATED COUNT: 44.4 FL (ref 35.9–50)
GFR SERPLBLD CREATININE-BSD FMLA CKD-EPI: 97 ML/MIN/1.73 M 2
HCT VFR BLD AUTO: 46.8 % (ref 37–47)
HGB BLD-MCNC: 15 G/DL (ref 12–16)
INR PPP: 0.95 (ref 0.87–1.13)
MCH RBC QN AUTO: 28.4 PG (ref 27–33)
MCHC RBC AUTO-ENTMCNC: 32.1 G/DL (ref 33.6–35)
MCV RBC AUTO: 88.5 FL (ref 81.4–97.8)
PLATELET # BLD AUTO: 243 K/UL (ref 164–446)
PMV BLD AUTO: 9.5 FL (ref 9–12.9)
PROTHROMBIN TIME: 12.6 SEC (ref 12–14.6)
RBC # BLD AUTO: 5.29 M/UL (ref 4.2–5.4)
WBC # BLD AUTO: 5.4 K/UL (ref 4.8–10.8)

## 2023-03-21 PROCEDURE — 36415 COLL VENOUS BLD VENIPUNCTURE: CPT

## 2023-03-21 PROCEDURE — 82565 ASSAY OF CREATININE: CPT

## 2023-03-21 PROCEDURE — 85610 PROTHROMBIN TIME: CPT

## 2023-03-21 PROCEDURE — 85027 COMPLETE CBC AUTOMATED: CPT

## 2023-03-27 ENCOUNTER — HOSPITAL ENCOUNTER (INPATIENT)
Facility: MEDICAL CENTER | Age: 68
LOS: 2 days | DRG: 034 | End: 2023-03-29
Attending: RADIOLOGY | Admitting: STUDENT IN AN ORGANIZED HEALTH CARE EDUCATION/TRAINING PROGRAM
Payer: MEDICARE

## 2023-03-27 ENCOUNTER — APPOINTMENT (OUTPATIENT)
Dept: RADIOLOGY | Facility: MEDICAL CENTER | Age: 68
DRG: 034 | End: 2023-03-27
Attending: RADIOLOGY
Payer: MEDICARE

## 2023-03-27 DIAGNOSIS — I65.22 LEFT CAROTID STENOSIS: ICD-10-CM

## 2023-03-27 DIAGNOSIS — J96.01 ACUTE RESPIRATORY FAILURE WITH HYPOXIA (HCC): ICD-10-CM

## 2023-03-27 DIAGNOSIS — Z72.0 TOBACCO USE: ICD-10-CM

## 2023-03-27 PROBLEM — I63.511 CEREBROVASCULAR ACCIDENT (CVA) DUE TO OCCLUSION OF RIGHT MIDDLE CEREBRAL ARTERY (HCC): Status: ACTIVE | Noted: 2023-03-27

## 2023-03-27 LAB — ACT BLD: 227 SEC (ref 74–137)

## 2023-03-27 PROCEDURE — 76937 US GUIDE VASCULAR ACCESS: CPT

## 2023-03-27 PROCEDURE — 700117 HCHG RX CONTRAST REV CODE 255: Performed by: RADIOLOGY

## 2023-03-27 PROCEDURE — 37218 STENT PLACEMT ANTE CAROTID: CPT | Mod: LT

## 2023-03-27 PROCEDURE — 85347 COAGULATION TIME ACTIVATED: CPT

## 2023-03-27 PROCEDURE — 99222 1ST HOSP IP/OBS MODERATE 55: CPT | Performed by: STUDENT IN AN ORGANIZED HEALTH CARE EDUCATION/TRAINING PROGRAM

## 2023-03-27 PROCEDURE — 99153 MOD SED SAME PHYS/QHP EA: CPT

## 2023-03-27 PROCEDURE — 700111 HCHG RX REV CODE 636 W/ 250 OVERRIDE (IP): Performed by: STUDENT IN AN ORGANIZED HEALTH CARE EDUCATION/TRAINING PROGRAM

## 2023-03-27 PROCEDURE — 160036 HCHG PACU - EA ADDL 30 MINS PHASE I

## 2023-03-27 PROCEDURE — 700102 HCHG RX REV CODE 250 W/ 637 OVERRIDE(OP): Performed by: STUDENT IN AN ORGANIZED HEALTH CARE EDUCATION/TRAINING PROGRAM

## 2023-03-27 PROCEDURE — B3151ZZ FLUOROSCOPY OF BILATERAL COMMON CAROTID ARTERIES USING LOW OSMOLAR CONTRAST: ICD-10-PCS | Performed by: RADIOLOGY

## 2023-03-27 PROCEDURE — 700102 HCHG RX REV CODE 250 W/ 637 OVERRIDE(OP): Performed by: RADIOLOGY

## 2023-03-27 PROCEDURE — 160002 HCHG RECOVERY MINUTES (STAT)

## 2023-03-27 PROCEDURE — 770022 HCHG ROOM/CARE - ICU (200)

## 2023-03-27 PROCEDURE — 700111 HCHG RX REV CODE 636 W/ 250 OVERRIDE (IP)

## 2023-03-27 PROCEDURE — 96374 THER/PROPH/DIAG INJ IV PUSH: CPT

## 2023-03-27 PROCEDURE — 306637 HCHG MISC ORTHO ITEM RC 0274

## 2023-03-27 PROCEDURE — 700101 HCHG RX REV CODE 250: Performed by: STUDENT IN AN ORGANIZED HEALTH CARE EDUCATION/TRAINING PROGRAM

## 2023-03-27 PROCEDURE — A9270 NON-COVERED ITEM OR SERVICE: HCPCS | Performed by: STUDENT IN AN ORGANIZED HEALTH CARE EDUCATION/TRAINING PROGRAM

## 2023-03-27 PROCEDURE — A9270 NON-COVERED ITEM OR SERVICE: HCPCS | Performed by: RADIOLOGY

## 2023-03-27 PROCEDURE — 160035 HCHG PACU - 1ST 60 MINS PHASE I

## 2023-03-27 PROCEDURE — 037J3DZ DILATION OF LEFT COMMON CAROTID ARTERY WITH INTRALUMINAL DEVICE, PERCUTANEOUS APPROACH: ICD-10-PCS | Performed by: RADIOLOGY

## 2023-03-27 PROCEDURE — 700111 HCHG RX REV CODE 636 W/ 250 OVERRIDE (IP): Performed by: RADIOLOGY

## 2023-03-27 PROCEDURE — 37216 TRANSCATH STENT CCA W/O EPS: CPT | Mod: LT

## 2023-03-27 PROCEDURE — 37218 STENT PLACEMT ANTE CAROTID: CPT

## 2023-03-27 RX ORDER — CLOPIDOGREL BISULFATE 75 MG/1
75 TABLET ORAL DAILY
Status: DISCONTINUED | OUTPATIENT
Start: 2023-03-27 | End: 2023-03-27

## 2023-03-27 RX ORDER — CLOPIDOGREL BISULFATE 75 MG/1
75 TABLET ORAL DAILY
Status: DISCONTINUED | OUTPATIENT
Start: 2023-03-28 | End: 2023-03-29 | Stop reason: HOSPADM

## 2023-03-27 RX ORDER — ONDANSETRON 2 MG/ML
4 INJECTION INTRAMUSCULAR; INTRAVENOUS EVERY 4 HOURS PRN
Status: DISCONTINUED | OUTPATIENT
Start: 2023-03-27 | End: 2023-03-29 | Stop reason: HOSPADM

## 2023-03-27 RX ORDER — MIDAZOLAM HYDROCHLORIDE 1 MG/ML
INJECTION INTRAMUSCULAR; INTRAVENOUS
Status: COMPLETED
Start: 2023-03-27 | End: 2023-03-27

## 2023-03-27 RX ORDER — HYDRALAZINE HYDROCHLORIDE 20 MG/ML
10 INJECTION INTRAMUSCULAR; INTRAVENOUS EVERY 4 HOURS PRN
Status: DISCONTINUED | OUTPATIENT
Start: 2023-03-27 | End: 2023-03-29 | Stop reason: HOSPADM

## 2023-03-27 RX ORDER — HEPARIN SODIUM 1000 [USP'U]/ML
INJECTION, SOLUTION INTRAVENOUS; SUBCUTANEOUS
Status: COMPLETED
Start: 2023-03-27 | End: 2023-03-27

## 2023-03-27 RX ORDER — MIDAZOLAM HYDROCHLORIDE 1 MG/ML
INJECTION INTRAMUSCULAR; INTRAVENOUS
Status: DISPENSED
Start: 2023-03-27 | End: 2023-03-27

## 2023-03-27 RX ORDER — PHENYLEPHRINE HCL IN 0.9% NACL 0.5 MG/5ML
SYRINGE (ML) INTRAVENOUS
Status: DISPENSED
Start: 2023-03-27 | End: 2023-03-27

## 2023-03-27 RX ORDER — ASPIRIN 81 MG/1
81 TABLET, CHEWABLE ORAL DAILY
Status: DISCONTINUED | OUTPATIENT
Start: 2023-03-28 | End: 2023-03-29 | Stop reason: HOSPADM

## 2023-03-27 RX ORDER — AMOXICILLIN 250 MG
2 CAPSULE ORAL 2 TIMES DAILY
Status: DISCONTINUED | OUTPATIENT
Start: 2023-03-27 | End: 2023-03-29 | Stop reason: HOSPADM

## 2023-03-27 RX ORDER — SODIUM CHLORIDE 9 MG/ML
500 INJECTION, SOLUTION INTRAVENOUS
Status: DISCONTINUED | OUTPATIENT
Start: 2023-03-27 | End: 2023-03-27 | Stop reason: HOSPADM

## 2023-03-27 RX ORDER — LABETALOL HYDROCHLORIDE 5 MG/ML
10 INJECTION, SOLUTION INTRAVENOUS EVERY 4 HOURS PRN
Status: DISCONTINUED | OUTPATIENT
Start: 2023-03-27 | End: 2023-03-29 | Stop reason: HOSPADM

## 2023-03-27 RX ORDER — ONDANSETRON 4 MG/1
4 TABLET, ORALLY DISINTEGRATING ORAL EVERY 4 HOURS PRN
Status: DISCONTINUED | OUTPATIENT
Start: 2023-03-27 | End: 2023-03-29 | Stop reason: HOSPADM

## 2023-03-27 RX ORDER — POLYETHYLENE GLYCOL 3350 17 G/17G
1 POWDER, FOR SOLUTION ORAL
Status: DISCONTINUED | OUTPATIENT
Start: 2023-03-27 | End: 2023-03-29 | Stop reason: HOSPADM

## 2023-03-27 RX ORDER — BISACODYL 10 MG
10 SUPPOSITORY, RECTAL RECTAL
Status: DISCONTINUED | OUTPATIENT
Start: 2023-03-27 | End: 2023-03-29 | Stop reason: HOSPADM

## 2023-03-27 RX ORDER — ONDANSETRON 2 MG/ML
4 INJECTION INTRAMUSCULAR; INTRAVENOUS PRN
Status: DISCONTINUED | OUTPATIENT
Start: 2023-03-27 | End: 2023-03-27 | Stop reason: HOSPADM

## 2023-03-27 RX ORDER — MIDAZOLAM HYDROCHLORIDE 1 MG/ML
.5-2 INJECTION INTRAMUSCULAR; INTRAVENOUS PRN
Status: DISCONTINUED | OUTPATIENT
Start: 2023-03-27 | End: 2023-03-27 | Stop reason: HOSPADM

## 2023-03-27 RX ORDER — ACETAMINOPHEN 325 MG/1
650 TABLET ORAL EVERY 6 HOURS PRN
Status: DISCONTINUED | OUTPATIENT
Start: 2023-03-27 | End: 2023-03-29 | Stop reason: HOSPADM

## 2023-03-27 RX ORDER — ASPIRIN 325 MG
325 TABLET ORAL ONCE
Status: COMPLETED | OUTPATIENT
Start: 2023-03-27 | End: 2023-03-27

## 2023-03-27 RX ADMIN — ACETAMINOPHEN 650 MG: 325 TABLET, FILM COATED ORAL at 15:59

## 2023-03-27 RX ADMIN — FENTANYL CITRATE 50 MCG: 50 INJECTION, SOLUTION INTRAMUSCULAR; INTRAVENOUS at 10:49

## 2023-03-27 RX ADMIN — HYDRALAZINE HYDROCHLORIDE 10 MG: 20 INJECTION INTRAMUSCULAR; INTRAVENOUS at 16:35

## 2023-03-27 RX ADMIN — FENTANYL CITRATE 50 MCG: 50 INJECTION, SOLUTION INTRAMUSCULAR; INTRAVENOUS at 10:00

## 2023-03-27 RX ADMIN — IOHEXOL 75 ML: 300 INJECTION, SOLUTION INTRAVENOUS at 12:30

## 2023-03-27 RX ADMIN — HEPARIN SODIUM 5000 UNITS: 1000 INJECTION, SOLUTION INTRAVENOUS; SUBCUTANEOUS at 10:53

## 2023-03-27 RX ADMIN — ONDANSETRON 4 MG: 2 INJECTION INTRAMUSCULAR; INTRAVENOUS at 17:18

## 2023-03-27 RX ADMIN — LABETALOL HYDROCHLORIDE 10 MG: 5 INJECTION INTRAVENOUS at 14:22

## 2023-03-27 RX ADMIN — MIDAZOLAM HYDROCHLORIDE 1 MG: 1 INJECTION, SOLUTION INTRAMUSCULAR; INTRAVENOUS at 10:50

## 2023-03-27 RX ADMIN — MIDAZOLAM HYDROCHLORIDE 1 MG: 1 INJECTION INTRAMUSCULAR; INTRAVENOUS at 10:50

## 2023-03-27 RX ADMIN — ASPIRIN 325 MG: 325 TABLET ORAL at 09:40

## 2023-03-27 ASSESSMENT — LIFESTYLE VARIABLES
TOTAL SCORE: 0
EVER FELT BAD OR GUILTY ABOUT YOUR DRINKING: NO
EVER HAD A DRINK FIRST THING IN THE MORNING TO STEADY YOUR NERVES TO GET RID OF A HANGOVER: NO
HAVE PEOPLE ANNOYED YOU BY CRITICIZING YOUR DRINKING: NO
AVERAGE NUMBER OF DAYS PER WEEK YOU HAVE A DRINK CONTAINING ALCOHOL: 0
CONSUMPTION TOTAL: NEGATIVE
HOW MANY TIMES IN THE PAST YEAR HAVE YOU HAD 5 OR MORE DRINKS IN A DAY: 0
ALCOHOL_USE: NO
TOTAL SCORE: 0
TOTAL SCORE: 0
ON A TYPICAL DAY WHEN YOU DRINK ALCOHOL HOW MANY DRINKS DO YOU HAVE: 0
HAVE YOU EVER FELT YOU SHOULD CUT DOWN ON YOUR DRINKING: NO
DOES PATIENT WANT TO STOP DRINKING: NO

## 2023-03-27 ASSESSMENT — PAIN DESCRIPTION - PAIN TYPE
TYPE: CHRONIC PAIN
TYPE: ACUTE PAIN;CHRONIC PAIN;SURGICAL PAIN
TYPE: CHRONIC PAIN

## 2023-03-27 ASSESSMENT — ENCOUNTER SYMPTOMS
CHILLS: 0
SHORTNESS OF BREATH: 0
FOCAL WEAKNESS: 0
PALPITATIONS: 0
HEADACHES: 0
SPUTUM PRODUCTION: 0
ABDOMINAL PAIN: 0
MUSCULOSKELETAL NEGATIVE: 1
EYES NEGATIVE: 1
NAUSEA: 0
FEVER: 0
VOMITING: 0
SORE THROAT: 0

## 2023-03-27 ASSESSMENT — COGNITIVE AND FUNCTIONAL STATUS - GENERAL
WALKING IN HOSPITAL ROOM: A LITTLE
MOBILITY SCORE: 19
MOVING FROM LYING ON BACK TO SITTING ON SIDE OF FLAT BED: A LITTLE
DAILY ACTIVITIY SCORE: 24
TURNING FROM BACK TO SIDE WHILE IN FLAT BAD: A LITTLE
CLIMB 3 TO 5 STEPS WITH RAILING: A LITTLE
MOVING TO AND FROM BED TO CHAIR: A LITTLE
SUGGESTED CMS G CODE MODIFIER MOBILITY: CK
SUGGESTED CMS G CODE MODIFIER DAILY ACTIVITY: CH

## 2023-03-27 ASSESSMENT — FIBROSIS 4 INDEX
FIB4 SCORE: 1.23
FIB4 SCORE: 1.23

## 2023-03-27 NOTE — PROGRESS NOTES
Patient arrived on monitor with ACLS RN. Neuro and Neurovascular assessment completed. Left femoral access site assessed with PACU RN. Site marked with date, time and initials around area of oozing brenda blood. Manual pressure applied for 5 minutes. Sand bag replaced. Notified Dr. Washington of patient arrival. All questions answered for patient at this time. Patient declines any pain at this time. Vitals stable. Patient son Steven at bedside. All questions answered for family member at this time.

## 2023-03-27 NOTE — OR NURSING
Patient recovered well in post-op. AAOx4. VSS, on 1L via NC. L groin access site oozing, dressing replaced and sandbag in place at 1215. Dr. Tomlin notified, bedrest increased from 2 hrs to 6 hrs. Declines pain. Patient tolerating fluids without nausea. Son updated and discussed POC. Patient belongings retrieved and on [facility]. Report called to ACACIA Sosa. Patient transported on monitor w/ RN to R103. O2 tank full for transport.

## 2023-03-27 NOTE — ASSESSMENT & PLAN NOTE
S/p left carotid stent  Continue asa/plavix for 6 weeks then will transition to monotherapy   OP follow up with Dr. Dennis in 2-6 weeks

## 2023-03-27 NOTE — CONSULTS
Critical Care Consultation    Date of consult: 3/27/2023    Referring Physician  Bull Dennis M.D.    Reason for Consultation  Post left carotid stent    History of Presenting Illness  68 y.o. female with a history of right MCA CVA in 2009, right-sided carotid artery stenosis s/p CEA in 2009, also right carotid artery stent in 2020.  She was admitted in February of this year for acute right-sided vision loss and was found to have greater than 70% stenosis of the left ICA.  For this she was brought in on 3/27 for elective left carotid stent placement with IR and will be admitted to the ICU postoperatively.     She reports to me that she did take her plavix this morning.    Code Status  Full Code    Review of Systems   Review of Systems   Constitutional:  Negative for chills, fever and malaise/fatigue.   HENT:  Negative for congestion and sore throat.    Eyes: Negative.    Respiratory:  Negative for sputum production and shortness of breath.    Cardiovascular:  Negative for chest pain and palpitations.   Gastrointestinal:  Negative for abdominal pain, nausea and vomiting.   Genitourinary: Negative.    Musculoskeletal: Negative.    Skin: Negative.    Neurological:  Negative for focal weakness and headaches.   All other systems reviewed and are negative.    Past Medical History   has a past medical history of Arthritis, Dental disorder (03/16/2023), High cholesterol (03/16/2023), Hypertension, Migraines, neuralgic, Mitral valve prolapse, Stroke (HCC) (06/04/2009), and Stroke (HCC) (03/16/2023).    Surgical History   has a past surgical history that includes carotid endarterectomy (6/6/2009) and gyn surgery (Bilateral).    Family History  family history is not on file.    Social History   reports that she has been smoking cigarettes. She started smoking about 42 years ago. She has a 40.00 pack-year smoking history. She has never used smokeless tobacco. She reports current drug use. Drug: Inhaled. She reports that  "she does not drink alcohol.    Medications  Home Medications       Reviewed by Allegra Anton R.N. (Registered Nurse) on 03/27/23 at 0828  Med List Status: Complete     Medication Last Dose Status   aspirin 81 MG EC tablet 3/24/2023 Active   clopidogrel (PLAVIX) 75 MG Tab 3/24/2023 Active   rosuvastatin (CRESTOR) 40 MG tablet  Active                  Current Facility-Administered Medications   Medication Dose Route Frequency Provider Last Rate Last Admin    ATROPINE SULFATE 0.1 MG/ML INJ SOLN             MIDAZOLAM HCL 2 MG/2ML INJ SOLN (WRAPPER)             PHENYLEPHRINE HCL-NACL 1-0.9 MG/10ML-% IV SOSY             [START ON 3/28/2023] aspirin (ASA) chewable tab 81 mg  81 mg Oral DAILY Bull Dennis M.D.        [START ON 3/28/2023] clopidogrel (PLAVIX) tablet 75 mg  75 mg Oral DAILY Bull Dennis M.D.        acetaminophen (Tylenol) tablet 650 mg  650 mg Oral Q6HRS PRN Cameron Washington M.D.        senna-docusate (PERICOLACE or SENOKOT S) 8.6-50 MG per tablet 2 Tablet  2 Tablet Oral BID Cameron Washington M.D.        And    polyethylene glycol/lytes (MIRALAX) PACKET 1 Packet  1 Packet Oral QDAY PRN Cameron Washington M.D.        And    magnesium hydroxide (MILK OF MAGNESIA) suspension 30 mL  30 mL Oral QDAY PRN Cameron Washington M.D.        And    bisacodyl (DULCOLAX) suppository 10 mg  10 mg Rectal QDAY PRN Cameron Washington M.D.        ondansetron (ZOFRAN) syringe/vial injection 4 mg  4 mg Intravenous Q4HRS PRN Cameron Washington M.D.        ondansetron (ZOFRAN ODT) dispertab 4 mg  4 mg Oral Q4HRS PRN Cameron Washington M.D.        labetalol (NORMODYNE/TRANDATE) injection 10 mg  10 mg Intravenous Q4HRS PRN Cameron Washington M.D.        hydrALAZINE (APRESOLINE) injection 10 mg  10 mg Intravenous Q4HRS PRN Cameron Washington M.D.           Allergies  Allergies   Allergen Reactions    Morphine      \"I don't know, it was during my recovery after the endarterectomy-they told me I had a bad reaction to it\"       Vital Signs last 24 " hours  Temp:  [36.1 °C (96.9 °F)-36.4 °C (97.5 °F)] 36.1 °C (96.9 °F)  Pulse:  [59-74] 60  Resp:  [13-24] 15  BP: (100-151)/(51-81) 123/59  SpO2:  [91 %-97 %] 95 %    Physical Exam   Physical Exam  Vitals and nursing note reviewed. Exam conducted with a chaperone present.   Constitutional:       General: She is not in acute distress.     Appearance: Normal appearance. She is not ill-appearing.   HENT:      Head: Normocephalic.      Mouth/Throat:      Mouth: Mucous membranes are moist.   Eyes:      Extraocular Movements: Extraocular movements intact.   Cardiovascular:      Rate and Rhythm: Normal rate and regular rhythm.      Pulses: Normal pulses.   Pulmonary:      Effort: Pulmonary effort is normal. No respiratory distress.   Abdominal:      General: There is no distension.      Palpations: Abdomen is soft.      Tenderness: There is no abdominal tenderness. There is no guarding or rebound.   Musculoskeletal:         General: Normal range of motion.      Cervical back: Normal range of motion and neck supple.   Skin:     General: Skin is warm and dry.      Capillary Refill: Capillary refill takes less than 2 seconds.   Neurological:      General: No focal deficit present.      Mental Status: She is alert.       Fluids    Intake/Output Summary (Last 24 hours) at 3/27/2023 1334  Last data filed at 3/27/2023 1215  Gross per 24 hour   Intake 60 ml   Output --   Net 60 ml       Laboratory  Recent Results (from the past 48 hour(s))   POCT activated clotting time device results    Collection Time: 03/27/23 11:08 AM   Result Value Ref Range    Istat Activated Clotting Time 227 (H) 74 - 137 sec       Imaging  IR-CAROTID-CEREBRAL BILATERAL    (Results Pending)       Assessment/Plan  * Carotid stenosis, left- (present on admission)  Assessment & Plan  S/p left carotid stent  Continue asa/plavix  Neuro checks    Cerebrovascular accident (CVA) due to occlusion of right middle cerebral artery (HCC)  Assessment & Plan  Continue  asa/statin  Old        Discussed patient condition and risk of morbidity and/or mortality with RN, RT, Pharmacy, Code status disscussed, Charge nurse / hot rounds, Patient, and IR .

## 2023-03-27 NOTE — PROGRESS NOTES
4 Eyes Skin Assessment Completed by ACACIA Sosa and ACACIA Houston.    Head WDL  Ears WDL  Nose WDL  Mouth Redness  Neck WDL  Breast/Chest Redness and Blanching  Shoulder Blades Redness and Blanching  Spine Redness and Blanching  (R) Arm/Elbow/Hand WDL  (L) Arm/Elbow/Hand WDL  Abdomen Redness and Blanching  Groin Incision, left femoral access site, gauze with Tegaderm in place  Scrotum/Coccyx/Buttocks Redness and Blanching  (R) Leg Redness and Blanching  (L) Leg Redness and Blanching  (R) Heel/Foot/Toe Redness and Blanching  (L) Heel/Foot/Toe Redness and Blanching          Devices In Places ECG, Blood Pressure Cuff, Pulse Ox, SCD's, and Nasal Cannula      Interventions In Place NC W/Ear Foams, Heel Mepilex, Waffle Overlay, Pillows, Q2 Turns, and Heels Loaded W/Pillows    Possible Skin Injury No    Pictures Uploaded Into Epic N/A  Wound Consult Placed N/A  RN Wound Prevention Protocol Ordered No

## 2023-03-27 NOTE — PROGRESS NOTES
IR Nursing Note    Left carotid artery stent placement by MD Dennis assisted by RT Oneil, right femoral artery access site.  Patient was consented by MD and confirmed by this RN. Procedure site was marked by MD and verified using imaging guidance.  Patient was placed in a supine position.  Patient was prepped and draped in a sterile fashion. Vitals were taken every 5 minutes and continues CO2 waveform capnography, all remained stable during procedure(see doc flow sheet for results).   A gauze and tegaderm dressing placed over surgical site.     Pre- and post-procedure neuro exam: no deficits.    Report given to ACACIA Angulo.  Patient transported to PACU 15B via IR RN monitored then transferred care to report RN.    Omnilink Elite vascular ballon-expandable stent system:  REF:  [id number]  LOT:  [id number]  EXP:   2025-04-30    StarClose E vascual closure system  REF: [id number]  LOT [id number]  EXP  2024-09-01

## 2023-03-27 NOTE — OR SURGEON
Immediate Post- Operative Note        Findings: Left carotid stensis      Procedure(s): Left carotid origin stent placement       Estimated Blood Loss: Less than 5 ml        Complications: None            3/27/2023     11:46 AM     Bull Dennis M.D.

## 2023-03-28 DIAGNOSIS — I65.22 CAROTID STENOSIS, LEFT: ICD-10-CM

## 2023-03-28 PROBLEM — J96.01 ACUTE RESPIRATORY FAILURE WITH HYPOXIA (HCC): Status: ACTIVE | Noted: 2023-03-28

## 2023-03-28 PROBLEM — R09.02 HYPOXIA: Status: ACTIVE | Noted: 2023-03-28

## 2023-03-28 PROBLEM — Z72.0 TOBACCO USE: Status: ACTIVE | Noted: 2023-03-28

## 2023-03-28 PROBLEM — M51.16 LUMBAR DISC DISEASE WITH RADICULOPATHY: Status: ACTIVE | Noted: 2023-03-28

## 2023-03-28 LAB
ALBUMIN SERPL BCP-MCNC: 3.6 G/DL (ref 3.2–4.9)
ALBUMIN/GLOB SERPL: 1.2 G/DL
ALP SERPL-CCNC: 86 U/L (ref 30–99)
ALT SERPL-CCNC: 24 U/L (ref 2–50)
ANION GAP SERPL CALC-SCNC: 8 MMOL/L (ref 7–16)
AST SERPL-CCNC: 18 U/L (ref 12–45)
BILIRUB SERPL-MCNC: 0.6 MG/DL (ref 0.1–1.5)
BUN SERPL-MCNC: 15 MG/DL (ref 8–22)
CALCIUM ALBUM COR SERPL-MCNC: 8.9 MG/DL (ref 8.5–10.5)
CALCIUM SERPL-MCNC: 8.6 MG/DL (ref 8.5–10.5)
CHLORIDE SERPL-SCNC: 105 MMOL/L (ref 96–112)
CO2 SERPL-SCNC: 22 MMOL/L (ref 20–33)
CREAT SERPL-MCNC: 0.58 MG/DL (ref 0.5–1.4)
ERYTHROCYTE [DISTWIDTH] IN BLOOD BY AUTOMATED COUNT: 45.2 FL (ref 35.9–50)
GFR SERPLBLD CREATININE-BSD FMLA CKD-EPI: 99 ML/MIN/1.73 M 2
GLOBULIN SER CALC-MCNC: 3 G/DL (ref 1.9–3.5)
GLUCOSE SERPL-MCNC: 108 MG/DL (ref 65–99)
HCT VFR BLD AUTO: 43.9 % (ref 37–47)
HGB BLD-MCNC: 14.2 G/DL (ref 12–16)
MAGNESIUM SERPL-MCNC: 2 MG/DL (ref 1.5–2.5)
MCH RBC QN AUTO: 28.6 PG (ref 27–33)
MCHC RBC AUTO-ENTMCNC: 32.3 G/DL (ref 33.6–35)
MCV RBC AUTO: 88.3 FL (ref 81.4–97.8)
PLATELET # BLD AUTO: 220 K/UL (ref 164–446)
PMV BLD AUTO: 9.4 FL (ref 9–12.9)
POTASSIUM SERPL-SCNC: 4.3 MMOL/L (ref 3.6–5.5)
PROT SERPL-MCNC: 6.6 G/DL (ref 6–8.2)
RBC # BLD AUTO: 4.97 M/UL (ref 4.2–5.4)
SODIUM SERPL-SCNC: 135 MMOL/L (ref 135–145)
WBC # BLD AUTO: 6.9 K/UL (ref 4.8–10.8)

## 2023-03-28 PROCEDURE — A9270 NON-COVERED ITEM OR SERVICE: HCPCS

## 2023-03-28 PROCEDURE — 80053 COMPREHEN METABOLIC PANEL: CPT

## 2023-03-28 PROCEDURE — A9270 NON-COVERED ITEM OR SERVICE: HCPCS | Performed by: STUDENT IN AN ORGANIZED HEALTH CARE EDUCATION/TRAINING PROGRAM

## 2023-03-28 PROCEDURE — 700102 HCHG RX REV CODE 250 W/ 637 OVERRIDE(OP): Performed by: HOSPITALIST

## 2023-03-28 PROCEDURE — 700111 HCHG RX REV CODE 636 W/ 250 OVERRIDE (IP): Performed by: HOSPITALIST

## 2023-03-28 PROCEDURE — 700102 HCHG RX REV CODE 250 W/ 637 OVERRIDE(OP): Performed by: RADIOLOGY

## 2023-03-28 PROCEDURE — 99223 1ST HOSP IP/OBS HIGH 75: CPT | Performed by: HOSPITALIST

## 2023-03-28 PROCEDURE — 700102 HCHG RX REV CODE 250 W/ 637 OVERRIDE(OP): Performed by: NURSE PRACTITIONER

## 2023-03-28 PROCEDURE — 770006 HCHG ROOM/CARE - MED/SURG/GYN SEMI*

## 2023-03-28 PROCEDURE — 99233 SBSQ HOSP IP/OBS HIGH 50: CPT | Mod: GC | Performed by: INTERNAL MEDICINE

## 2023-03-28 PROCEDURE — A9270 NON-COVERED ITEM OR SERVICE: HCPCS | Performed by: HOSPITALIST

## 2023-03-28 PROCEDURE — 85027 COMPLETE CBC AUTOMATED: CPT

## 2023-03-28 PROCEDURE — 700102 HCHG RX REV CODE 250 W/ 637 OVERRIDE(OP): Performed by: STUDENT IN AN ORGANIZED HEALTH CARE EDUCATION/TRAINING PROGRAM

## 2023-03-28 PROCEDURE — A9270 NON-COVERED ITEM OR SERVICE: HCPCS | Performed by: NURSE PRACTITIONER

## 2023-03-28 PROCEDURE — A9270 NON-COVERED ITEM OR SERVICE: HCPCS | Performed by: RADIOLOGY

## 2023-03-28 PROCEDURE — 83735 ASSAY OF MAGNESIUM: CPT

## 2023-03-28 PROCEDURE — 700102 HCHG RX REV CODE 250 W/ 637 OVERRIDE(OP)

## 2023-03-28 RX ORDER — FUROSEMIDE 10 MG/ML
20 INJECTION INTRAMUSCULAR; INTRAVENOUS ONCE
Status: COMPLETED | OUTPATIENT
Start: 2023-03-28 | End: 2023-03-28

## 2023-03-28 RX ORDER — CYCLOBENZAPRINE HCL 10 MG
5 TABLET ORAL 3 TIMES DAILY PRN
Status: DISCONTINUED | OUTPATIENT
Start: 2023-03-28 | End: 2023-03-29 | Stop reason: HOSPADM

## 2023-03-28 RX ORDER — ROSUVASTATIN CALCIUM 20 MG/1
40 TABLET, COATED ORAL DAILY
Status: DISCONTINUED | OUTPATIENT
Start: 2023-03-28 | End: 2023-03-29 | Stop reason: HOSPADM

## 2023-03-28 RX ORDER — HYDROCODONE BITARTRATE AND ACETAMINOPHEN 5; 325 MG/1; MG/1
1-2 TABLET ORAL EVERY 6 HOURS PRN
Status: DISCONTINUED | OUTPATIENT
Start: 2023-03-28 | End: 2023-03-29 | Stop reason: HOSPADM

## 2023-03-28 RX ADMIN — CLOPIDOGREL BISULFATE 75 MG: 75 TABLET ORAL at 06:11

## 2023-03-28 RX ADMIN — ACETAMINOPHEN 650 MG: 325 TABLET, FILM COATED ORAL at 00:23

## 2023-03-28 RX ADMIN — ASPIRIN 81 MG 81 MG: 81 TABLET ORAL at 06:12

## 2023-03-28 RX ADMIN — SENNOSIDES AND DOCUSATE SODIUM 2 TABLET: 50; 8.6 TABLET ORAL at 17:57

## 2023-03-28 RX ADMIN — ROSUVASTATIN CALCIUM 40 MG: 20 TABLET, FILM COATED ORAL at 17:57

## 2023-03-28 RX ADMIN — HYDROCODONE BITARTRATE AND ACETAMINOPHEN 2 TABLET: 5; 325 TABLET ORAL at 00:44

## 2023-03-28 RX ADMIN — UMECLIDINIUM BROMIDE AND VILANTEROL TRIFENATATE 1 PUFF: 62.5; 25 POWDER RESPIRATORY (INHALATION) at 16:05

## 2023-03-28 RX ADMIN — FUROSEMIDE 20 MG: 10 INJECTION, SOLUTION INTRAMUSCULAR; INTRAVENOUS at 17:58

## 2023-03-28 ASSESSMENT — ENCOUNTER SYMPTOMS
PHOTOPHOBIA: 0
GASTROINTESTINAL NEGATIVE: 1
COUGH: 0
CHILLS: 0
SPEECH CHANGE: 0
NECK PAIN: 0
NAUSEA: 0
MEMORY LOSS: 0
SENSORY CHANGE: 0
EYES NEGATIVE: 1
FOCAL WEAKNESS: 0
WEAKNESS: 0
WEAKNESS: 1
SPUTUM PRODUCTION: 0
FEVER: 0
SORE THROAT: 0
POLYDIPSIA: 0
DIZZINESS: 0
BLURRED VISION: 0
WHEEZING: 1
CARDIOVASCULAR NEGATIVE: 1
BRUISES/BLEEDS EASILY: 0
RESPIRATORY NEGATIVE: 1
SHORTNESS OF BREATH: 1
HEADACHES: 0
DOUBLE VISION: 0
BACK PAIN: 1
ABDOMINAL PAIN: 0
PALPITATIONS: 0
VOMITING: 0
HEARTBURN: 0
INSOMNIA: 0
DEPRESSION: 0
NEUROLOGICAL NEGATIVE: 1

## 2023-03-28 ASSESSMENT — COGNITIVE AND FUNCTIONAL STATUS - GENERAL
DAILY ACTIVITIY SCORE: 24
WALKING IN HOSPITAL ROOM: A LITTLE
SUGGESTED CMS G CODE MODIFIER DAILY ACTIVITY: CH
CLIMB 3 TO 5 STEPS WITH RAILING: A LITTLE
SUGGESTED CMS G CODE MODIFIER MOBILITY: CJ
MOBILITY SCORE: 22

## 2023-03-28 ASSESSMENT — PAIN DESCRIPTION - PAIN TYPE
TYPE: ACUTE PAIN
TYPE: SURGICAL PAIN
TYPE: CHRONIC PAIN
TYPE: CHRONIC PAIN
TYPE: ACUTE PAIN
TYPE: CHRONIC PAIN
TYPE: SURGICAL PAIN

## 2023-03-28 ASSESSMENT — LIFESTYLE VARIABLES: SUBSTANCE_ABUSE: 1

## 2023-03-28 NOTE — CONSULTS
Hospital Medicine Consultation    Date of Service  3/28/2023    Referring Physician  Bull Dennis M.D.    Consulting Physician  Damian Ayers M.D.    Reason for Consultation  Hospital medicine consultation requested in a patient s/p left carotid stent placement, additional hypoxemia, weakness postoperatively encountered.    History of Presenting Illness  68 y.o. female who presented 3/27/2023 with a history of right MCA CVA in 2009, right-sided carotid artery stenosis, s/p CEA in 2009, s/p right carotid artery stent in 2020, ongoing tobacco abuse, recent admission secondary to right-sided vision loss and greater than 70% stenosis of the left ICA, chronic low back pain, using a cane for ambulation, extensive tobacco abuse history.  The patient underwent the left carotid stent placement on 3/27 without significant difficulty, she did have some difficulty with her oxygen saturations and required supplementation up to 4 L with exertion, the patient denies prior oxygen needs or difficulty with her breathing despite a 30+ pack year history of smoking.  She unfortunately continues to smoke a pack a day.  The patient denies chest pain, she has a left groin intervention site that is covered with a dressing, there is no pain or hematoma noted, the patient in addition had some difficulty with ambulation, weakness, noted by the nurse to have some urinary incontinence.  The patient therefore transferred to the medical unit for further optimization and weaning off oxygen hopefully.  The patient my evaluation denies currently any pain, she does have some chronic low back pain, she denies much dyspnea, she remains on oxygen currently.  She denies current nicotine withdrawal.  Review of Systems  Review of Systems   Constitutional:  Positive for malaise/fatigue. Negative for chills and fever.   HENT: Negative.     Eyes: Negative.    Respiratory: Negative.  Negative for cough.    Cardiovascular: Negative.  Negative for  chest pain and palpitations.   Gastrointestinal: Negative.  Negative for heartburn, nausea and vomiting.   Genitourinary: Negative.  Negative for dysuria and frequency.   Musculoskeletal:  Positive for back pain. Negative for neck pain.   Skin: Negative.  Negative for itching and rash.   Neurological: Negative.  Negative for dizziness, focal weakness, weakness and headaches.   Endo/Heme/Allergies: Negative.  Negative for polydipsia. Does not bruise/bleed easily.   Psychiatric/Behavioral:  Positive for substance abuse. Negative for depression.      Past Medical History   has a past medical history of Arthritis, Dental disorder (03/16/2023), High cholesterol (03/16/2023), Hypertension, Migraines, neuralgic, Mitral valve prolapse, Stroke ([facility]) (06/04/2009), and Stroke ([facility]) (03/16/2023).  Tobacco abuse    Surgical History   has a past surgical history that includes carotid endarterectomy (6/6/2009) and gyn surgery (Bilateral).    Family History  Patient denies pertinent family history contributory to this current admission    Social History   reports that she has been smoking cigarettes. She started smoking about 42 years ago. She has a 40.00 pack-year smoking history. She has never used smokeless tobacco. She reports current drug use. Drug: Inhaled. She reports that she does not drink alcohol.    Medications  Prior to Admission Medications   Prescriptions Last Dose Informant Patient Reported? Taking?   aspirin 81 MG EC tablet 3/24/2023 at am Patient No No   Sig: Take 1 Tablet by mouth every day for 90 days.   clopidogrel (PLAVIX) 75 MG Tab 3/27/2023 at 0600  No No   Sig: Take 1 Tablet by mouth every day for 60 days. Start this medication 5 days prior to your stent procedure.   rosuvastatin (CRESTOR) 40 MG tablet  Patient No No   Sig: Take 1 Tablet by mouth every day.   Patient taking differently: Take 80 mg by mouth every day.      Facility-Administered Medications: None       Allergies  Allergies   Allergen Reactions  "   Morphine      \"I don't know, it was during my recovery after the endarterectomy-they told me I had a bad reaction to it\"       Physical Exam  Temp:  [35.9 °C (96.6 °F)-36.4 °C (97.6 °F)] 35.9 °C (96.6 °F)  Pulse:  [62-75] 64  Resp:  [14-26] 20  BP: ()/(40-66) 112/44  SpO2:  [88 %-96 %] 93 %    Physical Exam  Vitals and nursing note reviewed.   Constitutional:       Appearance: She is well-developed. She is not diaphoretic.   HENT:      Head: Normocephalic and atraumatic.      Nose: Nose normal.   Eyes:      Conjunctiva/sclera: Conjunctivae normal.      Pupils: Pupils are equal, round, and reactive to light.   Neck:      Thyroid: No thyromegaly.      Vascular: No JVD.   Cardiovascular:      Rate and Rhythm: Normal rate and regular rhythm.      Heart sounds: Normal heart sounds.     No friction rub. No gallop.      Comments: Left groin vascular access site benign, dressing in place  Pulmonary:      Effort: Pulmonary effort is normal.      Breath sounds: Rhonchi present. No wheezing or rales.   Abdominal:      General: Bowel sounds are normal. There is no distension.      Palpations: Abdomen is soft. There is no mass.      Tenderness: There is no abdominal tenderness. There is no guarding or rebound.   Musculoskeletal:         General: No tenderness. Normal range of motion.      Cervical back: Normal range of motion and neck supple.   Lymphadenopathy:      Cervical: No cervical adenopathy.   Skin:     General: Skin is warm and dry.   Neurological:      Mental Status: She is alert and oriented to person, place, and time.      Cranial Nerves: No cranial nerve deficit.      Motor: Weakness present.   Psychiatric:         Behavior: Behavior normal.       Fluids      Laboratory  Recent Labs     03/28/23  0440   WBC 6.9   RBC 4.97   HEMOGLOBIN 14.2   HEMATOCRIT 43.9   MCV 88.3   MCH 28.6   MCHC 32.3*   RDW 45.2   PLATELETCT 220   MPV 9.4     Recent Labs     03/28/23  0440   SODIUM 135   POTASSIUM 4.3   CHLORIDE 105 "   CO2 22   GLUCOSE 108*   BUN 15   CREATININE 0.58   CALCIUM 8.6                     Imaging  IR-CAROTID-CEREBRAL BILATERAL   Final Result      Successful stent placement across the severely stenosed origin of left common carotid artery.          Assessment/Plan  * Carotid stenosis, left- (present on admission)  Assessment & Plan  S/p stenting through the IR service,  Continue antiplatelet therapy  High intensity statin  Smoking cessation strongly encouraged    Acute respiratory failure with hypoxia (HCC)  Assessment & Plan  Likely multifactorial, possibly atelectasis, COPD with current tobacco abuse  Incentive spirometry  Oxygen weaning  Possible need for oxygen on discharge    Lumbar disc disease with radiculopathy  Assessment & Plan  Pre-existing, the patient ambulates with a cane  Monitor, PT OT eval    Hypoxia  Assessment & Plan  Patient with longstanding tobacco abuse disorder, possible COPD  Unclear if the patient might have a cardiac component  We will engage the patient in incentive spirometry, respiratory therapy, weaning of oxygen  A.m. lab follow-up, mobilization    Tobacco use- (present on admission)  Assessment & Plan  Wrongly encouraged to enroll in outpatient smoking cessation    Cerebrovascular accident (CVA) due to occlusion of right middle cerebral artery (HCC)  Assessment & Plan  History of, secondary to vascular disease, ongoing antiplatelet therapy, high intensity statin  Smoking cessation paramount    Other hyperlipidemia- (present on admission)  Assessment & Plan  Continue statin therapy    Hypertension- (present on admission)  Assessment & Plan  Monitor, medication as indicated    Plan  Continue dual antiplatelet therapy  Restart statin therapy  Respiratory support, incentive spirometry  Dose of forced diuresis to evaluate if she might have some degree of volume overload  Consider outpatient echocardiogram, referral to smoking cessation and pulmonary rehab  Ambulate, PT OT, evaluate for  discharge safety  See orders  Medically complex high-risk patient  Thank you for consulting with us, we will follow along closely while the patient is hospitalized and hopefully facilitate discharge to home soon        Please note that this dictation was created using voice recognition software. I have made every reasonable attempt to correct obvious errors, but I expect that there are errors of grammar and possibly context that I did not discover before finalizing the note.

## 2023-03-28 NOTE — ASSESSMENT & PLAN NOTE
Likely multifactorial, possibly atelectasis, COPD with current tobacco abuse  Incentive spirometry  Oxygen weaning  Possible need for oxygen on discharge

## 2023-03-28 NOTE — CARE PLAN
The patient is Stable - Low risk of patient condition declining or worsening    Shift Goals  Clinical Goals: Vitals per policy, maintain hemodynamic stability, neurovascular assessments  Patient Goals: Move, decreased lower back pain  Family Goals: Updates    Progress made toward(s) clinical / shift goals:  Vitals and hemodynamic monitoring in place, neuro and neurovascular assessments as ordered and as needed in place. Bed Rest/Flat order completed at 1800.      Problem: Knowledge Deficit - Standard  Goal: Patient and family/care givers will demonstrate understanding of plan of care, disease process/condition, diagnostic tests and medications  Outcome: Progressing     Problem: Pain - Standard  Goal: Alleviation of pain or a reduction in pain to the patient’s comfort goal  Outcome: Progressing  Note: Pain rating scale explained. Pharmacological and non-pharmacological options explained. Initial and serial reassessments in place.        Patient is not progressing towards the following goals:  N/A

## 2023-03-28 NOTE — CARE PLAN
The patient is Stable - Low risk of patient condition declining or worsening    Shift Goals  Clinical Goals: back pain control, mobilize  Patient Goals: comfort/rest  Family Goals: na    Progress made toward(s) clinical / shift goals:    Problem: Knowledge Deficit - Standard  Goal: Patient and family/care givers will demonstrate understanding of plan of care, disease process/condition, diagnostic tests and medications  Outcome: Not Progressing     Problem: Skin Integrity  Goal: Skin integrity is maintained or improved  Outcome: Not Progressing     Problem: Pain - Standard  Goal: Alleviation of pain or a reduction in pain to the patient’s comfort goal  Outcome: Not Progressing

## 2023-03-28 NOTE — PROGRESS NOTES
No increase in drainage noted. Changed left femoral access site dressing per protocol. Sandbag removed.

## 2023-03-28 NOTE — ASSESSMENT & PLAN NOTE
S/p stenting through the IR service,  Continue antiplatelet therapy  High intensity statin  Smoking cessation strongly encouraged

## 2023-03-28 NOTE — DISCHARGE PLANNING
Care Transition Team Discharge Planning                   Discharge Plan:  Choice obtained for home O2 - [facility] first choice, [facility] second choice

## 2023-03-28 NOTE — ASSESSMENT & PLAN NOTE
Likely chronic hypoxia in setting of 30 pack year smoking history.  High suspicion for emphysema or COPD.  No OP PCP or pulmonologist.   Requiring 2 L at rest and up to 4 L with exertion.     - start anora  - will need OP pulmonology referral  - will likely need O2 at discharge  - smoking cessation counseling

## 2023-03-28 NOTE — PROGRESS NOTES
[facility] ICU Progress Note      Admit Date: 3/27/2023    Resident(s): Tanisha Sanders M.D.   Attending:  ANDRAE HITCHCOCK/ Dr. Rodriguez    Patient ID:    Name:  Natividad Sewell   YOB: 1955  Age:  68 y.o.  female   MRN:  [id number]    Hospital Course (carried forward and updated):  Per Dr. Washington's note from 3/27:    Natividad Sewell is a 68 y.o. female with a history of right MCA CVA in 2009, right-sided carotid artery stenosis s/p CEA in 2009, also right carotid artery stent in 2020.  She was admitted in February of this year for acute right-sided vision loss and was found to have greater than 70% stenosis of the left ICA.  For this she was brought in on 3/27 for elective left carotid stent placement with IR and will be admitted to the ICU postoperatively.      She reports to me that she did take her plavix this morning.     3/28: s/p L carotid stent. IR neuro signed off. Will need OP follow up in 2-6 weeks. Noted by RN to have some urinary incontinence, weakness. Also with ongoing O2 requirement up to 4 L with exertion. Transfer to floor for optimization, PT/OT prior to discharge.     Consultants:  Critical Care  Neuro IR- signed off 3/28.    Interval Events:    Last 24 hr events reviewed.    NEURO:   A&o x4. No focal deficits.  CARDIOVASC:  BP 110s-120s. NSR.   RESPIRATORY:  Satting 88-92 % on 2 L NC with rest and requiring up to 4 L with exertion.  GI/NUTRITION:  Tolerating PO diet  RENAL/FLUID/LYTES: CMP unremarkable. Good UOP.   HEME/ONC:   CBC uremarkable.   INFECTIOUS D:  N/a  ENDOCRINE:   N/a    Vitals Range last 24h:  Temp:  [35.6 °C (96.1 °F)-36.4 °C (97.6 °F)] 36.2 °C (97.1 °F)  Pulse:  [45-75] 64  Resp:  [13-26] 24  BP: ()/(51-68) 121/56  SpO2:  [89 %-97 %] 94 %      Intake/Output Summary (Last 24 hours) at 3/28/2023 1226  Last data filed at 3/28/2023 0600  Gross per 24 hour   Intake 120 ml   Output 1000 ml   Net -880 ml        Review of Systems   Constitutional:  Negative for chills and  fever.   HENT:  Negative for congestion and sore throat.    Eyes:  Negative for blurred vision, double vision and photophobia.   Respiratory:  Positive for shortness of breath and wheezing. Negative for sputum production.    Cardiovascular:  Negative for chest pain and palpitations.   Gastrointestinal:  Negative for abdominal pain, nausea and vomiting.   Genitourinary:  Negative for dysuria and hematuria.   Musculoskeletal:  Positive for back pain.   Neurological:  Positive for weakness. Negative for dizziness, sensory change, speech change, focal weakness and headaches.   Psychiatric/Behavioral:  Negative for depression and memory loss. The patient does not have insomnia.      PHYSICAL EXAM:  Vitals:    03/28/23 0600 03/28/23 0800 03/28/23 0817 03/28/23 1155   BP: 110/53  123/57 121/56   Pulse: 68 64 63 64   Resp: 17 15 19 (!) 24   Temp:   36.4 °C (97.6 °F) 36.2 °C (97.1 °F)   TempSrc:   Temporal Temporal   SpO2: 91% 96% 96% 94%   Weight:       Height:        Body mass index is 31.11 kg/m².    O2 therapy: Pulse Oximetry: 94 %, O2 (LPM): 2, O2 Delivery Device: Nasal Cannula         Physical Exam  Constitutional:       General: She is not in acute distress.     Appearance: Normal appearance. She is not toxic-appearing.   HENT:      Head: Normocephalic and atraumatic.      Mouth/Throat:      Mouth: Mucous membranes are moist.      Pharynx: No oropharyngeal exudate.   Eyes:      Extraocular Movements: Extraocular movements intact.      Pupils: Pupils are equal, round, and reactive to light.   Cardiovascular:      Rate and Rhythm: Normal rate and regular rhythm.      Pulses: Normal pulses.      Heart sounds: Normal heart sounds. No murmur heard.  Pulmonary:      Effort: Pulmonary effort is normal. No respiratory distress.      Breath sounds: Wheezing present.   Abdominal:      General: There is no distension.      Palpations: Abdomen is soft.      Tenderness: There is no abdominal tenderness.   Musculoskeletal:          General: No swelling or deformity. Normal range of motion.      Cervical back: Normal range of motion.      Right lower leg: No edema.      Left lower leg: No edema.   Skin:     General: Skin is warm and dry.   Neurological:      General: No focal deficit present.      Mental Status: She is alert and oriented to person, place, and time.   Psychiatric:         Mood and Affect: Mood normal.         Behavior: Behavior normal.         Thought Content: Thought content normal.         Judgment: Judgment normal.           Recent Labs     03/28/23  0440   SODIUM 135   POTASSIUM 4.3   CHLORIDE 105   CO2 22   BUN 15   CREATININE 0.58   MAGNESIUM 2.0   CALCIUM 8.6     Recent Labs     03/28/23  0440   ALTSGPT 24   ASTSGOT 18   ALKPHOSPHAT 86   TBILIRUBIN 0.6   GLUCOSE 108*     Recent Labs     03/28/23  0440   RBC 4.97   HEMOGLOBIN 14.2   HEMATOCRIT 43.9   PLATELETCT 220     Recent Labs     03/28/23 0440   WBC 6.9   ASTSGOT 18   ALTSGPT 24   ALKPHOSPHAT 86   TBILIRUBIN 0.6       Meds:   cyclobenzaprine  5 mg      HYDROcodone-acetaminophen  1-2 Tablet      umeclidinium-vilanterol  1 Puff      aspirin  81 mg      clopidogrel  75 mg      acetaminophen  650 mg      senna-docusate  2 Tablet      And    polyethylene glycol/lytes  1 Packet      And    magnesium hydroxide  30 mL      And    bisacodyl  10 mg      ondansetron  4 mg      ondansetron  4 mg      labetalol  10 mg      hydrALAZINE  10 mg          Procedures:  3/27- L carotid origin stent placement    Imaging:  IR-CAROTID-CEREBRAL BILATERAL   Final Result      Successful stent placement across the severely stenosed origin of left common carotid artery.          ASSESSEMENT and PLAN:    * Carotid stenosis, left- (present on admission)  Assessment & Plan  S/p left carotid stent  Continue asa/plavix for 6 weeks then will transition to monotherapy   OP follow up with Dr. Dennis in 2-6 weeks    Hypoxia  Assessment & Plan  Likely chronic hypoxia in setting of 30 pack year  smoking history.  High suspicion for emphysema or COPD.  No OP PCP or pulmonologist.   Requiring 2 L at rest and up to 4 L with exertion.     - start anora  - will need OP pulmonology referral  - will likely need O2 at discharge  - smoking cessation counseling    Tobacco use  Assessment & Plan  30 pack year history    -smoking cessation counseling  -Needs to establish with PCP    Cerebrovascular accident (CVA) due to occlusion of right middle cerebral artery (HCC)  Assessment & Plan  Continue asa/statin  Old        DISPO: Home pending PT/OT evaluation    CODE STATUS: FULL    Quality Measures:  Feeding: PO diet  Analgesia: tylenol, norco prn  Sedation: n/a  Thromboprophylaxis: DAPT  Head of bed: >30 degrees  Ulcer prophylaxis: n/a  Glycemic control: n/a  Bowel care: bowel regimen prn  Indwelling lines: PIV  Deescalation of antibiotics: n/a      Tanisha Sanders M.D.

## 2023-03-28 NOTE — ASSESSMENT & PLAN NOTE
Patient with longstanding tobacco abuse disorder, possible COPD  Unclear if the patient might have a cardiac component  We will engage the patient in incentive spirometry, respiratory therapy, weaning of oxygen  A.m. lab follow-up, mobilization

## 2023-03-28 NOTE — PROGRESS NOTES
Walked pt 2 small laps around nursing station, desat to 81% on RA, pt c/o backpain and some sob, sts is not normally sob from walking. Pt placed on 2lnc and is 94% at rest. Notified resident.

## 2023-03-28 NOTE — PROGRESS NOTES
S/p uncomplicated endovascular neurointervention on 3/27/23 by Bull Dennis MD (AYAKA, x [phone]): stent placement for severe left carotid artery origin stenosis. Additional history of right COLTON, patent on angiogram. Admitted to ICU for post procedure neurologic monitoring and supportive care.     Today, the patient complains of back pain. Denies headache and vision changes.   Awake, oriented x 4. Appears uncomfortable in bed. Face symmetric, speech fluent. No drift. LEFT angio site WNL. Distal pulses 1+, skin warm, cap refill <2 sec.     From a NeuroInterventional Service standpoint, OK to discharge to home when medically cleared by Hospitalist Service. Patient to follow up in our clinic in 2-6 weeks, our office to arrange appointment. Does need to continue aspirin and Plavix from our standpoint, with DAPT x 6 weeks, then resume monotherapy. She confirms she has both of these medications at home.    For femoral arterial access:  Post-angioseal instructions: no lifting greater than 5 lbs and no baths/ swimming/ soaking in tub for 5 days. Shower OK. OK to change dressings to band aid as needed. Then resume usual activity.

## 2023-03-29 VITALS
SYSTOLIC BLOOD PRESSURE: 106 MMHG | HEIGHT: 64 IN | HEART RATE: 78 BPM | RESPIRATION RATE: 18 BRPM | BODY MASS INDEX: 30.94 KG/M2 | TEMPERATURE: 97.6 F | OXYGEN SATURATION: 93 % | DIASTOLIC BLOOD PRESSURE: 66 MMHG | WEIGHT: 181.22 LBS

## 2023-03-29 LAB
ALBUMIN SERPL BCP-MCNC: 3.9 G/DL (ref 3.2–4.9)
ALBUMIN/GLOB SERPL: 1.4 G/DL
ALP SERPL-CCNC: 91 U/L (ref 30–99)
ALT SERPL-CCNC: 21 U/L (ref 2–50)
ANION GAP SERPL CALC-SCNC: 12 MMOL/L (ref 7–16)
ANION GAP SERPL CALC-SCNC: 12 MMOL/L (ref 7–16)
AST SERPL-CCNC: 18 U/L (ref 12–45)
BILIRUB SERPL-MCNC: 0.4 MG/DL (ref 0.1–1.5)
BUN SERPL-MCNC: 14 MG/DL (ref 8–22)
BUN SERPL-MCNC: 14 MG/DL (ref 8–22)
CALCIUM ALBUM COR SERPL-MCNC: 8.8 MG/DL (ref 8.5–10.5)
CALCIUM SERPL-MCNC: 8.7 MG/DL (ref 8.5–10.5)
CALCIUM SERPL-MCNC: 8.7 MG/DL (ref 8.5–10.5)
CHLORIDE SERPL-SCNC: 104 MMOL/L (ref 96–112)
CHLORIDE SERPL-SCNC: 104 MMOL/L (ref 96–112)
CO2 SERPL-SCNC: 22 MMOL/L (ref 20–33)
CO2 SERPL-SCNC: 22 MMOL/L (ref 20–33)
CREAT SERPL-MCNC: 0.55 MG/DL (ref 0.5–1.4)
CREAT SERPL-MCNC: 0.55 MG/DL (ref 0.5–1.4)
ERYTHROCYTE [DISTWIDTH] IN BLOOD BY AUTOMATED COUNT: 44.7 FL (ref 35.9–50)
GFR SERPLBLD CREATININE-BSD FMLA CKD-EPI: 100 ML/MIN/1.73 M 2
GLOBULIN SER CALC-MCNC: 2.8 G/DL (ref 1.9–3.5)
GLUCOSE SERPL-MCNC: 113 MG/DL (ref 65–99)
GLUCOSE SERPL-MCNC: 113 MG/DL (ref 65–99)
HCT VFR BLD AUTO: 44.9 % (ref 37–47)
HGB BLD-MCNC: 14.7 G/DL (ref 12–16)
MAGNESIUM SERPL-MCNC: 2.1 MG/DL (ref 1.5–2.5)
MCH RBC QN AUTO: 28.7 PG (ref 27–33)
MCHC RBC AUTO-ENTMCNC: 32.7 G/DL (ref 33.6–35)
MCV RBC AUTO: 87.7 FL (ref 81.4–97.8)
NT-PROBNP SERPL IA-MCNC: 179 PG/ML (ref 0–125)
PHOSPHATE SERPL-MCNC: 3.3 MG/DL (ref 2.5–4.5)
PLATELET # BLD AUTO: 234 K/UL (ref 164–446)
PMV BLD AUTO: 9.4 FL (ref 9–12.9)
POTASSIUM SERPL-SCNC: 4 MMOL/L (ref 3.6–5.5)
POTASSIUM SERPL-SCNC: 4 MMOL/L (ref 3.6–5.5)
PROT SERPL-MCNC: 6.7 G/DL (ref 6–8.2)
RBC # BLD AUTO: 5.12 M/UL (ref 4.2–5.4)
SODIUM SERPL-SCNC: 138 MMOL/L (ref 135–145)
SODIUM SERPL-SCNC: 138 MMOL/L (ref 135–145)
WBC # BLD AUTO: 6.5 K/UL (ref 4.8–10.8)

## 2023-03-29 PROCEDURE — 83735 ASSAY OF MAGNESIUM: CPT

## 2023-03-29 PROCEDURE — A9270 NON-COVERED ITEM OR SERVICE: HCPCS | Performed by: STUDENT IN AN ORGANIZED HEALTH CARE EDUCATION/TRAINING PROGRAM

## 2023-03-29 PROCEDURE — 80053 COMPREHEN METABOLIC PANEL: CPT

## 2023-03-29 PROCEDURE — 700102 HCHG RX REV CODE 250 W/ 637 OVERRIDE(OP): Performed by: RADIOLOGY

## 2023-03-29 PROCEDURE — 97165 OT EVAL LOW COMPLEX 30 MIN: CPT

## 2023-03-29 PROCEDURE — 99239 HOSP IP/OBS DSCHRG MGMT >30: CPT | Performed by: HOSPITALIST

## 2023-03-29 PROCEDURE — 700102 HCHG RX REV CODE 250 W/ 637 OVERRIDE(OP): Performed by: STUDENT IN AN ORGANIZED HEALTH CARE EDUCATION/TRAINING PROGRAM

## 2023-03-29 PROCEDURE — 84100 ASSAY OF PHOSPHORUS: CPT

## 2023-03-29 PROCEDURE — 97161 PT EVAL LOW COMPLEX 20 MIN: CPT

## 2023-03-29 PROCEDURE — 85027 COMPLETE CBC AUTOMATED: CPT

## 2023-03-29 PROCEDURE — A9270 NON-COVERED ITEM OR SERVICE: HCPCS | Performed by: RADIOLOGY

## 2023-03-29 PROCEDURE — 83880 ASSAY OF NATRIURETIC PEPTIDE: CPT

## 2023-03-29 RX ADMIN — CLOPIDOGREL BISULFATE 75 MG: 75 TABLET ORAL at 04:47

## 2023-03-29 RX ADMIN — UMECLIDINIUM BROMIDE AND VILANTEROL TRIFENATATE 1 PUFF: 62.5; 25 POWDER RESPIRATORY (INHALATION) at 10:24

## 2023-03-29 RX ADMIN — ASPIRIN 81 MG 81 MG: 81 TABLET ORAL at 04:47

## 2023-03-29 ASSESSMENT — COGNITIVE AND FUNCTIONAL STATUS - GENERAL
MOBILITY SCORE: 24
SUGGESTED CMS G CODE MODIFIER MOBILITY: CH
DAILY ACTIVITIY SCORE: 24
SUGGESTED CMS G CODE MODIFIER DAILY ACTIVITY: CH

## 2023-03-29 ASSESSMENT — ACTIVITIES OF DAILY LIVING (ADL): TOILETING: INDEPENDENT

## 2023-03-29 ASSESSMENT — GAIT ASSESSMENTS
DISTANCE (FEET): 300
GAIT LEVEL OF ASSIST: SUPERVISED
ASSISTIVE DEVICE: SINGLE POINT CANE

## 2023-03-29 NOTE — THERAPY
Occupational Therapy   Initial Evaluation     Patient Name: Natividad Sewell  Age:  68 y.o., Sex:  female  Medical Record #: [id number]  Today's Date: 3/29/2023     Precautions  Comments: (P) visual field cut    Assessment  Patient is 68 y.o. female who presents to acute s/p L carotid stent placement 3/27. Hx of R MCA CVA w/ upper field cut in Feb. Pt appears close to functional baseline performing BADLs at SPV level. Recommend DC home. No further acute OT needs noted at this time.    Plan         DC Equipment Recommendations: (P) None  Discharge Recommendations: (P) Anticipate that the patient will have no further occupational therapy needs after discharge from the hospital          Objective       03/29/23 1015   Charge Group   OT Evaluation OT Evaluation Low   Total Time Spent   OT Time Spent Yes   OT Evaluation (Minutes) 20   Initial Contact Note    Initial Contact Note Order Received and Verified, Occupational Therapy Evaluation in Progress with Full Report to Follow.   Prior Living Situation   Prior Services None   Housing / Facility 1 Story Apartment / Condo   Bathroom Set up Bathtub / Shower Combination   Equipment Owned Single Point Cane;Front-Wheel Walker   Lives with - Patient's Self Care Capacity Alone and Able to Care For Self   Comments Pt reports niece lives next door, ex boyfriend typically assists w/ grocery shopping and rides, son lives locally and can assist as needed   Prior Level of ADL Function   Self Feeding Independent   Grooming / Hygiene Independent   Bathing Independent   Dressing Independent   Toileting Independent   Prior Level of IADL Function   Medication Management Independent   Laundry Independent   Kitchen Mobility Independent   Finances Independent   Home Management Independent   Shopping Independent   Prior Level Of Mobility Independent With Device in Community;Independent Without Device in Home   Precautions   Comments visual field cut   Pain 0 - 10 Group   Therapist Pain  Assessment Post Activity Pain Same as Prior to Activity;Nurse Notified  (no c/o pain during session)   Cognition    Cognition / Consciousness WDL   Level of Consciousness Alert   Comments pleasent, cooperative, eager to return home   Active ROM Upper Body   Active ROM Upper Body  WDL   Strength Upper Body   Upper Body Strength  WDL   Coordination Upper Body   Coordination WDL   Balance Assessment   Sitting Balance (Static) Good   Sitting Balance (Dynamic) Good   Standing Balance (Static) Good   Standing Balance (Dynamic) Fair +   Weight Shift Sitting Good   Weight Shift Standing Fair   Comments w/ SPC   Bed Mobility    Supine to Sit Supervised   Sit to Supine   (NT in chair post)   Scooting Supervised   ADL Assessment   Grooming Supervision;Standing   Upper Body Dressing Supervision   Lower Body Dressing Supervision   Toileting Supervision   How much help from another person does the patient currently need...   Putting on and taking off regular lower body clothing? 4   Bathing (including washing, rinsing, and drying)? 4   Toileting, which includes using a toilet, bedpan, or urinal? 4   Putting on and taking off regular upper body clothing? 4   Taking care of personal grooming such as brushing teeth? 4   Eating meals? 4   6 Clicks Daily Activity Score 24   Functional Mobility   Sit to Stand Supervised   Bed, Chair, Wheelchair Transfer Supervised   Mobility household distance, w/ SPC   Activity Tolerance   Sitting in Chair 10+ min (up post)   Sitting Edge of Bed 5 min   Standing 8 min   Education Group   Role of Occupational Therapist Patient Response Patient;Acceptance;Explanation;Demonstration;Verbal Demonstration;Action Demonstration   Anticipated Discharge Equipment and Recommendations   DC Equipment Recommendations None   Discharge Recommendations Anticipate that the patient will have no further occupational therapy needs after discharge from the hospital   Interdisciplinary Plan of Care Collaboration   IDT  Collaboration with  Nursing;Physical Therapist   Patient Position at End of Therapy Seated;Call Light within Reach;Tray Table within Reach;Phone within Reach   Collaboration Comments report given   Session Information   Date / Session Number  3/29, 1x only

## 2023-03-29 NOTE — PROGRESS NOTES
Received report from NOC RN, pt care assumed. VS stable on RA. No signs of acute distress. Pt is aao4 and denies pain. Call light within reach. Home O2 eval completed. Pt is 97% on RA with ambulation. Meds to beds sent to [facility] on [address].

## 2023-03-29 NOTE — DISCHARGE INSTRUCTIONS
Angiogram, Angioplasty, or Stent Placement  Care After  One of the following procedures was done today.  ANGIOGRAM:  A catheter was placed through the blood vessel in your groin, contrast was injected into the vessels, and pictures were taken.  ANGIOPLASTY:  A catheter was placed through the blood vessel in your groin and directed to an area of blocked blood flow. A balloon, and possibly a metal stent were used to open the blockage. If no other blockages are present below this area, your symptoms should improve. If blockages are present below this area, surgery may still be necessary.  STENT:  A catheter was placed in your groin through which a metal mesh tube was placed in a narrowed part of the artery to facilitate blood flow.  You were given intravenous sedation. These medications are rapidly cleared from your bloodstream. You may feel some discomfort at the insertion site after the local anesthetic wears off. This discomfort should gradually improve over the next several days.  Only take over-the-counter or prescription medicines for pain, discomfort, or fever as directed by your caregiver.  Complications are very uncommon after this procedure. Go to the nearest emergency department if you develop any of the following symptoms:  Worsening pain.  Bleeding.  Swelling at the puncture site.  Lightheadedness.  Dizziness or fainting.  Fever or chills.  If oozing, bleeding, or a lump appears at the puncture site, apply firm pressure directly to the site steadily for 15 minutes and go to the emergency department.  Keep the skin around the insertion site dry. You may take showers after 24 hours. If the area does get wet, dry the skin completely. Avoid baths until the skin puncture site heals, usually 5 to 7 days.  Development of redness, increased soreness, or swelling may be signs of a skin infection. Contact your physician.  Rest for the remainder of the day and avoid any heavy lifting (more than 10 pounds or 4.5 kg).  Do not operate heavy machinery, drive, or make legal decisions for the first 24 hours after the procedure. Have a responsible person drive you home.  You may resume your usual diet after the procedure. Avoid alcoholic beverages for 24 hours after the procedure.  Document Released: 12/18/2006 Document Revised: 03/11/2013 Document Reviewed: 10/17/2007  Exit[employer]® Patient Information ©2014 [employer].         From a NeuroInterventional Service standpoint, OK to discharge to home when medically cleared by Hospitalist Service. Patient to follow up in our clinic in 2-6 weeks, our office to arrange appointment. Does need to continue aspirin and Plavix from our standpoint, with DAPT x 6 weeks, then resume monotherapy. She confirms she has both of these medications at home.     For femoral arterial access:  Post-angioseal instructions: no lifting greater than 5 lbs and no baths/ swimming/ soaking in tub for 5 days. Shower OK. OK to change dressings to band aid as needed. Then resume usual activity.

## 2023-03-29 NOTE — PROGRESS NOTES
Assumed care of pt and received report. Pt is A&O x 4, denies pain, bedside table and personal items within reach, bed locked in low position, pt on  saturating well, no additional needs at this time.

## 2023-03-29 NOTE — PROGRESS NOTES
D/c instructions given, educated on worsening s/s. Pt understands and questions answered. D/c home with daughter

## 2023-03-29 NOTE — THERAPY
Physical Therapy   Initial Evaluation     Patient Name: Natividad Sewell  Age:  68 y.o., Sex:  female  Medical Record #: [id number]  Today's Date: 3/29/2023    Assessment  Patient is a 68 y.o. female admitted with s/p L carotid stent placement 3/27, hx of R MCA CVA with upper field cut in February. Pt seen for PT evaluation at this time. Pt appears at her functional baseline and completed mobility without assist, ambulated with SPC without LOB. Pt reports no concerns with return home and appears functionally capable from PT standpoint. No further acute PT needs.     Plan  Evaluation only  DC Equipment Recommendations: None  Discharge Recommendations: Anticipate that the patient will have no further physical therapy needs after discharge from the hospital      03/29/23 1018   Prior Living Situation   Prior Services None   Housing / Facility 1 Story Apartment / Condo   Steps Into Home 0   Steps In Home 0   Bathroom Set up Bathtub / Shower Combination   Equipment Owned Single Point Cane;Front-Wheel Walker   Lives with -  Alone and Able to Care For Self   Comments reports niece lives next door, ex-SO takes her grocery shopping, local sons.   Prior Level of Functional Mobility   Bed Mobility Independent   Transfer Status Independent   Ambulation Independent   Ambulation Distance community distances   Assistive Devices Used Single Point Cane (as needed)   Cognition    Level of Consciousness Alert   Comments pleasant and eager to return home   Balance Assessment   Sitting Balance (Static) Good   Sitting Balance (Dynamic) Good   Standing Balance (Static) Good   Standing Balance (Dynamic) Fair +   Weight Shift Sitting Good   Weight Shift Standing Fair   Comments standing with SPC   Bed Mobility    Supine to Sit Supervised   Scooting Supervised   Gait Analysis   Gait Level Of Assist Supervised   Assistive Device Single Point Cane   Distance (Feet) 300   Weight Bearing Status no restrictions   Comments no LOB   Functional  Mobility   Sit to Stand Supervised   Bed, Chair, Wheelchair Transfer Supervised

## 2023-03-29 NOTE — DISCHARGE SUMMARY
Discharge Summary    CHIEF COMPLAINT ON ADMISSION  Elective admission for left carotid stent placement by interventional radiology    Reason for Admission  Occlusion and stenosis of left carotid artery    Admission Date  3/27/2023    CODE STATUS  Full Code    HPI & HOSPITAL COURSE  68 y.o. female who presented 3/27/2023 with a history of right MCA CVA in 2009, right-sided carotid artery stenosis, s/p CEA in 2009, s/p right carotid artery stent in 2020, ongoing tobacco abuse, recent admission secondary to right-sided vision loss and greater than 70% stenosis of the left ICA, chronic low back pain, using a cane for ambulation, extensive tobacco abuse history.  The patient underwent the left carotid stent placement on 3/27 without significant difficulty, she did have some difficulty with her oxygen saturations and required supplementation up to 4 L with exertion, the patient denies prior oxygen needs or difficulty with her breathing despite a 30+ pack year history of smoking.  She unfortunately continues to smoke a pack a day.  The patient denies chest pain, she has a left groin intervention site that is covered with a dressing, there is no pain or hematoma noted, the patient in addition had some difficulty with ambulation, weakness, noted by the nurse to have some urinary incontinence.  The patient therefore transferred to the medical unit for further optimization and weaning off oxygen.  The patient did well overnight prior to discharge, she was weaned off oxygen, she was ambulatory with her walker, evaluated by PT OT and found appropriate for discharge, the patient has all her medication available, she was otherwise discharged in medically improved condition for close outpatient follow-up with interventional radiology as well as her primary care provider.    Therefore, she is discharged in fair and stable condition to home with close outpatient follow-up.    The patient met 2-midnight criteria for an inpatient stay  "at the time of discharge.    Discharge Date  3/29/2023    FOLLOW UP ITEMS POST DISCHARGE  Follow-up with interventional radiology after stenting of her left carotid    DISCHARGE DIAGNOSES  Principal Problem:    Carotid stenosis, left POA: Yes  Active Problems:    Hypertension POA: Yes    Other hyperlipidemia POA: Yes    Cerebrovascular accident (CVA) due to occlusion of right middle cerebral artery (HCC) POA: Unknown    Tobacco use POA: Yes    Hypoxia POA: Unknown    Lumbar disc disease with radiculopathy POA: Unknown    Acute respiratory failure with hypoxia (HCC) POA: Unknown  Resolved Problems:    * No resolved hospital problems. *      FOLLOW UP  Future Appointments   Date Time Provider Department Center   5/10/2023  9:15 AM [facility] EXAM 10 ECHO [facility]   9/5/2023 11:15 AM Scott Anand M.D. CB None     No follow-up provider specified.    MEDICATIONS ON DISCHARGE     Medication List        START taking these medications        Instructions   umeclidinium-vilanterol 62.5-25 MCG/ACT Aepb inhaler  Commonly known as: Anoro Ellipta   Inhale 1 Puff every day.  Dose: 1 Puff            CONTINUE taking these medications        Instructions   Aspirin Low Dose 81 MG EC tablet  Generic drug: aspirin   Take 1 Tablet by mouth every day for 90 days.  Dose: 81 mg     clopidogrel 75 MG Tabs  Commonly known as: PLAVIX   Take 1 Tablet by mouth every day for 60 days. Start this medication 5 days prior to your stent procedure.  Dose: 75 mg            ASK your doctor about these medications        Instructions   rosuvastatin 40 MG tablet  Commonly known as: CRESTOR   Take 1 Tablet by mouth every day.  Dose: 40 mg              Allergies  Allergies   Allergen Reactions    Morphine      \"I don't know, it was during my recovery after the endarterectomy-they told me I had a bad reaction to it\"       DIET  Orders Placed This Encounter   Procedures    Diet Order Diet: Regular     Standing Status:   Standing     Number of " Occurrences:   1     Order Specific Question:   Diet:     Answer:   Regular [1]       ACTIVITY  As tolerated.  Weight bearing as tolerated    CONSULTATIONS  Critical care, interventional radiology    PROCEDURES  3/27, left carotid origin stent placement by interventional radiologist    LABORATORY  Lab Results   Component Value Date    SODIUM 138 03/29/2023    SODIUM 138 03/29/2023    POTASSIUM 4.0 03/29/2023    POTASSIUM 4.0 03/29/2023    CHLORIDE 104 03/29/2023    CHLORIDE 104 03/29/2023    CO2 22 03/29/2023    CO2 22 03/29/2023    GLUCOSE 113 (H) 03/29/2023    GLUCOSE 113 (H) 03/29/2023    BUN 14 03/29/2023    BUN 14 03/29/2023    CREATININE 0.55 03/29/2023    CREATININE 0.55 03/29/2023        Lab Results   Component Value Date    WBC 6.5 03/29/2023    HEMOGLOBIN 14.7 03/29/2023    HEMATOCRIT 44.9 03/29/2023    PLATELETCT 234 03/29/2023      The patient is instructed to take the medication as currently prescribed, continue her dual antiplatelet therapy and cholesterol medication.  Smoking cessation strongly advised, smoking cessation counseling for 10 minutes.    Total time of the discharge process to the extent of 45 minutes.

## 2023-03-29 NOTE — CARE PLAN
The patient is Stable - Low risk of patient condition declining or worsening    Shift Goals  Clinical Goals: Maintain adequate oxygen saturation this shift  Patient Goals: Comfort, rest  Family Goals: brian    Progress made toward(s) clinical / shift goals:  Pt maintained adequate oxygen saturations throughout the shift and pt rested throughout majority of the night.     Problem: Knowledge Deficit - Standard  Goal: Patient and family/care givers will demonstrate understanding of plan of care, disease process/condition, diagnostic tests and medications  Outcome: Progressing     Problem: Skin Integrity  Goal: Skin integrity is maintained or improved  Outcome: Progressing     Problem: Pain - Standard  Goal: Alleviation of pain or a reduction in pain to the patient’s comfort goal  Outcome: Progressing

## 2023-04-17 ENCOUNTER — NON-PROVIDER VISIT (OUTPATIENT)
Dept: CARDIOLOGY | Facility: MEDICAL CENTER | Age: 68
End: 2023-04-17
Payer: MEDICARE

## 2023-04-17 PROCEDURE — 93298 REM INTERROG DEV EVAL SCRMS: CPT | Performed by: INTERNAL MEDICINE

## 2023-04-17 NOTE — CARDIAC REMOTE MONITOR - SCAN
Device transmission reviewed. Device demonstrated appropriate function.       Electronically Signed by: Willie Hannah M.D.    4/18/2023  8:04 AM

## 2023-09-06 ENCOUNTER — TELEPHONE (OUTPATIENT)
Dept: CARDIOLOGY | Facility: MEDICAL CENTER | Age: 68
End: 2023-09-06
Payer: MEDICARE

## 2023-11-17 ENCOUNTER — TELEPHONE (OUTPATIENT)
Dept: CARDIOLOGY | Facility: MEDICAL CENTER | Age: 68
End: 2023-11-17
Payer: MEDICARE

## 2023-11-17 NOTE — TELEPHONE ENCOUNTER
Patient's monitor was having connection issues per [employer], called patient, no answer, unable to leave voicemail.

## 2024-01-08 ENCOUNTER — HOSPITAL ENCOUNTER (EMERGENCY)
Facility: MEDICAL CENTER | Age: 69
End: 2024-01-08
Payer: MEDICARE

## 2024-01-08 VITALS
RESPIRATION RATE: 16 BRPM | TEMPERATURE: 98.5 F | SYSTOLIC BLOOD PRESSURE: 156 MMHG | HEART RATE: 90 BPM | OXYGEN SATURATION: 96 % | DIASTOLIC BLOOD PRESSURE: 78 MMHG

## 2024-01-08 PROCEDURE — 15853 REMOVAL SUTR/STAPL XREQ ANES: CPT

## 2024-01-08 PROCEDURE — 99281 EMR DPT VST MAYX REQ PHY/QHP: CPT | Mod: EDC

## 2024-01-08 NOTE — ED NOTES
Patient had four sutures placed to her left forehead at a hospital in [place] 11 days ago. Sutures removed without difficulty. Wound scabbed and well approximated.  Encouraged patient to follow up with PCP.

## 2024-01-10 ENCOUNTER — HOSPITAL ENCOUNTER (EMERGENCY)
Facility: MEDICAL CENTER | Age: 69
End: 2024-01-10
Attending: EMERGENCY MEDICINE
Payer: MEDICARE

## 2024-01-10 ENCOUNTER — APPOINTMENT (OUTPATIENT)
Dept: RADIOLOGY | Facility: MEDICAL CENTER | Age: 69
End: 2024-01-10
Attending: EMERGENCY MEDICINE
Payer: MEDICARE

## 2024-01-10 VITALS
HEART RATE: 62 BPM | HEIGHT: 64 IN | TEMPERATURE: 97.6 F | SYSTOLIC BLOOD PRESSURE: 222 MMHG | DIASTOLIC BLOOD PRESSURE: 93 MMHG | RESPIRATION RATE: 15 BRPM | WEIGHT: 175.71 LBS | OXYGEN SATURATION: 93 % | BODY MASS INDEX: 30 KG/M2

## 2024-01-10 DIAGNOSIS — I15.9 SECONDARY HYPERTENSION: ICD-10-CM

## 2024-01-10 DIAGNOSIS — S06.0X9A CONCUSSION WITH LOSS OF CONSCIOUSNESS, INITIAL ENCOUNTER: ICD-10-CM

## 2024-01-10 DIAGNOSIS — R51.9 NONINTRACTABLE HEADACHE, UNSPECIFIED CHRONICITY PATTERN, UNSPECIFIED HEADACHE TYPE: ICD-10-CM

## 2024-01-10 LAB
ANION GAP SERPL CALC-SCNC: 12 MMOL/L (ref 7–16)
BASOPHILS # BLD AUTO: 0.7 % (ref 0–1.8)
BASOPHILS # BLD: 0.04 K/UL (ref 0–0.12)
BUN SERPL-MCNC: 10 MG/DL (ref 8–22)
CALCIUM SERPL-MCNC: 9 MG/DL (ref 8.5–10.5)
CHLORIDE SERPL-SCNC: 106 MMOL/L (ref 96–112)
CO2 SERPL-SCNC: 23 MMOL/L (ref 20–33)
CREAT SERPL-MCNC: 0.49 MG/DL (ref 0.5–1.4)
EKG IMPRESSION: NORMAL
EOSINOPHIL # BLD AUTO: 0.18 K/UL (ref 0–0.51)
EOSINOPHIL NFR BLD: 3 % (ref 0–6.9)
ERYTHROCYTE [DISTWIDTH] IN BLOOD BY AUTOMATED COUNT: 46.1 FL (ref 35.9–50)
GFR SERPLBLD CREATININE-BSD FMLA CKD-EPI: 103 ML/MIN/1.73 M 2
GLUCOSE SERPL-MCNC: 104 MG/DL (ref 65–99)
HCT VFR BLD AUTO: 45.9 % (ref 37–47)
HGB BLD-MCNC: 14.9 G/DL (ref 12–16)
IMM GRANULOCYTES # BLD AUTO: 0.01 K/UL (ref 0–0.11)
IMM GRANULOCYTES NFR BLD AUTO: 0.2 % (ref 0–0.9)
LYMPHOCYTES # BLD AUTO: 1.5 K/UL (ref 1–4.8)
LYMPHOCYTES NFR BLD: 24.8 % (ref 22–41)
MCH RBC QN AUTO: 29.2 PG (ref 27–33)
MCHC RBC AUTO-ENTMCNC: 32.5 G/DL (ref 32.2–35.5)
MCV RBC AUTO: 89.8 FL (ref 81.4–97.8)
MONOCYTES # BLD AUTO: 0.45 K/UL (ref 0–0.85)
MONOCYTES NFR BLD AUTO: 7.5 % (ref 0–13.4)
NEUTROPHILS # BLD AUTO: 3.86 K/UL (ref 1.82–7.42)
NEUTROPHILS NFR BLD: 63.8 % (ref 44–72)
NRBC # BLD AUTO: 0 K/UL
NRBC BLD-RTO: 0 /100 WBC (ref 0–0.2)
PLATELET # BLD AUTO: 275 K/UL (ref 164–446)
PMV BLD AUTO: 9.7 FL (ref 9–12.9)
POTASSIUM SERPL-SCNC: 4.1 MMOL/L (ref 3.6–5.5)
RBC # BLD AUTO: 5.11 M/UL (ref 4.2–5.4)
SODIUM SERPL-SCNC: 141 MMOL/L (ref 135–145)
WBC # BLD AUTO: 6 K/UL (ref 4.8–10.8)

## 2024-01-10 PROCEDURE — 85025 COMPLETE CBC W/AUTO DIFF WBC: CPT

## 2024-01-10 PROCEDURE — 700111 HCHG RX REV CODE 636 W/ 250 OVERRIDE (IP): Performed by: EMERGENCY MEDICINE

## 2024-01-10 PROCEDURE — 80048 BASIC METABOLIC PNL TOTAL CA: CPT

## 2024-01-10 PROCEDURE — 99285 EMERGENCY DEPT VISIT HI MDM: CPT

## 2024-01-10 PROCEDURE — 36415 COLL VENOUS BLD VENIPUNCTURE: CPT

## 2024-01-10 PROCEDURE — 70450 CT HEAD/BRAIN W/O DYE: CPT

## 2024-01-10 PROCEDURE — 96374 THER/PROPH/DIAG INJ IV PUSH: CPT

## 2024-01-10 PROCEDURE — 93005 ELECTROCARDIOGRAM TRACING: CPT | Performed by: EMERGENCY MEDICINE

## 2024-01-10 RX ORDER — PROCHLORPERAZINE MALEATE 10 MG
10 TABLET ORAL EVERY 6 HOURS PRN
Qty: 30 TABLET | Refills: 3 | Status: SHIPPED | OUTPATIENT
Start: 2024-01-10

## 2024-01-10 RX ORDER — PROCHLORPERAZINE EDISYLATE 5 MG/ML
10 INJECTION INTRAMUSCULAR; INTRAVENOUS ONCE
Status: COMPLETED | OUTPATIENT
Start: 2024-01-10 | End: 2024-01-10

## 2024-01-10 RX ADMIN — PROCHLORPERAZINE EDISYLATE 10 MG: 5 INJECTION INTRAMUSCULAR; INTRAVENOUS at 12:13

## 2024-01-10 ASSESSMENT — FIBROSIS 4 INDEX: FIB4 SCORE: 1.12

## 2024-01-10 ASSESSMENT — PAIN DESCRIPTION - PAIN TYPE: TYPE: ACUTE PAIN

## 2024-01-10 NOTE — ED TRIAGE NOTES
".  Chief Complaint   Patient presents with    Headache     Intermittent, takes Excedrin in the morning and HA goes away but returns the following a.m.      Pt ambulate to triage with above complaint. Pt reports GLF just after Bruce, seen in local ER, sustained a \"brain bleed\". Pt prescribed Kepra and Hydrocodone and was to follow up with PCP. Pt does not have PCP, c/o intermittent HA. Finished prescribed medications.    Pt also notes that she was told she had narrowing of her left carotid artery, may have had a stent placed.   Pt educated on triage process and returned to lobby.    "

## 2024-01-10 NOTE — ED PROVIDER NOTES
ER Provider Note    Scribed for Dr. Toby Najera M.D. by Jaye Wynne. 1/10/2024  11:40 AM    Primary Care Provider: Pcp Pt States None    CHIEF COMPLAINT  Chief Complaint   Patient presents with    Headache     Intermittent, takes Excedrin in the morning and HA goes away but returns the following a.m.       EXTERNAL RECORDS REVIEWED  Inpatient Notes The patient was  admitted for carotid stenosis march 2023     HPI/ROS    Natividad Sewell is a 68 y.o. female who presents to the ED for an intermittent headache onset this morning. The patient reports that she fell and hit her head while in [facility] resulting in a brain bleed around Bruce time. The patient was given medications and discharged from hospital. This morning she began to experience worsening headache prompting her to present to the ED. There are no known alleviating or exacerbating factors. The patient adds that she has a history of carotid stent placement. She is allergic to morphine.    PAST MEDICAL HISTORY  Past Medical History:   Diagnosis Date    Arthritis     Dental disorder 03/16/2023    upper/lowers full plates    High cholesterol 03/16/2023    medicated    Hypertension     pt denies- not taking any medication currently    Migraines, neuralgic     Mitral valve prolapse     Stroke (HCC) 06/04/2009    no residual weaknesses or problems    Stroke (HCC) 03/16/2023    right eye stroke in 2/13/23       SURGICAL HISTORY  Past Surgical History:   Procedure Laterality Date    CAROTID ENDARTERECTOMY  6/6/2009    Performed by ALCIDES MERINO at [facility]    GYN SURGERY Bilateral     tubal ligation       FAMILY HISTORY  History reviewed. No pertinent family history.    SOCIAL HISTORY   reports that she has been smoking cigarettes. She started smoking about 43 years ago. She has a 43.3 pack-year smoking history. She has never used smokeless tobacco. She reports current drug use. Drug: Inhaled. She reports that she does not drink  "alcohol.    CURRENT MEDICATIONS  Previous Medications    ROSUVASTATIN (CRESTOR) 40 MG TABLET    Take 1 Tablet by mouth every day.    UMECLIDINIUM-VILANTEROL (ANORO ELLIPTA) 62.5-25 MCG/ACT AEROSOL POWDER, BREATH ACTIVATED INHALER    Inhale 1 Puff every day.       ALLERGIES  Morphine    PHYSICAL EXAM  BP (!) 154/90   Pulse 80   Temp (!) 35.8 °C (96.4 °F) (Temporal)   Resp 20   Ht 1.626 m (5' 4\")   Wt 79.7 kg (175 lb 11.3 oz)   LMP 09/16/2009   SpO2 97%   BMI 30.16 kg/m²   Constitutional: Alert in no apparent distress.  HENT: No signs of trauma, Bilateral external ears normal, Nose normal.   Eyes: Pupils are equal and reactive, Conjunctiva normal, Non-icteric.   Neck: Normal range of motion, No tenderness, Supple,   Cardiovascular: Regular rate and rhythm, no murmurs.   Thorax & Lungs: Normal breath sounds, No respiratory distress, No wheezing, No chest tenderness.   Abdomen: Bowel sounds normal, Soft, No tenderness, No masses, No pulsatile masses. No peritoneal signs.  Skin: Warm, Dry, No erythema, No rash.   Back: No bony tenderness, No CVA tenderness.   Extremities: Intact distal pulses, No edema, No tenderness, No cyanosis, no tenderness  Neurologic: Alert , Normal motor function, Normal sensory function, No focal deficits noted.   Psychiatric: Affect normal     DIAGNOSTIC STUDIES & PROCEDURES    Labs:   Labs Reviewed   BASIC METABOLIC PANEL - Abnormal; Notable for the following components:       Result Value    Glucose 104 (*)     Creatinine 0.49 (*)     All other components within normal limits   CBC WITH DIFFERENTIAL   ESTIMATED GFR    All labs reviewed by me.    EKG Interpretation:  Interpreted by me    12 Lead EKG interpreted by me to show:  Normal sinus rhythm  Rate 64  Axis: Normal  Intervals: Normal  Normal T waves  Normal ST segments  My impression of this EKG: Does not indicate ischemia or arrhythmia at this time.     Radiology:   The attending Emergency Physician has independently interpreted " the diagnostic imaging associated with this visit and is awaiting the final reading from the radiologist, which will be displayed below.    Preliminary interpretation is a follows: Encephalomalacia but no acute bleed.  Radiologist interpretation:      CT-HEAD W/O   Final Result      1.  No acute intracranial abnormality.   2.  RIGHT MCA territory encephalomalacia redemonstrated   3.  Mild atrophy   4.  Mild white matter changes                 COURSE & MEDICAL DECISION MAKING    ED Observation Status? No; Patient does not meet criteria for ED Observation.     INITIAL ASSESSMENT AND PLAN  Care Narrative:       11:40 AM - Patient seen and evaluated at bedside. Discussed plan of care, including labs and imaging. Patient agrees to plan of care. Patient will be treated with 10 mg Compazine for her symptoms. Ordered CT- Head w/o, CBC with diff, and BMP to evaluate.    1:46 PM - Patient reevaluated at bedside. She feels improved. Discussed plan for discharge, including plan for follow-up, and informed them to return to the [facility] with any new or worsening symptoms. Patient was given the opportunity for questions, and I addressed all questions or concerns. She is stable for discharge at this time. Patient verbalizes understanding and support with my plan for discharge.     HTN/IDDM FOLLOW UP:  The patient is referred to a primary physician for blood pressure management, diabetic screening, and for all other preventive health concerns    ADDITIONAL PROBLEM LIST AND DISPOSITION  Patient with headaches.  At this time I believe is postconcussive.  Ultimately after medication her headaches have resolved.  She has been much improved.  She does have elevated blood pressure which she has intermittently.  I did have a discussion with her about blood pressure medication and follow-up with her primary care physician.  Did have lower blood pressures when she first arrived.  She is asymptomatic at this time.  Do not want to acutely  lower her blood pressure at home.  I will have her follow-up with primary care physician discussed this.  Strict return precautions were given and follow-up was arranged.               DISPOSITION AND DISCUSSIONS  I have discussed management of the patient with the following physicians and CLAUDE's: None    Discussion of management with other [facility] or appropriate source(s): None     Escalation of care considered, and ultimately not performed: acute inpatient care management, however at this time, the patient is most appropriate for outpatient management.    Barriers to care at this time, including but not limited to: Patient does not have established PCP.     Decision tools and prescription drugs considered including, but not limited to: Medication modification no blood manage pressure medications were added on at this time but she is instructed to follow-up with her primary care physician to discuss blood pressure elevations and to use a blood pressure cuff at home. .    The patient will return for new or worsening symptoms and is stable at the time of discharge.    The patient is referred to a primary physician for blood pressure management, diabetic screening, and for all other preventative health concerns.    DISPOSITION:  Patient will be discharged home in stable condition.    FOLLOW UP:  [facility]  [address] [place] [id number]  [phone]  In 2 days        OUTPATIENT MEDICATIONS:  New Prescriptions    PROCHLORPERAZINE (COMPAZINE) 10 MG TAB    Take 1 Tablet by mouth every 6 hours as needed (headache).        FINAL IMPRESSION   1. Concussion with loss of consciousness, initial encounter    2. Nonintractable headache, unspecified chronicity pattern, unspecified headache type         I, Jaye Wynne (Hemant), am scribing for, and in the presence of, Toby Najera M.D..    Electronically signed by: Jaye Wynne (Hemant), 1/10/2024    IToby M.D. personally performed  the services described in this documentation, as scribed by Jaye Wynne in my presence, and it is both accurate and complete.    The note accurately reflects work and decisions made by me.  Toby Najera M.D.  1/10/2024  2:16 PM

## 2024-08-27 ENCOUNTER — TELEPHONE (OUTPATIENT)
Dept: CARDIOLOGY | Facility: MEDICAL CENTER | Age: 69
End: 2024-08-27
Payer: MEDICARE

## 2024-08-27 NOTE — TELEPHONE ENCOUNTER
Last loop recorder transmission 4/26/23-- several attempts to contact patient regarding home monitoring connection.

## 2025-06-28 ENCOUNTER — APPOINTMENT (OUTPATIENT)
Dept: RADIOLOGY | Facility: MEDICAL CENTER | Age: 70
DRG: 690 | End: 2025-06-28
Attending: EMERGENCY MEDICINE
Payer: MEDICARE

## 2025-06-28 ENCOUNTER — HOSPITAL ENCOUNTER (INPATIENT)
Facility: MEDICAL CENTER | Age: 70
End: 2025-06-28
Attending: EMERGENCY MEDICINE | Admitting: STUDENT IN AN ORGANIZED HEALTH CARE EDUCATION/TRAINING PROGRAM
Payer: MEDICARE

## 2025-06-28 DIAGNOSIS — I71.43 INFRARENAL ABDOMINAL AORTIC ANEURYSM (AAA) WITHOUT RUPTURE (HCC): ICD-10-CM

## 2025-06-28 DIAGNOSIS — E78.00 PURE HYPERCHOLESTEROLEMIA: ICD-10-CM

## 2025-06-28 DIAGNOSIS — R11.2 NAUSEA AND VOMITING, UNSPECIFIED VOMITING TYPE: ICD-10-CM

## 2025-06-28 DIAGNOSIS — I63.9 CEREBROVASCULAR ACCIDENT (CVA), UNSPECIFIED MECHANISM (HCC): ICD-10-CM

## 2025-06-28 DIAGNOSIS — J96.01 ACUTE RESPIRATORY FAILURE WITH HYPOXIA (HCC): ICD-10-CM

## 2025-06-28 DIAGNOSIS — E87.1 HYPONATREMIA: ICD-10-CM

## 2025-06-28 DIAGNOSIS — R78.81 BACTEREMIA: ICD-10-CM

## 2025-06-28 DIAGNOSIS — J44.1 ACUTE EXACERBATION OF CHRONIC OBSTRUCTIVE PULMONARY DISEASE (COPD) (HCC): ICD-10-CM

## 2025-06-28 DIAGNOSIS — Z72.0 TOBACCO USE: ICD-10-CM

## 2025-06-28 DIAGNOSIS — N12 PYELONEPHRITIS: Primary | ICD-10-CM

## 2025-06-28 PROBLEM — I71.40 AAA (ABDOMINAL AORTIC ANEURYSM) (HCC): Status: ACTIVE | Noted: 2025-06-28

## 2025-06-28 PROBLEM — D72.829 LEUKOCYTOSIS: Status: ACTIVE | Noted: 2025-06-28

## 2025-06-28 PROBLEM — E87.6 HYPOKALEMIA: Status: ACTIVE | Noted: 2025-06-28

## 2025-06-28 PROBLEM — K76.9 HEPATIC LESION: Status: ACTIVE | Noted: 2025-06-28

## 2025-06-28 PROBLEM — N10 ACUTE PYELONEPHRITIS: Status: ACTIVE | Noted: 2025-06-28

## 2025-06-28 PROBLEM — R79.89 ELEVATED TROPONIN: Status: ACTIVE | Noted: 2025-06-28

## 2025-06-28 LAB
ALBUMIN SERPL BCP-MCNC: 3.5 G/DL (ref 3.2–4.9)
ALBUMIN/GLOB SERPL: 1 G/DL
ALP SERPL-CCNC: 141 U/L (ref 30–99)
ALT SERPL-CCNC: 11 U/L (ref 2–50)
ANION GAP SERPL CALC-SCNC: 15 MMOL/L (ref 7–16)
APPEARANCE UR: ABNORMAL
AST SERPL-CCNC: 23 U/L (ref 12–45)
BACTERIA #/AREA URNS HPF: ABNORMAL /HPF
BASOPHILS # BLD AUTO: 0.2 % (ref 0–1.8)
BASOPHILS # BLD: 0.03 K/UL (ref 0–0.12)
BILIRUB SERPL-MCNC: 1.3 MG/DL (ref 0.1–1.5)
BILIRUB UR QL STRIP.AUTO: NEGATIVE
BUN SERPL-MCNC: 13 MG/DL (ref 8–22)
CALCIUM ALBUM COR SERPL-MCNC: 9 MG/DL (ref 8.5–10.5)
CALCIUM SERPL-MCNC: 8.6 MG/DL (ref 8.5–10.5)
CASTS URNS QL MICRO: ABNORMAL /LPF (ref 0–2)
CHLORIDE SERPL-SCNC: 91 MMOL/L (ref 96–112)
CO2 SERPL-SCNC: 20 MMOL/L (ref 20–33)
COLOR UR: YELLOW
CREAT SERPL-MCNC: 0.82 MG/DL (ref 0.5–1.4)
EKG IMPRESSION: NORMAL
EOSINOPHIL # BLD AUTO: 0.25 K/UL (ref 0–0.51)
EOSINOPHIL NFR BLD: 1.9 % (ref 0–6.9)
EPITHELIAL CELLS 1715: ABNORMAL /HPF (ref 0–5)
ERYTHROCYTE [DISTWIDTH] IN BLOOD BY AUTOMATED COUNT: 45.1 FL (ref 35.9–50)
EST. AVERAGE GLUCOSE BLD GHB EST-MCNC: 117 MG/DL
GFR SERPLBLD CREATININE-BSD FMLA CKD-EPI: 77 ML/MIN/1.73 M 2
GLOBULIN SER CALC-MCNC: 3.6 G/DL (ref 1.9–3.5)
GLUCOSE SERPL-MCNC: 138 MG/DL (ref 65–99)
GLUCOSE UR STRIP.AUTO-MCNC: NEGATIVE MG/DL
HBA1C MFR BLD: 5.7 % (ref 4–5.6)
HCT VFR BLD AUTO: 43.2 % (ref 37–47)
HGB BLD-MCNC: 14.5 G/DL (ref 12–16)
IMM GRANULOCYTES # BLD AUTO: 0.06 K/UL (ref 0–0.11)
IMM GRANULOCYTES NFR BLD AUTO: 0.5 % (ref 0–0.9)
KETONES UR STRIP.AUTO-MCNC: 15 MG/DL
LACTATE SERPL-SCNC: 1 MMOL/L (ref 0.5–2)
LEUKOCYTE ESTERASE UR QL STRIP.AUTO: ABNORMAL
LIPASE SERPL-CCNC: 11 U/L (ref 11–82)
LYMPHOCYTES # BLD AUTO: 0.49 K/UL (ref 1–4.8)
LYMPHOCYTES NFR BLD: 3.8 % (ref 22–41)
MCH RBC QN AUTO: 28.8 PG (ref 27–33)
MCHC RBC AUTO-ENTMCNC: 33.6 G/DL (ref 32.2–35.5)
MCV RBC AUTO: 85.7 FL (ref 81.4–97.8)
MICRO URNS: ABNORMAL
MONOCYTES # BLD AUTO: 0.71 K/UL (ref 0–0.85)
MONOCYTES NFR BLD AUTO: 5.5 % (ref 0–13.4)
NEUTROPHILS # BLD AUTO: 11.43 K/UL (ref 1.82–7.42)
NEUTROPHILS NFR BLD: 88.1 % (ref 44–72)
NITRITE UR QL STRIP.AUTO: POSITIVE
NRBC # BLD AUTO: 0 K/UL
NRBC BLD-RTO: 0 /100 WBC (ref 0–0.2)
NT-PROBNP SERPL IA-MCNC: ABNORMAL PG/ML (ref 0–125)
PH UR STRIP.AUTO: 5.5 [PH] (ref 5–8)
PLATELET # BLD AUTO: 205 K/UL (ref 164–446)
PMV BLD AUTO: 10.1 FL (ref 9–12.9)
POTASSIUM SERPL-SCNC: 3.5 MMOL/L (ref 3.6–5.5)
PROT SERPL-MCNC: 7.1 G/DL (ref 6–8.2)
PROT UR QL STRIP: 300 MG/DL
RBC # BLD AUTO: 5.04 M/UL (ref 4.2–5.4)
RBC # URNS HPF: ABNORMAL /HPF (ref 0–2)
RBC UR QL AUTO: ABNORMAL
SODIUM SERPL-SCNC: 126 MMOL/L (ref 135–145)
SP GR UR STRIP.AUTO: 1.01
TROPONIN T SERPL-MCNC: 36 NG/L (ref 6–19)
TROPONIN T SERPL-MCNC: 41 NG/L (ref 6–19)
UROBILINOGEN UR STRIP.AUTO-MCNC: 1 EU/DL
WBC # BLD AUTO: 13 K/UL (ref 4.8–10.8)
WBC #/AREA URNS HPF: >100 /HPF

## 2025-06-28 PROCEDURE — 770020 HCHG ROOM/CARE - TELE (206)

## 2025-06-28 PROCEDURE — A9270 NON-COVERED ITEM OR SERVICE: HCPCS | Performed by: STUDENT IN AN ORGANIZED HEALTH CARE EDUCATION/TRAINING PROGRAM

## 2025-06-28 PROCEDURE — 87086 URINE CULTURE/COLONY COUNT: CPT

## 2025-06-28 PROCEDURE — 700102 HCHG RX REV CODE 250 W/ 637 OVERRIDE(OP): Performed by: STUDENT IN AN ORGANIZED HEALTH CARE EDUCATION/TRAINING PROGRAM

## 2025-06-28 PROCEDURE — 99406 BEHAV CHNG SMOKING 3-10 MIN: CPT | Performed by: STUDENT IN AN ORGANIZED HEALTH CARE EDUCATION/TRAINING PROGRAM

## 2025-06-28 PROCEDURE — 96374 THER/PROPH/DIAG INJ IV PUSH: CPT

## 2025-06-28 PROCEDURE — 80053 COMPREHEN METABOLIC PANEL: CPT

## 2025-06-28 PROCEDURE — 74177 CT ABD & PELVIS W/CONTRAST: CPT

## 2025-06-28 PROCEDURE — 99285 EMERGENCY DEPT VISIT HI MDM: CPT

## 2025-06-28 PROCEDURE — 81001 URINALYSIS AUTO W/SCOPE: CPT

## 2025-06-28 PROCEDURE — 700111 HCHG RX REV CODE 636 W/ 250 OVERRIDE (IP): Performed by: STUDENT IN AN ORGANIZED HEALTH CARE EDUCATION/TRAINING PROGRAM

## 2025-06-28 PROCEDURE — 71045 X-RAY EXAM CHEST 1 VIEW: CPT

## 2025-06-28 PROCEDURE — 87186 SC STD MICRODIL/AGAR DIL: CPT

## 2025-06-28 PROCEDURE — 700101 HCHG RX REV CODE 250: Performed by: EMERGENCY MEDICINE

## 2025-06-28 PROCEDURE — 83605 ASSAY OF LACTIC ACID: CPT

## 2025-06-28 PROCEDURE — 700105 HCHG RX REV CODE 258: Performed by: STUDENT IN AN ORGANIZED HEALTH CARE EDUCATION/TRAINING PROGRAM

## 2025-06-28 PROCEDURE — 87077 CULTURE AEROBIC IDENTIFY: CPT

## 2025-06-28 PROCEDURE — 83036 HEMOGLOBIN GLYCOSYLATED A1C: CPT

## 2025-06-28 PROCEDURE — 84484 ASSAY OF TROPONIN QUANT: CPT

## 2025-06-28 PROCEDURE — 85025 COMPLETE CBC W/AUTO DIFF WBC: CPT

## 2025-06-28 PROCEDURE — 700111 HCHG RX REV CODE 636 W/ 250 OVERRIDE (IP): Mod: JZ,TB | Performed by: EMERGENCY MEDICINE

## 2025-06-28 PROCEDURE — 93005 ELECTROCARDIOGRAM TRACING: CPT | Mod: TC | Performed by: EMERGENCY MEDICINE

## 2025-06-28 PROCEDURE — 700117 HCHG RX CONTRAST REV CODE 255: Performed by: EMERGENCY MEDICINE

## 2025-06-28 PROCEDURE — 36415 COLL VENOUS BLD VENIPUNCTURE: CPT

## 2025-06-28 PROCEDURE — 94640 AIRWAY INHALATION TREATMENT: CPT

## 2025-06-28 PROCEDURE — 87015 SPECIMEN INFECT AGNT CONCNTJ: CPT

## 2025-06-28 PROCEDURE — 700111 HCHG RX REV CODE 636 W/ 250 OVERRIDE (IP)

## 2025-06-28 PROCEDURE — 87040 BLOOD CULTURE FOR BACTERIA: CPT

## 2025-06-28 PROCEDURE — 99223 1ST HOSP IP/OBS HIGH 75: CPT | Mod: 25,AI | Performed by: STUDENT IN AN ORGANIZED HEALTH CARE EDUCATION/TRAINING PROGRAM

## 2025-06-28 PROCEDURE — 83690 ASSAY OF LIPASE: CPT

## 2025-06-28 PROCEDURE — 83880 ASSAY OF NATRIURETIC PEPTIDE: CPT

## 2025-06-28 RX ORDER — ROSUVASTATIN CALCIUM 20 MG/1
40 TABLET, COATED ORAL DAILY
Status: DISCONTINUED | OUTPATIENT
Start: 2025-06-28 | End: 2025-07-01 | Stop reason: HOSPADM

## 2025-06-28 RX ORDER — UMECLIDINIUM BROMIDE AND VILANTEROL TRIFENATATE 62.5; 25 UG/1; UG/1
1 POWDER RESPIRATORY (INHALATION)
Status: DISCONTINUED | OUTPATIENT
Start: 2025-06-28 | End: 2025-07-01 | Stop reason: HOSPADM

## 2025-06-28 RX ORDER — ONDANSETRON 2 MG/ML
4 INJECTION INTRAMUSCULAR; INTRAVENOUS EVERY 4 HOURS PRN
Status: DISCONTINUED | OUTPATIENT
Start: 2025-06-28 | End: 2025-07-01 | Stop reason: HOSPADM

## 2025-06-28 RX ORDER — CEFAZOLIN 2 G/1
2 INJECTION, POWDER, FOR SOLUTION INTRAMUSCULAR; INTRAVENOUS ONCE
Status: COMPLETED | OUTPATIENT
Start: 2025-06-28 | End: 2025-06-28

## 2025-06-28 RX ORDER — ONDANSETRON 4 MG/1
4 TABLET, ORALLY DISINTEGRATING ORAL ONCE
Status: COMPLETED | OUTPATIENT
Start: 2025-06-28 | End: 2025-06-28

## 2025-06-28 RX ORDER — POLYETHYLENE GLYCOL 3350 17 G/17G
1 POWDER, FOR SOLUTION ORAL
Status: DISCONTINUED | OUTPATIENT
Start: 2025-06-28 | End: 2025-07-01 | Stop reason: HOSPADM

## 2025-06-28 RX ORDER — ONDANSETRON 4 MG/1
4 TABLET, ORALLY DISINTEGRATING ORAL EVERY 4 HOURS PRN
Status: DISCONTINUED | OUTPATIENT
Start: 2025-06-28 | End: 2025-07-01 | Stop reason: HOSPADM

## 2025-06-28 RX ORDER — IPRATROPIUM BROMIDE AND ALBUTEROL SULFATE 2.5; .5 MG/3ML; MG/3ML
3 SOLUTION RESPIRATORY (INHALATION)
Status: COMPLETED | OUTPATIENT
Start: 2025-06-28 | End: 2025-06-28

## 2025-06-28 RX ORDER — SODIUM CHLORIDE 9 MG/ML
INJECTION, SOLUTION INTRAVENOUS CONTINUOUS
Status: ACTIVE | OUTPATIENT
Start: 2025-06-28 | End: 2025-06-29

## 2025-06-28 RX ORDER — POTASSIUM CHLORIDE 7.45 MG/ML
10 INJECTION INTRAVENOUS ONCE
Status: COMPLETED | OUTPATIENT
Start: 2025-06-28 | End: 2025-06-28

## 2025-06-28 RX ORDER — HYDRALAZINE HYDROCHLORIDE 20 MG/ML
10 INJECTION INTRAMUSCULAR; INTRAVENOUS EVERY 4 HOURS PRN
Status: DISCONTINUED | OUTPATIENT
Start: 2025-06-28 | End: 2025-07-01 | Stop reason: HOSPADM

## 2025-06-28 RX ORDER — AMOXICILLIN 250 MG
2 CAPSULE ORAL EVERY EVENING
Status: DISCONTINUED | OUTPATIENT
Start: 2025-06-28 | End: 2025-07-01 | Stop reason: HOSPADM

## 2025-06-28 RX ORDER — ACETAMINOPHEN 325 MG/1
650 TABLET ORAL EVERY 6 HOURS PRN
Status: DISCONTINUED | OUTPATIENT
Start: 2025-06-28 | End: 2025-07-01 | Stop reason: HOSPADM

## 2025-06-28 RX ORDER — POTASSIUM CHLORIDE 1500 MG/1
40 TABLET, EXTENDED RELEASE ORAL ONCE
Status: COMPLETED | OUTPATIENT
Start: 2025-06-28 | End: 2025-06-28

## 2025-06-28 RX ORDER — ENOXAPARIN SODIUM 100 MG/ML
40 INJECTION SUBCUTANEOUS DAILY
Status: DISCONTINUED | OUTPATIENT
Start: 2025-06-28 | End: 2025-07-01 | Stop reason: HOSPADM

## 2025-06-28 RX ORDER — METHYLPREDNISOLONE SODIUM SUCCINATE 125 MG/2ML
125 INJECTION, POWDER, LYOPHILIZED, FOR SOLUTION INTRAMUSCULAR; INTRAVENOUS ONCE
Status: COMPLETED | OUTPATIENT
Start: 2025-06-28 | End: 2025-06-28

## 2025-06-28 RX ADMIN — POTASSIUM CHLORIDE 10 MEQ: 7.46 INJECTION, SOLUTION INTRAVENOUS at 17:32

## 2025-06-28 RX ADMIN — IPRATROPIUM BROMIDE AND ALBUTEROL SULFATE 3 ML: .5; 2.5 SOLUTION RESPIRATORY (INHALATION) at 14:22

## 2025-06-28 RX ADMIN — ROSUVASTATIN CALCIUM 40 MG: 20 TABLET, FILM COATED ORAL at 17:25

## 2025-06-28 RX ADMIN — CEFTRIAXONE SODIUM 2000 MG: 10 INJECTION, POWDER, FOR SOLUTION INTRAVENOUS at 21:09

## 2025-06-28 RX ADMIN — ONDANSETRON 4 MG: 4 TABLET, ORALLY DISINTEGRATING ORAL at 12:12

## 2025-06-28 RX ADMIN — IOHEXOL 98 ML: 350 INJECTION, SOLUTION INTRAVENOUS at 15:30

## 2025-06-28 RX ADMIN — SODIUM CHLORIDE: 9 INJECTION, SOLUTION INTRAVENOUS at 17:30

## 2025-06-28 RX ADMIN — CEFAZOLIN 2 G: 2 INJECTION, POWDER, FOR SOLUTION INTRAVENOUS at 14:18

## 2025-06-28 RX ADMIN — POTASSIUM CHLORIDE 40 MEQ: 1500 TABLET, EXTENDED RELEASE ORAL at 17:26

## 2025-06-28 RX ADMIN — ONDANSETRON 4 MG: 2 INJECTION INTRAMUSCULAR; INTRAVENOUS at 17:48

## 2025-06-28 RX ADMIN — METHYLPREDNISOLONE SODIUM SUCCINATE 125 MG: 125 INJECTION, POWDER, FOR SOLUTION INTRAMUSCULAR; INTRAVENOUS at 17:25

## 2025-06-28 RX ADMIN — ACETAMINOPHEN 650 MG: 325 TABLET ORAL at 19:36

## 2025-06-28 SDOH — ECONOMIC STABILITY: TRANSPORTATION INSECURITY
IN THE PAST 12 MONTHS, HAS THE LACK OF TRANSPORTATION KEPT YOU FROM MEDICAL APPOINTMENTS OR FROM GETTING MEDICATIONS?: NO

## 2025-06-28 SDOH — ECONOMIC STABILITY: TRANSPORTATION INSECURITY
IN THE PAST 12 MONTHS, HAS LACK OF RELIABLE TRANSPORTATION KEPT YOU FROM MEDICAL APPOINTMENTS, MEETINGS, WORK OR FROM GETTING THINGS NEEDED FOR DAILY LIVING?: NO

## 2025-06-28 ASSESSMENT — SOCIAL DETERMINANTS OF HEALTH (SDOH)
IN THE PAST 12 MONTHS, HAS THE ELECTRIC, GAS, OIL, OR WATER COMPANY THREATENED TO SHUT OFF SERVICE IN YOUR HOME?: NO
WITHIN THE LAST YEAR, HAVE TO BEEN RAPED OR FORCED TO HAVE ANY KIND OF SEXUAL ACTIVITY BY YOUR PARTNER OR EX-PARTNER?: NO
WITHIN THE LAST YEAR, HAVE YOU BEEN KICKED, HIT, SLAPPED, OR OTHERWISE PHYSICALLY HURT BY YOUR PARTNER OR EX-PARTNER?: NO
WITHIN THE LAST YEAR, HAVE YOU BEEN HUMILIATED OR EMOTIONALLY ABUSED IN OTHER WAYS BY YOUR PARTNER OR EX-PARTNER?: NO
WITHIN THE PAST 12 MONTHS, YOU WORRIED THAT YOUR FOOD WOULD RUN OUT BEFORE YOU GOT THE MONEY TO BUY MORE: NEVER TRUE
WITHIN THE PAST 12 MONTHS, THE FOOD YOU BOUGHT JUST DIDN'T LAST AND YOU DIDN'T HAVE MONEY TO GET MORE: NEVER TRUE
WITHIN THE LAST YEAR, HAVE YOU BEEN AFRAID OF YOUR PARTNER OR EX-PARTNER?: NO

## 2025-06-28 ASSESSMENT — ENCOUNTER SYMPTOMS
PHOTOPHOBIA: 0
HEADACHES: 0
PALPITATIONS: 0
ORTHOPNEA: 0
BACK PAIN: 0
DIZZINESS: 0
SPUTUM PRODUCTION: 0
EYE PAIN: 0
ABDOMINAL PAIN: 0
NAUSEA: 1
HEMOPTYSIS: 0
FLANK PAIN: 1
CHILLS: 0
VOMITING: 1
SINUS PAIN: 0
DIARRHEA: 0
FEVER: 0
DOUBLE VISION: 0
NECK PAIN: 0
EYE DISCHARGE: 0
SHORTNESS OF BREATH: 0

## 2025-06-28 ASSESSMENT — LIFESTYLE VARIABLES
HAVE PEOPLE ANNOYED YOU BY CRITICIZING YOUR DRINKING: NO
DOES PATIENT WANT TO STOP DRINKING: NO
TOTAL SCORE: 0
TOTAL SCORE: 0
ON A TYPICAL DAY WHEN YOU DRINK ALCOHOL HOW MANY DRINKS DO YOU HAVE: 0
EVER HAD A DRINK FIRST THING IN THE MORNING TO STEADY YOUR NERVES TO GET RID OF A HANGOVER: NO
TOTAL SCORE: 0
ALCOHOL_USE: NO
HAVE YOU EVER FELT YOU SHOULD CUT DOWN ON YOUR DRINKING: NO
EVER FELT BAD OR GUILTY ABOUT YOUR DRINKING: NO
CONSUMPTION TOTAL: NEGATIVE
AVERAGE NUMBER OF DAYS PER WEEK YOU HAVE A DRINK CONTAINING ALCOHOL: 0
HOW MANY TIMES IN THE PAST YEAR HAVE YOU HAD 5 OR MORE DRINKS IN A DAY: 0

## 2025-06-28 ASSESSMENT — PAIN DESCRIPTION - PAIN TYPE
TYPE: ACUTE PAIN
TYPE: ACUTE PAIN

## 2025-06-28 ASSESSMENT — COGNITIVE AND FUNCTIONAL STATUS - GENERAL
WALKING IN HOSPITAL ROOM: A LITTLE
SUGGESTED CMS G CODE MODIFIER DAILY ACTIVITY: CH
MOBILITY SCORE: 22
DAILY ACTIVITIY SCORE: 24
CLIMB 3 TO 5 STEPS WITH RAILING: A LITTLE
SUGGESTED CMS G CODE MODIFIER MOBILITY: CJ

## 2025-06-28 ASSESSMENT — COPD QUESTIONNAIRES
DURING THE PAST 4 WEEKS HOW MUCH DID YOU FEEL SHORT OF BREATH: NONE/LITTLE OF THE TIME
DO YOU EVER COUGH UP ANY MUCUS OR PHLEGM?: NO/ONLY WITH OCCASIONAL COLDS OR INFECTIONS
COPD SCREENING SCORE: 4
HAVE YOU SMOKED AT LEAST 100 CIGARETTES IN YOUR ENTIRE LIFE: YES

## 2025-06-28 ASSESSMENT — FIBROSIS 4 INDEX
FIB4 SCORE: 2.33
FIB4 SCORE: 2.33

## 2025-06-28 NOTE — ASSESSMENT & PLAN NOTE
Patient presents to the emergency department complaining of several day history of dysuria and flank pain  CT imaging: Areas of decreased corticomedullary differentiation in the upper pole of the left kidney, concerning for pyelonephritis.   Urinalysis positive for UTI  Continue with IV ceftriaxone  Follow-up urine cultures  Continue IV ceftriaxone  Blood cultures were positive  Follow-up final culture results    Urine culture positive for E. coli pansensitive continue ceftriaxone  Follow-up final sensitive for blood cultures

## 2025-06-28 NOTE — ASSESSMENT & PLAN NOTE
A 9 mm hypodensity in hepatic segment 4, in close proximity to falciform ligament. It is indeterminate but statistically benign.   outpatient follow-up with contrast-enhanced liver protocol MRI  Follow-up as outpatient

## 2025-06-28 NOTE — ED NOTES
"Pt reporting 9/10 upper abd pain, 3-4 days in duration. Pt reports the \"sensation of having to pee\" constantly. UA sent. PIV inserted by tech. Reports not eating much in the past 3 days due to nausea / vomiting. Pt educated on POC at this time, safety precautions in place.   "

## 2025-06-28 NOTE — ASSESSMENT & PLAN NOTE
Mildly elevated troponin  Likely secondary to demand ischemia  Follow-up repeat troponin level    Likely due to bacteremia  Troponin trending down

## 2025-06-28 NOTE — ASSESSMENT & PLAN NOTE
Patient smokes < 1PPD.  Nicotine patch and/or gum ordered.   I discussed cessation with patient including starting on nicotine patch and/or gum on discharge.  I also discussed medications to help with cessation with patient including Wellbutrin and Chantix, offered, but refused .  Smoking cessation discussed with patient for 4 minutes.    Monitoring

## 2025-06-28 NOTE — ED TRIAGE NOTES
Natividad Sewell  70 y.o. female  Chief Complaint   Patient presents with    N/V     Since 6/25    UTI     Pt reports dysuria since 6/25       Pt to triage via wheelchair for above complaint. Pt up self to scale  Pt is alert and oriented, speaking in full sentences, follows commands and responds appropriately to questions. Not in any apparent distress. Respirations are even and unlabored.  Pt placed in line for blood draw. Pt educated on triage process. Pt encouraged to alert staff for any changes.

## 2025-06-28 NOTE — PROGRESS NOTES
4 Eyes Skin Assessment Completed by ACACIA Rodrigez and Frankee, RN.    Skin assessment is primarily focused on high risk bony prominences. Pay special attention to skin beneath and around medical devices, high risk bony prominences, skin to skin areas and areas where the patient lacks sensation to feel pain and areas where the patient previously had breakdown.     Head (Occipital):  WDL   Ears (Under Medical Devices): Red and Blanching   Nose (Under Medical Devices): WDL   Mouth:  WDL   Neck: WDL   Breast/Chest:  WDL   Shoulder Blades:  WDL   Spine:   WDL   (R) Arm/Elbow/Hand: Red and Blanching   (L) Arm/Elbow/Hand: Red and Blanching   Abdomen: WDL   Pannus/Groin:  WDL   Sacrum/Coccyx:   WDL   (R) Ischial Tuberosity (Sit Bones):  WDL   (L) Ischial Tuberosity (Sit Bones):  WDL   (R) Leg:  WDL   (L) Leg:  WDL   (R) Heel:  WDL   (R) Foot/Toe: WDL   (L) Heel: WDL   (L) Foot/Toe:  WDL       DEVICES IN USE:   Respiratory Devices:  Nasal cannula  Feeding Devices:  N/A   Lines & BP Monitoring Devices:  Peripheral IV    Orthopedic Devices:  N/A  Miscellaneous Devices:  Telemetry monitor    PROTOCOL INTERVENTIONS:   Standard/Trauma Bed:  Already in place    WOUND PHOTOS:   N/A no wounds identified    WOUND CONSULT:   N/A, no advanced wound care needs identified

## 2025-06-28 NOTE — ED PROVIDER NOTES
ER Provider Note    Scribed for Дмитрий Acosta M.D. by Ramiro Urrutia. 6/28/2025   1:08 PM    Primary Care Provider: Pcp Pt States None    CHIEF COMPLAINT  Chief Complaint   Patient presents with    N/V     Since 6/25    UTI     Pt reports dysuria since 6/25     EXTERNAL RECORDS REVIEWED  Inpatient Notes Patient last seen here on 1/10/24 for concussion with loss of consciousness. She has a history of arthritis, hyperlipidemia, hypertension and stroke.      HPI/ROS  LIMITATION TO HISTORY   Select: : None  OUTSIDE HISTORIAN(S):  Family Daughter and Granddaughter are present.    Natividad Sewell is a 70 y.o. female who presents to the ED for evaluation of N/V onset five days ago. She describes her nausea as constant for the past five days. The patient states she also has dysuria, foul smelling urine, cough and abdominal pain, but denies any chest pain, fever, shortness of breath or back pain. Her dysuria has been present for the past three days. Daughter clarifies she was given Zofran in triage.  She reports positive smoke use. She denies any history of emphysema, COPD, breathing treatment or oxygen use. No alleviating or exacerbating factors were noted.     PAST MEDICAL HISTORY  Past Medical History[1]    SURGICAL HISTORY  Past Surgical History[2]    FAMILY HISTORY  No family history on file.    SOCIAL HISTORY   reports that she has been smoking cigarettes. She started smoking about 44 years ago. She has a 44.8 pack-year smoking history. She has never used smokeless tobacco. She reports current drug use. Drug: Inhaled. She reports that she does not drink alcohol.    CURRENT MEDICATIONS  Previous Medications    PROCHLORPERAZINE (COMPAZINE) 10 MG TAB    Take 1 Tablet by mouth every 6 hours as needed (headache).    ROSUVASTATIN (CRESTOR) 40 MG TABLET    Take 1 Tablet by mouth every day.    UMECLIDINIUM-VILANTEROL (ANORO ELLIPTA) 62.5-25 MCG/ACT AEROSOL POWDER, BREATH ACTIVATED INHALER    Inhale 1 Puff every day.  "      ALLERGIES  Allergies[3]     PHYSICAL EXAM  /64   Pulse 81   Temp 37.6 °C (99.7 °F) (Oral)   Resp (!) 26   Ht 1.626 m (5' 4\")   Wt 72.1 kg (158 lb 15.2 oz)   LMP 09/16/2009   SpO2 92%   BMI 27.28 kg/m²    Constitutional: Well developed, Well nourished, Mild distress,    HENT: Normocephalic, Atraumatic   Eyes: PERRLA, EOMI, Conjunctiva normal, No discharge.   Neck: No tenderness, Supple, No stridor.   Cardiovascular: Normal heart rate, Normal rhythm.   Thorax & Lungs: Decreased breath sounds with rhonchi and occasional wheezes,   Abdomen: Soft, No tenderness, No masses.   Skin: Warm, Dry, No rash.    Musculoskeletal: No major deformities noted.  Neurologic: Awake, alert. Moves all extremities spontaneously.  Psychiatric: Affect normal, Judgment normal, Mood normal.      DIAGNOSTIC STUDIES    Labs:   Results for orders placed or performed during the hospital encounter of 06/28/25   CBC with Differential    Collection Time: 06/28/25 12:16 PM   Result Value Ref Range    WBC 13.0 (H) 4.8 - 10.8 K/uL    RBC 5.04 4.20 - 5.40 M/uL    Hemoglobin 14.5 12.0 - 16.0 g/dL    Hematocrit 43.2 37.0 - 47.0 %    MCV 85.7 81.4 - 97.8 fL    MCH 28.8 27.0 - 33.0 pg    MCHC 33.6 32.2 - 35.5 g/dL    RDW 45.1 35.9 - 50.0 fL    Platelet Count 205 164 - 446 K/uL    MPV 10.1 9.0 - 12.9 fL    Neutrophils-Polys 88.10 (H) 44.00 - 72.00 %    Lymphocytes 3.80 (L) 22.00 - 41.00 %    Monocytes 5.50 0.00 - 13.40 %    Eosinophils 1.90 0.00 - 6.90 %    Basophils 0.20 0.00 - 1.80 %    Immature Granulocytes 0.50 0.00 - 0.90 %    Nucleated RBC 0.00 0.00 - 0.20 /100 WBC    Neutrophils (Absolute) 11.43 (H) 1.82 - 7.42 K/uL    Lymphs (Absolute) 0.49 (L) 1.00 - 4.80 K/uL    Monos (Absolute) 0.71 0.00 - 0.85 K/uL    Eos (Absolute) 0.25 0.00 - 0.51 K/uL    Baso (Absolute) 0.03 0.00 - 0.12 K/uL    Immature Granulocytes (abs) 0.06 0.00 - 0.11 K/uL    NRBC (Absolute) 0.00 K/uL   Complete Metabolic Panel    Collection Time: 06/28/25 12:16 PM "   Result Value Ref Range    Sodium 126 (L) 135 - 145 mmol/L    Potassium 3.5 (L) 3.6 - 5.5 mmol/L    Chloride 91 (L) 96 - 112 mmol/L    Co2 20 20 - 33 mmol/L    Anion Gap 15.0 7.0 - 16.0    Glucose 138 (H) 65 - 99 mg/dL    Bun 13 8 - 22 mg/dL    Creatinine 0.82 0.50 - 1.40 mg/dL    Calcium 8.6 8.5 - 10.5 mg/dL    Correct Calcium 9.0 8.5 - 10.5 mg/dL    AST(SGOT) 23 12 - 45 U/L    ALT(SGPT) 11 2 - 50 U/L    Alkaline Phosphatase 141 (H) 30 - 99 U/L    Total Bilirubin 1.3 0.1 - 1.5 mg/dL    Albumin 3.5 3.2 - 4.9 g/dL    Total Protein 7.1 6.0 - 8.2 g/dL    Globulin 3.6 (H) 1.9 - 3.5 g/dL    A-G Ratio 1.0 g/dL   Lipase    Collection Time: 06/28/25 12:16 PM   Result Value Ref Range    Lipase 11 11 - 82 U/L   ESTIMATED GFR    Collection Time: 06/28/25 12:16 PM   Result Value Ref Range    GFR (CKD-EPI) 77 >60 mL/min/1.73 m 2   proBrain Natriuretic Peptide, NT    Collection Time: 06/28/25 12:16 PM   Result Value Ref Range    NT-proBNP 93249 (H) 0 - 125 pg/mL   TROPONIN    Collection Time: 06/28/25 12:16 PM   Result Value Ref Range    Troponin T 36 (H) 6 - 19 ng/L   Urinalysis    Collection Time: 06/28/25 12:55 PM    Specimen: Urine   Result Value Ref Range    Color Yellow     Character Turbid (A)     Specific Gravity 1.012 <1.035    Ph 5.5 5.0 - 8.0    Glucose Negative Negative mg/dL    Ketones 15 (A) Negative mg/dL    Protein 300 (A) Negative mg/dL    Bilirubin Negative Negative    Urobilinogen, Urine 1.0 <=1.0 EU/dL    Nitrite Positive (A) Negative    Leukocyte Esterase Moderate (A) Negative    Occult Blood Large (A) Negative    Micro Urine Req Microscopic    URINE MICROSCOPIC (W/UA)    Collection Time: 06/28/25 12:55 PM   Result Value Ref Range    WBC >100 (A) /hpf    RBC 11-20 (A) 0 - 2 /hpf    Bacteria Many (A) None /hpf    Epithelial Cells 0-2 0 - 5 /hpf    Urine Casts 3-5 (A) 0 - 2 /lpf   Lactic Acid    Collection Time: 06/28/25  2:05 PM   Result Value Ref Range    Lactic Acid 1.0 0.5 - 2.0 mmol/L   EKG    Collection  Time: 25  3:48 PM   Result Value Ref Range    Report       [facility] Emergency Dept.    Test Date:  2025  Pt Name:    URBAN AGUIAR                  Department: ER  MRN:        [id number]                      Room:        23  Gender:     Female                       Technician: [id number]  :        [id number]                   Requested By:BRENDA PAN  Order #:    [id number]                    Reading MD: BRENDA PAN MD    Measurements  Intervals                                Axis  Rate:       75                           P:          67  TN:         140                          QRS:        70  QRSD:       105                          T:          141  QT:         411  QTc:        460    Interpretive Statements  Sinus rhythm  Probable LVH with secondary repol abnrm  Baseline wander in lead(s) I,aVR  Compared to ECG 01/10/2024 11:48:50  Myocardial infarct finding no longer present  Electronically Signed On 2025 15:48:15 PDT by BRENDA PAN MD       I independently interpreted the EKG as seen above.     Radiology:   This attending emergency physician has independently interpreted the diagnostic imaging associated with this visit and is awaiting the final reading from the radiologist.   Preliminary interpretation is a follows: Aortic aneurysm  Radiologist interpretation:   CT-ABDOMEN-PELVIS WITH   Final Result      1.  Areas of decreased corticomedullary differentiation in the upper pole of the left kidney, concerning for pyelonephritis. Please correlate with urinalysis.   2.  A 5.8 cm infrarenal abdominal aortic aneurysm containing a large mural thrombus.   3.  Advanced atherosclerosis, with moderate to severe stenosis of bilateral common iliac arteries. At least moderate stenosis of the visualized left superficial femoral artery.   4.  A 9 mm hypodensity in hepatic segment 4, in close proximity to falciform ligament. It is indeterminate but statistically benign.  Consider outpatient follow-up with contrast-enhanced liver protocol MRI   5.  Colonic diverticulosis.   6.  Tiny hiatal hernia.      DX-CHEST-PORTABLE (1 VIEW)   Final Result      1.  Mild pulmonary edema.   2.  No pneumonia or pneumothorax.   3.  Minimal LEFT lung base atelectasis.         COURSE & MEDICAL DECISION MAKING     Sepsis: Infection was suspected 1:12 PM (Time). Sepsis pathway was initiated. Fluids not needed (no hypotension or lactate greater than or equal to 4). Antibiotics were given per protocol.    INITIAL ASSESSMENT, COURSE AND PLAN  Differential diagnoses include but not limited to: Sepsis, pneumonia or COPD.     Care Narrative: Patient is coming in secondary to nausea vomiting, the patient does have some flank pain, urinary tract infection.  Patient is also short of breath, given his recent breathing treatment, chest x-ray was negative.  CT scan of the abdomen pelvis shows pyelonephritis.  The patient also has an infrarenal abdominal aortic aneurysm.  Discussed the case with Dr. Gutierrez who recommended outpatient treatment.  Give the patient IV antibiotics, the patient will need to be admitted to the hospital, discussed case with the hospitalist for hospitalization.    1:08 PM - Patient was evaluated at bedside. Ordered for EKG, UA, Lipase, CMP, CBC w/ Diff, Urine Microscopic, troponin, pBNP, Blood Culture x2, Urine Culture, Lactic Acid and DX-Chest to evaluate. The patient will be medicated with DUONEB, Zofran 4 mg and Ancef 2 g for her symptoms. Patient verbalizes understanding and support with my plan of care. Sepsis protocol was initiated.     3:43 PM - Patient was reevaluated at bedside. Discussed lab and radiology results with the patient and informed them their imaging results reveal evidence of a 5 cm aneurysm in her abdomen. I also informed the patient about her positive UTI result. I updated plan of care, including hospitalization. Patient verbalizes understanding and agreement to this  plan of care.      3:52 PM - I vaulted Vascular Surgery regarding the patient's condition including the 5.8 cm infrarenal abdominal aortic aneurysm, pyelonephritis, hyponatremia and exacerbation of COPD. They agreed to follow up with the patient    4:05 PM - Spoke with Dr. Foster , (Hospitalist), about the patient's condition who agreed to hospitalize the patient. Care was transferred at this time.     DISPOSITION AND DISCUSSIONS    I have discussed management of the patient with the following physicians and CLAUDE's:  Dr. Foster (Hospitalist), Vascular Surgery    Discussion of management with other Q or appropriate source(s): None     Barriers to care at this time, including but not limited to: Patient does not have established PCP.     DISPOSITION:  Patient will be hospitalized by Dr. Foster in guarded condition.     FINAL DIAGNOSIS  1. Pyelonephritis    2. Nausea and vomiting, unspecified vomiting type    3. Acute exacerbation of chronic obstructive pulmonary disease (COPD) (HCC)    4. Hyponatremia    5. Infrarenal abdominal aortic aneurysm (AAA) without rupture (HCC)       Ramiro MACHADO (Hemant), am scribing for, and in the presence of, Дмитрий Acosta M.D..    Electronically signed by: Ramiro Urrutia (Hemant), 6/28/2025    IДмитрий M.D. personally performed the services described in this documentation, as scribed by Ramiro Urrutia in my presence, and it is both accurate and complete.      The note accurately reflects work and decisions made by me.  Дмитрий Acosta M.D.  6/28/2025  6:10 PM          [1]   Past Medical History:  Diagnosis Date    Arthritis     Dental disorder 03/16/2023    upper/lowers full plates    High cholesterol 03/16/2023    medicated    Hypertension     pt denies- not taking any medication currently    Migraines, neuralgic     Mitral valve prolapse     Stroke (HCC) 06/04/2009    no residual weaknesses or problems    Stroke (HCC) 03/16/2023    right eye stroke in 2/13/23  "  [2]   Past Surgical History:  Procedure Laterality Date    CAROTID ENDARTERECTOMY  6/6/2009    Performed by ALCIDES MERINO at [facility]    GYN SURGERY Bilateral     tubal ligation   [3]   Allergies  Allergen Reactions    Morphine      \"I don't know, it was during my recovery after the endarterectomy-they told me I had a bad reaction to it\"     "

## 2025-06-28 NOTE — ASSESSMENT & PLAN NOTE
Likely hypovolemic hyponatremia  Continue with IV fluids with normal saline  Follow-up with morning BMP  Avoid overcorrection more than 6 to 8 mEq over 24 hours    Follow-up sodium in a.m.

## 2025-06-28 NOTE — ASSESSMENT & PLAN NOTE
CT imagin.8 cm infrarenal abdominal aortic aneurysm containing a large mural thrombus.   Dr. Gutierrez consulted. Recommended outpatient follow up with close blood pressure control     follow-up as outpatient

## 2025-06-28 NOTE — H&P
Hospital Medicine History & Physical Note    Date of Service  6/28/2025    Primary Care Physician  Pcp Pt States None        Code Status  Full Code    Chief Complaint  Chief Complaint   Patient presents with    N/V     Since 6/25    UTI     Pt reports dysuria since 6/25       History of Presenting Illness  Natividad Sewell is a 70 y.o. female who presented 6/28/2025 with UTI-like symptoms.  Patient has a past medical history of 40-pack-year smoking history, hypertension, hyperlipidemia, who presented to the emergency department complaining of dysuria since June 25.  Patient states that she has also been experiencing some increased urinary frequency along with nausea and vomiting.  Patient otherwise denies any fevers or chills.  She has been experiencing left-sided flank pain.  In our emergency department, vital signs are stable.  She does have a mild leukocytosis greater than 13,000.  CT imaging was obtained and was concerning for left-sided pyelonephritis.  There is also a 5.8 infrarenal abdominal aortic aneurysm containing a large mural thrombus.  This was discussed with our vascular surgeons, recommended outpatient follow-up.    I discussed the plan of care with patient.    Review of Systems  Review of Systems   Constitutional:  Negative for chills and fever.   HENT:  Negative for congestion, ear discharge, ear pain, nosebleeds and sinus pain.    Eyes:  Negative for double vision, photophobia, pain and discharge.   Respiratory:  Negative for hemoptysis, sputum production and shortness of breath.    Cardiovascular:  Negative for chest pain, palpitations and orthopnea.   Gastrointestinal:  Positive for nausea and vomiting. Negative for abdominal pain and diarrhea.   Genitourinary:  Positive for dysuria, flank pain and frequency. Negative for hematuria and urgency.   Musculoskeletal:  Negative for back pain, joint pain and neck pain.   Neurological:  Negative for dizziness and headaches.   All other systems  reviewed and are negative.      Past Medical History   has a past medical history of Arthritis, Dental disorder (03/16/2023), High cholesterol (03/16/2023), Hypertension, Migraines, neuralgic, Mitral valve prolapse, Stroke (HCC) (06/04/2009), and Stroke (HCC) (03/16/2023).    Surgical History   has a past surgical history that includes carotid endarterectomy (6/6/2009) and gyn surgery (Bilateral).     Family History  No family history on file.     Family history reviewed with patient. There is no family history that is pertinent to the chief complaint.     Social History   reports that she has been smoking cigarettes. She started smoking about 44 years ago. She has a 44.8 pack-year smoking history. She has never used smokeless tobacco. She reports current drug use. Drug: Inhaled. She reports that she does not drink alcohol.    Allergies  Allergies[1]    Medications  None       Physical Exam  Temp:  [37.6 °C (99.7 °F)] 37.6 °C (99.7 °F)  Pulse:  [73-89] 73  Resp:  [16-26] 22  BP: (103-124)/(57-69) 103/59  SpO2:  [88 %-98 %] 96 %  Blood Pressure : 103/59   Temperature: 37.6 °C (99.7 °F)   Pulse: 73   Respiration: (!) 22   Pulse Oximetry: 96 %       Physical Exam  Constitutional:       General: She is not in acute distress.     Appearance: Normal appearance. She is normal weight. She is ill-appearing. She is not toxic-appearing or diaphoretic.   HENT:      Head: Normocephalic and atraumatic.      Nose: Nose normal.      Mouth/Throat:      Mouth: Mucous membranes are moist.   Eyes:      Extraocular Movements: Extraocular movements intact.      Pupils: Pupils are equal, round, and reactive to light.   Cardiovascular:      Rate and Rhythm: Normal rate and regular rhythm.      Pulses: Normal pulses.      Heart sounds: Normal heart sounds. No murmur heard.     No friction rub. No gallop.   Pulmonary:      Effort: Pulmonary effort is normal. No respiratory distress.      Breath sounds: No stridor. No wheezing, rhonchi or  rales.   Chest:      Chest wall: No tenderness.   Abdominal:      General: Abdomen is flat. There is no distension.      Palpations: Abdomen is soft. There is no mass.      Tenderness: There is no abdominal tenderness. There is no right CVA tenderness, left CVA tenderness, guarding or rebound.      Hernia: No hernia is present.   Musculoskeletal:         General: No swelling, tenderness, deformity or signs of injury.      Right lower leg: No edema.      Left lower leg: No edema.      Comments:      Skin:     General: Skin is warm and dry.      Capillary Refill: Capillary refill takes less than 2 seconds.      Coloration: Skin is not jaundiced or pale.      Findings: No bruising, erythema, lesion or rash.   Neurological:      General: No focal deficit present.      Mental Status: She is alert and oriented to person, place, and time. Mental status is at baseline.      Cranial Nerves: No cranial nerve deficit.      Sensory: No sensory deficit.      Motor: No weakness.      Coordination: Coordination normal.   Psychiatric:         Mood and Affect: Mood normal.         Behavior: Behavior normal.         Laboratory:  Recent Labs     06/28/25  1216   WBC 13.0*   RBC 5.04   HEMOGLOBIN 14.5   HEMATOCRIT 43.2   MCV 85.7   MCH 28.8   MCHC 33.6   RDW 45.1   PLATELETCT 205   MPV 10.1     Recent Labs     06/28/25  1216   SODIUM 126*   POTASSIUM 3.5*   CHLORIDE 91*   CO2 20   GLUCOSE 138*   BUN 13   CREATININE 0.82   CALCIUM 8.6     Recent Labs     06/28/25  1216   ALTSGPT 11   ASTSGOT 23   ALKPHOSPHAT 141*   TBILIRUBIN 1.3   LIPASE 11   GLUCOSE 138*         Recent Labs     06/28/25  1216   NTPROBNP 65260*         Recent Labs     06/28/25  1216   TROPONINT 36*       Imaging:  CT-ABDOMEN-PELVIS WITH   Final Result      1.  Areas of decreased corticomedullary differentiation in the upper pole of the left kidney, concerning for pyelonephritis. Please correlate with urinalysis.   2.  A 5.8 cm infrarenal abdominal aortic aneurysm  containing a large mural thrombus.   3.  Advanced atherosclerosis, with moderate to severe stenosis of bilateral common iliac arteries. At least moderate stenosis of the visualized left superficial femoral artery.   4.  A 9 mm hypodensity in hepatic segment 4, in close proximity to falciform ligament. It is indeterminate but statistically benign. Consider outpatient follow-up with contrast-enhanced liver protocol MRI   5.  Colonic diverticulosis.   6.  Tiny hiatal hernia.      DX-CHEST-PORTABLE (1 VIEW)   Final Result      1.  Mild pulmonary edema.   2.  No pneumonia or pneumothorax.   3.  Minimal LEFT lung base atelectasis.          X-Ray:  I have personally reviewed the images and compared with prior images.  EKG:  I have personally reviewed the images and compared with prior images.    Assessment/Plan:  Justification for Admission Status  I anticipate this patient will require at least two midnights for appropriate medical management, necessitating inpatient admission because pyelonephritis    Patient will need a Telemetry bed on MEDICAL service .  The need is secondary to pyelonephritis.    * Acute pyelonephritis- (present on admission)  Assessment & Plan  Patient presents to the emergency department complaining of several day history of dysuria and flank pain  CT imaging: Areas of decreased corticomedullary differentiation in the upper pole of the left kidney, concerning for pyelonephritis.   Urinalysis positive for UTI  Continue with IV ceftriaxone  Follow-up urine cultures    AAA (abdominal aortic aneurysm) (HCC)  Assessment & Plan  CT imagin.8 cm infrarenal abdominal aortic aneurysm containing a large mural thrombus.   Dr. Gutierrez consulted. Recommended outpatient follow up with close blood pressure control     Elevated troponin  Assessment & Plan  Mildly elevated troponin  Likely secondary to demand ischemia  Follow-up repeat troponin level      Leukocytosis  Assessment & Plan  Continue with IV  "antibiotics  Follow-up morning CBC    Hyponatremia  Assessment & Plan  Likely hypovolemic hyponatremia  Continue with IV fluids with normal saline  Follow-up with morning BMP  Avoid overcorrection more than 6 to 8 mEq over 24 hours      Hypokalemia  Assessment & Plan  Replete with IV and p.o. potassium  Follow-up with daily BMP    Nausea & vomiting  Assessment & Plan  Continue with IV and p.o. antiemetics  Continue with IV fluids    Tobacco use- (present on admission)  Assessment & Plan  Patient smokes < 1PPD.  Nicotine patch and/or gum ordered.   I discussed cessation with patient including starting on nicotine patch and/or gum on discharge.  I also discussed medications to help with cessation with patient including Wellbutrin and Chantix, offered, but refused .  Smoking cessation discussed with patient for 4 minutes.      Other hyperlipidemia- (present on admission)  Assessment & Plan  Continue statin     Hepatic lesion  Assessment & Plan  A 9 mm hypodensity in hepatic segment 4, in close proximity to falciform ligament. It is indeterminate but statistically benign.   outpatient follow-up with contrast-enhanced liver protocol MRI         VTE prophylaxis: enoxaparin ppx         [1]   Allergies  Allergen Reactions    Morphine      \"I don't know, it was during my recovery after the endarterectomy-they told me I had a bad reaction to it\"     "

## 2025-06-28 NOTE — ED NOTES
Med rec completed per patient at bedside.    Allergies reviewed.    Outpatient antimicrobials within the last 30 days: NONE.    ANTICOAGULANTS: NONE.    Dispense history via [employer] is not available in [employer].    Preferred pharmacy: [facility] on [address].    *Patient denies using any prescription medications at this time. No recent over-the-counter medications.

## 2025-06-29 LAB
ALBUMIN SERPL BCP-MCNC: 3 G/DL (ref 3.2–4.9)
ALBUMIN/GLOB SERPL: 0.9 G/DL
ALP SERPL-CCNC: 102 U/L (ref 30–99)
ALT SERPL-CCNC: 11 U/L (ref 2–50)
ANION GAP SERPL CALC-SCNC: 12 MMOL/L (ref 7–16)
AST SERPL-CCNC: 16 U/L (ref 12–45)
BILIRUB SERPL-MCNC: 0.4 MG/DL (ref 0.1–1.5)
BUN SERPL-MCNC: 15 MG/DL (ref 8–22)
CALCIUM ALBUM COR SERPL-MCNC: 9.1 MG/DL (ref 8.5–10.5)
CALCIUM SERPL-MCNC: 8.3 MG/DL (ref 8.5–10.5)
CHLORIDE SERPL-SCNC: 97 MMOL/L (ref 96–112)
CO2 SERPL-SCNC: 21 MMOL/L (ref 20–33)
CREAT SERPL-MCNC: 0.71 MG/DL (ref 0.5–1.4)
ERYTHROCYTE [DISTWIDTH] IN BLOOD BY AUTOMATED COUNT: 47.1 FL (ref 35.9–50)
GFR SERPLBLD CREATININE-BSD FMLA CKD-EPI: 92 ML/MIN/1.73 M 2
GLOBULIN SER CALC-MCNC: 3.4 G/DL (ref 1.9–3.5)
GLUCOSE SERPL-MCNC: 147 MG/DL (ref 65–99)
HCT VFR BLD AUTO: 45.7 % (ref 37–47)
HGB BLD-MCNC: 14.6 G/DL (ref 12–16)
MCH RBC QN AUTO: 28.1 PG (ref 27–33)
MCHC RBC AUTO-ENTMCNC: 31.9 G/DL (ref 32.2–35.5)
MCV RBC AUTO: 87.9 FL (ref 81.4–97.8)
PLATELET # BLD AUTO: 172 K/UL (ref 164–446)
PMV BLD AUTO: 10.7 FL (ref 9–12.9)
POTASSIUM SERPL-SCNC: 4.1 MMOL/L (ref 3.6–5.5)
PROT SERPL-MCNC: 6.4 G/DL (ref 6–8.2)
RBC # BLD AUTO: 5.2 M/UL (ref 4.2–5.4)
SODIUM SERPL-SCNC: 130 MMOL/L (ref 135–145)
TROPONIN T SERPL-MCNC: 36 NG/L (ref 6–19)
WBC # BLD AUTO: 13.4 K/UL (ref 4.8–10.8)

## 2025-06-29 PROCEDURE — A9270 NON-COVERED ITEM OR SERVICE: HCPCS | Performed by: STUDENT IN AN ORGANIZED HEALTH CARE EDUCATION/TRAINING PROGRAM

## 2025-06-29 PROCEDURE — 80053 COMPREHEN METABOLIC PANEL: CPT

## 2025-06-29 PROCEDURE — 700111 HCHG RX REV CODE 636 W/ 250 OVERRIDE (IP): Performed by: HOSPITALIST

## 2025-06-29 PROCEDURE — 770020 HCHG ROOM/CARE - TELE (206)

## 2025-06-29 PROCEDURE — 84484 ASSAY OF TROPONIN QUANT: CPT

## 2025-06-29 PROCEDURE — 85027 COMPLETE CBC AUTOMATED: CPT

## 2025-06-29 PROCEDURE — 700105 HCHG RX REV CODE 258: Performed by: HOSPITALIST

## 2025-06-29 PROCEDURE — 700102 HCHG RX REV CODE 250 W/ 637 OVERRIDE(OP): Performed by: STUDENT IN AN ORGANIZED HEALTH CARE EDUCATION/TRAINING PROGRAM

## 2025-06-29 PROCEDURE — 99233 SBSQ HOSP IP/OBS HIGH 50: CPT | Performed by: HOSPITALIST

## 2025-06-29 PROCEDURE — 700102 HCHG RX REV CODE 250 W/ 637 OVERRIDE(OP): Performed by: HOSPITALIST

## 2025-06-29 PROCEDURE — 36415 COLL VENOUS BLD VENIPUNCTURE: CPT

## 2025-06-29 PROCEDURE — 99406 BEHAV CHNG SMOKING 3-10 MIN: CPT

## 2025-06-29 PROCEDURE — A9270 NON-COVERED ITEM OR SERVICE: HCPCS | Performed by: HOSPITALIST

## 2025-06-29 RX ORDER — OMEPRAZOLE 20 MG/1
20 CAPSULE, DELAYED RELEASE ORAL DAILY
Status: DISCONTINUED | OUTPATIENT
Start: 2025-06-29 | End: 2025-07-01 | Stop reason: HOSPADM

## 2025-06-29 RX ADMIN — NICOTINE 7 MG: 7 PATCH TRANSDERMAL at 05:29

## 2025-06-29 RX ADMIN — ROSUVASTATIN CALCIUM 40 MG: 20 TABLET, FILM COATED ORAL at 05:29

## 2025-06-29 RX ADMIN — OMEPRAZOLE 20 MG: 20 CAPSULE, DELAYED RELEASE ORAL at 11:58

## 2025-06-29 RX ADMIN — CEFTRIAXONE SODIUM 2000 MG: 10 INJECTION, POWDER, FOR SOLUTION INTRAVENOUS at 11:58

## 2025-06-29 ASSESSMENT — ENCOUNTER SYMPTOMS
HEMOPTYSIS: 0
PALPITATIONS: 0
HEADACHES: 0
PND: 0
MYALGIAS: 0
NAUSEA: 0
CLAUDICATION: 0
DOUBLE VISION: 0
WHEEZING: 0
DIZZINESS: 0
ABDOMINAL PAIN: 1
HEARTBURN: 0
BLURRED VISION: 0
CHILLS: 0
FEVER: 0
COUGH: 0
BACK PAIN: 0
VOMITING: 0
BRUISES/BLEEDS EASILY: 0
DEPRESSION: 0

## 2025-06-29 ASSESSMENT — PAIN DESCRIPTION - PAIN TYPE
TYPE: ACUTE PAIN
TYPE: ACUTE PAIN

## 2025-06-29 ASSESSMENT — LIFESTYLE VARIABLES: PACK_YEARS: 44

## 2025-06-29 ASSESSMENT — FIBROSIS 4 INDEX: FIB4 SCORE: 2.78

## 2025-06-29 NOTE — HOSPITAL COURSE
Natividad Sewell is a 70 y.o. female who presented 6/28/2025 with UTI-like symptoms.  Patient has a past medical history of 40-pack-year smoking history, hypertension, hyperlipidemia, who presented to the emergency department complaining of dysuria since June 25.  Patient states that she has also been experiencing some increased urinary frequency along with nausea and vomiting.  Patient otherwise denies any fevers or chills.  She has been experiencing left-sided flank pain.  In our emergency department, vital signs are stable.  She does have a mild leukocytosis greater than 13,000.  CT imaging was obtained and was concerning for left-sided pyelonephritis.  There is also a 5.8 infrarenal abdominal aortic aneurysm containing a large mural thrombus.  This was discussed with our vascular surgeons, recommended outpatient follow-up

## 2025-06-29 NOTE — CARE PLAN
The patient is Watcher - Medium risk of patient condition declining or worsening    Shift Goals  Clinical Goals: VSS, Moniotor labs, abx  Patient Goals: feel better/ control Nausea    Progress made toward(s) clinical / shift goals:    Problem: Pain - Standard  Goal: Alleviation of pain or a reduction in pain to the patient’s comfort goal  Description: Target End Date:  Prior to discharge or change in level of care    Document on Vitals flowsheet    1.  Document pain using the appropriate pain scale per order or unit policy  2.  Educate and implement non-pharmacologic comfort measures (i.e. relaxation, distraction, massage, cold/heat therapy, etc.)  3.  Pain management medications as ordered  4.  Reassess pain after pain med administration per policy  5.  If opiods administered assess patient's response to pain medication is appropriate per POSS sedation scale  6.  Follow pain management plan developed in collaboration with patient and interdisciplinary team (including palliative care or pain specialists if applicable)  Outcome: Progressing     Problem: Knowledge Deficit - Standard  Goal: Patient and family/care givers will demonstrate understanding of plan of care, disease process/condition, diagnostic tests and medications  Description: Target End Date:  1-3 days or as soon as patient condition allows    Document in Patient Education    1.  Patient and family/caregiver oriented to unit, equipment, visitation policy and means for communicating concern  2.  Complete/review Learning Assessment  3.  Assess knowledge level of disease process/condition, treatment plan, diagnostic tests and medications  4.  Explain disease process/condition, treatment plan, diagnostic tests and medications  Outcome: Progressing     Problem: Fall Risk  Goal: Patient will remain free from falls  Description: Target End Date:  Prior to discharge or change in level of care    Document interventions on the [facility] Fall Risk  Assessment    1.  Assess for fall risk factors  2.  Implement fall precautions  Outcome: Progressing       Patient is not progressing towards the following goals:

## 2025-06-29 NOTE — RESPIRATORY CARE
" SMOKING CESSATION EDUCATION by COPD CLINICAL EDUCATOR  6/29/2025 at 11:37 AM by Kavya Bahena, RRT     Smoking Cessation Intervention and education completed, 6 minutes spent on smoking cessation education with patient.  Provided smoking cessation packet with \"Tips to Quit\" and brochure for \"Free Smoking Cessation Classes\". Patient is motivated to quit.    Patient reviewed by COPD education team. Patient denies a history or diagnosis of COPD, Therefore, patient does not qualify for the COPD program.  No home medications. Denies taking inhalers other than when admitted to hospital.                  "

## 2025-06-29 NOTE — PROGRESS NOTES
Hospital Medicine Daily Progress Note    Date of Service  6/29/2025    Chief Complaint  Natividad Sewell is a 70 y.o. female admitted 6/28/2025 with pyelonephritis    Hospital Course  Natividad Sewell is a 70 y.o. female who presented 6/28/2025 with UTI-like symptoms.  Patient has a past medical history of 40-pack-year smoking history, hypertension, hyperlipidemia, who presented to the emergency department complaining of dysuria since June 25.  Patient states that she has also been experiencing some increased urinary frequency along with nausea and vomiting.  Patient otherwise denies any fevers or chills.  She has been experiencing left-sided flank pain.  In our emergency department, vital signs are stable.  She does have a mild leukocytosis greater than 13,000.  CT imaging was obtained and was concerning for left-sided pyelonephritis.  There is also a 5.8 infrarenal abdominal aortic aneurysm containing a large mural thrombus.  This was discussed with our vascular surgeons, recommended outpatient follow-up     Interval Problem Update  6/29: Patient is new to me today, patient is alert oriented follows commands, complaining of heartburn, positive blood cultures for gram-negative rods, continue ceftriaxone, follow-up final culture result and urinary results discussed with bedside nurse charge nurse  pharmacist.    I have discussed this patient's plan of care and discharge plan at IDT rounds today with Case Management, Nursing, Nursing leadership, and other members of the IDT team.    Consultants/Specialty      Code Status  Full Code    Disposition  The patient is not medically cleared for discharge to home or a post-acute facility.      I have placed the appropriate orders for post-discharge needs.    Review of Systems  Review of Systems   Constitutional:  Positive for malaise/fatigue. Negative for chills and fever.   Eyes:  Negative for blurred vision and double vision.   Respiratory:  Negative for  cough, hemoptysis and wheezing.    Cardiovascular:  Negative for chest pain, palpitations, claudication, leg swelling and PND.   Gastrointestinal:  Positive for abdominal pain. Negative for heartburn, nausea and vomiting.   Genitourinary:  Negative for hematuria and urgency.   Musculoskeletal:  Negative for back pain and myalgias.   Skin:  Negative for rash.   Neurological:  Negative for dizziness and headaches.   Endo/Heme/Allergies:  Does not bruise/bleed easily.   Psychiatric/Behavioral:  Negative for depression.         Physical Exam  Temp:  [35.7 °C (96.3 °F)-36.4 °C (97.5 °F)] 36.4 °C (97.5 °F)  Pulse:  [73-93] 77  Resp:  [16-22] 18  BP: ()/(57-71) 109/57  SpO2:  [88 %-98 %] 94 %    Physical Exam  Vitals and nursing note reviewed.   Constitutional:       Appearance: Normal appearance.   Eyes:      General: No scleral icterus.  Cardiovascular:      Rate and Rhythm: Normal rate and regular rhythm.      Pulses: Normal pulses.      Heart sounds: Normal heart sounds.   Pulmonary:      Effort: Pulmonary effort is normal. No respiratory distress.   Abdominal:      General: Bowel sounds are normal. There is no distension.      Tenderness: There is no abdominal tenderness.   Musculoskeletal:         General: Normal range of motion.      Cervical back: Normal range of motion and neck supple.      Right lower leg: No edema.      Left lower leg: No edema.   Skin:     General: Skin is warm and dry.      Capillary Refill: Capillary refill takes less than 2 seconds.      Coloration: Skin is not jaundiced.   Neurological:      General: No focal deficit present.      Mental Status: She is alert and oriented to person, place, and time.      Cranial Nerves: No cranial nerve deficit.   Psychiatric:         Mood and Affect: Mood normal.         Behavior: Behavior normal.         Fluids    Intake/Output Summary (Last 24 hours) at 6/29/2025 1334  Last data filed at 6/29/2025 0800  Gross per 24 hour   Intake 440 ml   Output 550  ml   Net -110 ml        Laboratory  Recent Labs     06/28/25  1216 06/29/25  0103   WBC 13.0* 13.4*   RBC 5.04 5.20   HEMOGLOBIN 14.5 14.6   HEMATOCRIT 43.2 45.7   MCV 85.7 87.9   MCH 28.8 28.1   MCHC 33.6 31.9*   RDW 45.1 47.1   PLATELETCT 205 172   MPV 10.1 10.7     Recent Labs     06/28/25  1216 06/29/25  0619   SODIUM 126* 130*   POTASSIUM 3.5* 4.1   CHLORIDE 91* 97   CO2 20 21   GLUCOSE 138* 147*   BUN 13 15   CREATININE 0.82 0.71   CALCIUM 8.6 8.3*                   Imaging  CT-ABDOMEN-PELVIS WITH   Final Result      1.  Areas of decreased corticomedullary differentiation in the upper pole of the left kidney, concerning for pyelonephritis. Please correlate with urinalysis.   2.  A 5.8 cm infrarenal abdominal aortic aneurysm containing a large mural thrombus.   3.  Advanced atherosclerosis, with moderate to severe stenosis of bilateral common iliac arteries. At least moderate stenosis of the visualized left superficial femoral artery.   4.  A 9 mm hypodensity in hepatic segment 4, in close proximity to falciform ligament. It is indeterminate but statistically benign. Consider outpatient follow-up with contrast-enhanced liver protocol MRI   5.  Colonic diverticulosis.   6.  Tiny hiatal hernia.      DX-CHEST-PORTABLE (1 VIEW)   Final Result      1.  Mild pulmonary edema.   2.  No pneumonia or pneumothorax.   3.  Minimal LEFT lung base atelectasis.           Assessment/Plan  * Acute pyelonephritis- (present on admission)  Assessment & Plan  Patient presents to the emergency department complaining of several day history of dysuria and flank pain  CT imaging: Areas of decreased corticomedullary differentiation in the upper pole of the left kidney, concerning for pyelonephritis.   Urinalysis positive for UTI  Continue with IV ceftriaxone  Follow-up urine cultures  Continue IV ceftriaxone  Blood cultures were positive  Follow-up final culture results      Hepatic lesion  Assessment & Plan  A 9 mm hypodensity in  hepatic segment 4, in close proximity to falciform ligament. It is indeterminate but statistically benign.   outpatient follow-up with contrast-enhanced liver protocol MRI  Follow-up as outpatient    AAA (abdominal aortic aneurysm) (HCC)  Assessment & Plan  CT imagin.8 cm infrarenal abdominal aortic aneurysm containing a large mural thrombus.   Dr. Gutierrez consulted. Recommended outpatient follow up with close blood pressure control     follow-up as outpatient        Elevated troponin  Assessment & Plan  Mildly elevated troponin  Likely secondary to demand ischemia  Follow-up repeat troponin level    Likely due to bacteremia  Troponin trending down      Leukocytosis  Assessment & Plan  Continue with IV antibiotics  Follow-up morning CBC    CBC in a.m.    Hyponatremia  Assessment & Plan  Likely hypovolemic hyponatremia  Continue with IV fluids with normal saline  Follow-up with morning BMP  Avoid overcorrection more than 6 to 8 mEq over 24 hours    Follow-up sodium in a.m.      Hypokalemia  Assessment & Plan  Replete with IV and p.o. potassium  Follow-up with daily BMP    Replacement as needed    Nausea & vomiting  Assessment & Plan  Continue with IV and p.o. antiemetics  Continue with IV fluids    Started on PPI    Tobacco use- (present on admission)  Assessment & Plan  Patient smokes < 1PPD.  Nicotine patch and/or gum ordered.   I discussed cessation with patient including starting on nicotine patch and/or gum on discharge.  I also discussed medications to help with cessation with patient including Wellbutrin and Chantix, offered, but refused .  Smoking cessation discussed with patient for 4 minutes.    Monitoring      Other hyperlipidemia- (present on admission)  Assessment & Plan  Continue statin          VTE prophylaxis: Lovenox    I have performed a physical exam and reviewed and updated ROS and Plan today (2025). In review of yesterday's note (2025), there are no changes except as documented  above.      I reviewed CBC  I reviewed BMP  I reviewed blood cultures  I reviewed CT abdomen  Patient is on IV ceftriaxone monitor for side effects include but not limited to C. difficile infection interstitial nephritis thrombophlebitis  Reviewed note from ER physician  Reviewed note from admitting doctor  I have ordered blood work for in a.m.  I have discussed with clinical pharmacist  I have reviewed urine analysis    My total time spent caring for the patient on the day of the encounter was 52 minutes.   This does not include time spent on separately billable procedures/tests.

## 2025-06-29 NOTE — CARE PLAN
Problem: Pain - Standard  Goal: Alleviation of pain or a reduction in pain to the patient’s comfort goal  Outcome: Progressing     Problem: Knowledge Deficit - Standard  Goal: Patient and family/care givers will demonstrate understanding of plan of care, disease process/condition, diagnostic tests and medications  Outcome: Progressing     Problem: Fall Risk  Goal: Patient will remain free from falls  Outcome: Progressing   The patient is Stable - Low risk of patient condition declining or worsening    Shift Goals  Clinical Goals: iv abx; monitor labs, vss  Patient Goals: rest comfort  Family Goals: brian    Progress made toward(s) clinical / shift goals:       Patient is not progressing towards the following goals:

## 2025-06-29 NOTE — PROGRESS NOTES
Bedside report received from off going RN/tech: Rain, assumed care of patient.     Fall Risk Score: LOW RISK  Fall risk interventions in place: Place yellow fall risk ID band on patient, Provide patient/family education based on risk assessment, Educate patient/family to call staff for assistance when getting out of bed, Place fall precaution signage outside patient door, Place patient in room close to nursing station, Utilize bed/chair fall alarm, Notify charge of high risk for huddle, and Bed alarm connected correctly  Bed type: Regular (Carmelo Score less than 17 interventions in place)  Patient on cardiac monitor: Yes  IVF/IV medications: Infusion per MAR (List Med(s)) NS@100  Oxygen: How many liters 2L, Traced the line to wall oxygen, and No oxygen tank in room  Bedside sitter: Not Applicable   Isolation: Not applicable

## 2025-06-30 ENCOUNTER — APPOINTMENT (OUTPATIENT)
Dept: RADIOLOGY | Facility: MEDICAL CENTER | Age: 70
DRG: 690 | End: 2025-06-30
Attending: HOSPITALIST
Payer: MEDICARE

## 2025-06-30 VITALS
TEMPERATURE: 97 F | HEART RATE: 79 BPM | WEIGHT: 157.19 LBS | OXYGEN SATURATION: 95 % | HEIGHT: 64 IN | RESPIRATION RATE: 18 BRPM | DIASTOLIC BLOOD PRESSURE: 59 MMHG | BODY MASS INDEX: 26.84 KG/M2 | SYSTOLIC BLOOD PRESSURE: 114 MMHG

## 2025-06-30 PROBLEM — R10.9 ABDOMINAL PAIN: Status: ACTIVE | Noted: 2025-06-30

## 2025-06-30 LAB
ALBUMIN SERPL BCP-MCNC: 2.9 G/DL (ref 3.2–4.9)
ALP SERPL-CCNC: 96 U/L (ref 30–99)
ALT SERPL-CCNC: 10 U/L (ref 2–50)
ANION GAP SERPL CALC-SCNC: 13 MMOL/L (ref 7–16)
AST SERPL-CCNC: 21 U/L (ref 12–45)
BACTERIA BLD CULT: ABNORMAL
BACTERIA UR CULT: ABNORMAL
BACTERIA UR CULT: ABNORMAL
BASOPHILS # BLD AUTO: 0.1 % (ref 0–1.8)
BASOPHILS # BLD: 0.01 K/UL (ref 0–0.12)
BILIRUB CONJ SERPL-MCNC: <0.2 MG/DL (ref 0.1–0.5)
BILIRUB INDIRECT SERPL-MCNC: ABNORMAL MG/DL (ref 0–1)
BILIRUB SERPL-MCNC: <0.2 MG/DL (ref 0.1–1.5)
BUN SERPL-MCNC: 13 MG/DL (ref 8–22)
CALCIUM SERPL-MCNC: 8.3 MG/DL (ref 8.5–10.5)
CHLORIDE SERPL-SCNC: 97 MMOL/L (ref 96–112)
CO2 SERPL-SCNC: 20 MMOL/L (ref 20–33)
CREAT SERPL-MCNC: 0.67 MG/DL (ref 0.5–1.4)
EOSINOPHIL # BLD AUTO: 0.05 K/UL (ref 0–0.51)
EOSINOPHIL NFR BLD: 0.6 % (ref 0–6.9)
ERYTHROCYTE [DISTWIDTH] IN BLOOD BY AUTOMATED COUNT: 47.5 FL (ref 35.9–50)
GFR SERPLBLD CREATININE-BSD FMLA CKD-EPI: 94 ML/MIN/1.73 M 2
GLUCOSE SERPL-MCNC: 158 MG/DL (ref 65–99)
HCT VFR BLD AUTO: 40.4 % (ref 37–47)
HGB BLD-MCNC: 13 G/DL (ref 12–16)
IMM GRANULOCYTES # BLD AUTO: 0.04 K/UL (ref 0–0.11)
IMM GRANULOCYTES NFR BLD AUTO: 0.5 % (ref 0–0.9)
LIPASE SERPL-CCNC: 54 U/L (ref 11–82)
LYMPHOCYTES # BLD AUTO: 0.63 K/UL (ref 1–4.8)
LYMPHOCYTES NFR BLD: 7.5 % (ref 22–41)
MCH RBC QN AUTO: 28 PG (ref 27–33)
MCHC RBC AUTO-ENTMCNC: 32.2 G/DL (ref 32.2–35.5)
MCV RBC AUTO: 87.1 FL (ref 81.4–97.8)
MONOCYTES # BLD AUTO: 0.64 K/UL (ref 0–0.85)
MONOCYTES NFR BLD AUTO: 7.6 % (ref 0–13.4)
NEUTROPHILS # BLD AUTO: 7.06 K/UL (ref 1.82–7.42)
NEUTROPHILS NFR BLD: 83.7 % (ref 44–72)
NRBC # BLD AUTO: 0 K/UL
NRBC BLD-RTO: 0 /100 WBC (ref 0–0.2)
PLATELET # BLD AUTO: 177 K/UL (ref 164–446)
PMV BLD AUTO: 10.4 FL (ref 9–12.9)
POTASSIUM SERPL-SCNC: 3.8 MMOL/L (ref 3.6–5.5)
PROT SERPL-MCNC: 6.2 G/DL (ref 6–8.2)
RBC # BLD AUTO: 4.64 M/UL (ref 4.2–5.4)
SIGNIFICANT IND 70042: ABNORMAL
SITE SITE: ABNORMAL
SODIUM SERPL-SCNC: 130 MMOL/L (ref 135–145)
SOURCE SOURCE: ABNORMAL
WBC # BLD AUTO: 8.4 K/UL (ref 4.8–10.8)

## 2025-06-30 PROCEDURE — 700102 HCHG RX REV CODE 250 W/ 637 OVERRIDE(OP): Performed by: STUDENT IN AN ORGANIZED HEALTH CARE EDUCATION/TRAINING PROGRAM

## 2025-06-30 PROCEDURE — 700105 HCHG RX REV CODE 258: Performed by: HOSPITALIST

## 2025-06-30 PROCEDURE — 700102 HCHG RX REV CODE 250 W/ 637 OVERRIDE(OP): Performed by: HOSPITALIST

## 2025-06-30 PROCEDURE — 85025 COMPLETE CBC W/AUTO DIFF WBC: CPT

## 2025-06-30 PROCEDURE — 83690 ASSAY OF LIPASE: CPT

## 2025-06-30 PROCEDURE — A9270 NON-COVERED ITEM OR SERVICE: HCPCS | Performed by: STUDENT IN AN ORGANIZED HEALTH CARE EDUCATION/TRAINING PROGRAM

## 2025-06-30 PROCEDURE — 80076 HEPATIC FUNCTION PANEL: CPT

## 2025-06-30 PROCEDURE — A9270 NON-COVERED ITEM OR SERVICE: HCPCS | Performed by: HOSPITALIST

## 2025-06-30 PROCEDURE — 99233 SBSQ HOSP IP/OBS HIGH 50: CPT | Performed by: HOSPITALIST

## 2025-06-30 PROCEDURE — 770020 HCHG ROOM/CARE - TELE (206)

## 2025-06-30 PROCEDURE — 700111 HCHG RX REV CODE 636 W/ 250 OVERRIDE (IP): Performed by: HOSPITALIST

## 2025-06-30 PROCEDURE — 80048 BASIC METABOLIC PNL TOTAL CA: CPT

## 2025-06-30 RX ORDER — SUCRALFATE ORAL 1 G/10ML
1 SUSPENSION ORAL
Status: DISCONTINUED | OUTPATIENT
Start: 2025-06-30 | End: 2025-07-01 | Stop reason: HOSPADM

## 2025-06-30 RX ADMIN — NICOTINE 7 MG: 7 PATCH TRANSDERMAL at 04:25

## 2025-06-30 RX ADMIN — SUCRALFATE ORAL SUSPENSION 1 G: 1 SUSPENSION ORAL at 13:40

## 2025-06-30 RX ADMIN — ROSUVASTATIN CALCIUM 40 MG: 20 TABLET, FILM COATED ORAL at 04:25

## 2025-06-30 RX ADMIN — CEFTRIAXONE SODIUM 2000 MG: 10 INJECTION, POWDER, FOR SOLUTION INTRAVENOUS at 04:25

## 2025-06-30 RX ADMIN — OMEPRAZOLE 20 MG: 20 CAPSULE, DELAYED RELEASE ORAL at 04:25

## 2025-06-30 RX ADMIN — SUCRALFATE ORAL SUSPENSION 1 G: 1 SUSPENSION ORAL at 19:57

## 2025-06-30 RX ADMIN — SUCRALFATE ORAL SUSPENSION 1 G: 1 SUSPENSION ORAL at 17:28

## 2025-06-30 ASSESSMENT — ENCOUNTER SYMPTOMS
CHILLS: 0
DEPRESSION: 0
NAUSEA: 0
DOUBLE VISION: 0
BLURRED VISION: 0
COUGH: 0
CLAUDICATION: 0
BACK PAIN: 0
PND: 0
HEADACHES: 0
FEVER: 0
VOMITING: 0
DIZZINESS: 0
HEMOPTYSIS: 0
BRUISES/BLEEDS EASILY: 0
WHEEZING: 0
PALPITATIONS: 0
HEARTBURN: 0
ABDOMINAL PAIN: 1
MYALGIAS: 0

## 2025-06-30 ASSESSMENT — PAIN DESCRIPTION - PAIN TYPE: TYPE: ACUTE PAIN

## 2025-06-30 ASSESSMENT — FIBROSIS 4 INDEX: FIB4 SCORE: 1.94

## 2025-06-30 NOTE — PROGRESS NOTES
Bedside report received from off going RN/tech: Paulette, assumed care of patient.     Fall Risk Score: MODERATE RISK  Fall risk interventions in place: Place yellow fall risk ID band on patient, Provide patient/family education based on risk assessment, Educate patient/family to call staff for assistance when getting out of bed, Place fall precaution signage outside patient door, Place patient in room close to nursing station, Utilize bed/chair fall alarm, and Bed alarm connected correctly  Bed type: Regular (Carmelo Score less than 17 interventions in place)  Patient on cardiac monitor: Yes  IVF/IV medications: Not Applicable   Oxygen: How many liters 1L, Traced the line to wall oxygen, and No oxygen tank in room  Bedside sitter: Not Applicable   Isolation: Not applicable

## 2025-06-30 NOTE — CARE PLAN
The patient is Stable - Low risk of patient condition declining or worsening    Shift Goals  Clinical Goals: VSS, IV Abx, Monitor Labs  Patient Goals: updates, rest  Family Goals: brian    Progress made toward(s) clinical / shift goals:        Problem: Pain - Standard  Goal: Alleviation of pain or a reduction in pain to the patient’s comfort goal  Description: Target End Date:  Prior to discharge or change in level of care    Document on Vitals flowsheet    1.  Document pain using the appropriate pain scale per order or unit policy  2.  Educate and implement non-pharmacologic comfort measures (i.e. relaxation, distraction, massage, cold/heat therapy, etc.)  3.  Pain management medications as ordered  4.  Reassess pain after pain med administration per policy  5.  If opiods administered assess patient's response to pain medication is appropriate per POSS sedation scale  6.  Follow pain management plan developed in collaboration with patient and interdisciplinary team (including palliative care or pain specialists if applicable)  Outcome: Progressing     Problem: Knowledge Deficit - Standard  Goal: Patient and family/care givers will demonstrate understanding of plan of care, disease process/condition, diagnostic tests and medications  Description: Target End Date:  1-3 days or as soon as patient condition allows    Document in Patient Education    1.  Patient and family/caregiver oriented to unit, equipment, visitation policy and means for communicating concern  2.  Complete/review Learning Assessment  3.  Assess knowledge level of disease process/condition, treatment plan, diagnostic tests and medications  4.  Explain disease process/condition, treatment plan, diagnostic tests and medications  Outcome: Progressing     Problem: Fall Risk  Goal: Patient will remain free from falls  Description: Target End Date:  Prior to discharge or change in level of care    Document interventions on the [facility] Fall Risk  Assessment    1.  Assess for fall risk factors  2.  Implement fall precautions  Outcome: Progressing

## 2025-06-30 NOTE — CARE PLAN
Problem: Fall Risk  Goal: Patient will remain free from falls  Outcome: Progressing     Problem: Knowledge Deficit - Standard  Goal: Patient and family/care givers will demonstrate understanding of plan of care, disease process/condition, diagnostic tests and medications  Outcome: Progressing   The patient is Stable - Low risk of patient condition declining or worsening    Shift Goals  Clinical Goals: VSS, Abx, Monitor Labs  Patient Goals: updates, rest  Family Goals: brian    Progress made toward(s) clinical / shift goals:  patient involved in plan of care, fall prevention measures in place    Patient is not progressing towards the following goals: n/a

## 2025-06-30 NOTE — PROGRESS NOTES
Hospital Medicine Daily Progress Note    Date of Service  6/29/2025    Chief Complaint  Natividad Sewell is a 70 y.o. female admitted 6/28/2025 with pyelonephritis    Hospital Course  Natividad Sewell is a 70 y.o. female who presented 6/28/2025 with UTI-like symptoms.  Patient has a past medical history of 40-pack-year smoking history, hypertension, hyperlipidemia, who presented to the emergency department complaining of dysuria since June 25.  Patient states that she has also been experiencing some increased urinary frequency along with nausea and vomiting.  Patient otherwise denies any fevers or chills.  She has been experiencing left-sided flank pain.  In our emergency department, vital signs are stable.  She does have a mild leukocytosis greater than 13,000.  CT imaging was obtained and was concerning for left-sided pyelonephritis.  There is also a 5.8 infrarenal abdominal aortic aneurysm containing a large mural thrombus.  This was discussed with our vascular surgeons, recommended outpatient follow-up     Interval Problem Update  6/29: Patient is new to me today, patient is alert oriented follows commands, complaining of heartburn, positive blood cultures for gram-negative rods, continue ceftriaxone, follow-up final culture result and urinary results discussed with bedside nurse charge nurse  pharmacist.  6/30 patient is resting in bed, patient is alert oriented follows commands, she complain of epigastric/right upper quadrant pain, no fever no chills, no nausea no vomiting, I have ordered ultrasound right upper quadrant, liver function tests are within normal limits, increase PPI to twice daily added sucralfate, urine culture positive for E. coli sensitive to ceftriaxone, blood cultures sensitivity pending, discussed with bedside nurse charge nurse  pharmacist.    I have discussed this patient's plan of care and discharge plan at IDT rounds today with Case Management, Nursing, Nursing  leadership, and other members of the IDT team.    Consultants/Specialty      Code Status  Full Code    Disposition  The patient is not medically cleared for discharge to home or a post-acute facility.      I have placed the appropriate orders for post-discharge needs.    Review of Systems  Review of Systems   Constitutional:  Positive for malaise/fatigue. Negative for chills and fever.   Eyes:  Negative for blurred vision and double vision.   Respiratory:  Negative for cough, hemoptysis and wheezing.    Cardiovascular:  Negative for chest pain, palpitations, claudication, leg swelling and PND.   Gastrointestinal:  Positive for abdominal pain. Negative for heartburn, nausea and vomiting.   Genitourinary:  Negative for hematuria and urgency.   Musculoskeletal:  Negative for back pain and myalgias.   Skin:  Negative for rash.   Neurological:  Negative for dizziness and headaches.   Endo/Heme/Allergies:  Does not bruise/bleed easily.   Psychiatric/Behavioral:  Negative for depression.         Physical Exam  Temp:  [35.7 °C (96.3 °F)-36.4 °C (97.5 °F)] 36.4 °C (97.5 °F)  Pulse:  [73-93] 77  Resp:  [16-22] 18  BP: ()/(57-71) 109/57  SpO2:  [88 %-98 %] 94 %    Physical Exam  Vitals and nursing note reviewed.   Constitutional:       Appearance: Normal appearance.   Eyes:      General: No scleral icterus.  Cardiovascular:      Rate and Rhythm: Normal rate and regular rhythm.      Pulses: Normal pulses.      Heart sounds: Normal heart sounds.   Pulmonary:      Effort: Pulmonary effort is normal. No respiratory distress.   Abdominal:      General: Bowel sounds are normal. There is no distension.      Tenderness: There is abdominal tenderness (Right upper quadrant). There is no guarding or rebound.   Musculoskeletal:         General: Normal range of motion.      Cervical back: Normal range of motion and neck supple.      Right lower leg: No edema.      Left lower leg: No edema.   Skin:     General: Skin is warm and dry.       Capillary Refill: Capillary refill takes less than 2 seconds.      Coloration: Skin is not jaundiced.   Neurological:      General: No focal deficit present.      Mental Status: She is alert and oriented to person, place, and time.      Cranial Nerves: No cranial nerve deficit.   Psychiatric:         Mood and Affect: Mood normal.         Behavior: Behavior normal.         Fluids    Intake/Output Summary (Last 24 hours) at 6/29/2025 1334  Last data filed at 6/29/2025 0800  Gross per 24 hour   Intake 440 ml   Output 550 ml   Net -110 ml        Laboratory  Recent Labs     06/28/25  1216 06/29/25  0103   WBC 13.0* 13.4*   RBC 5.04 5.20   HEMOGLOBIN 14.5 14.6   HEMATOCRIT 43.2 45.7   MCV 85.7 87.9   MCH 28.8 28.1   MCHC 33.6 31.9*   RDW 45.1 47.1   PLATELETCT 205 172   MPV 10.1 10.7     Recent Labs     06/28/25  1216 06/29/25  0619   SODIUM 126* 130*   POTASSIUM 3.5* 4.1   CHLORIDE 91* 97   CO2 20 21   GLUCOSE 138* 147*   BUN 13 15   CREATININE 0.82 0.71   CALCIUM 8.6 8.3*                   Imaging  CT-ABDOMEN-PELVIS WITH   Final Result      1.  Areas of decreased corticomedullary differentiation in the upper pole of the left kidney, concerning for pyelonephritis. Please correlate with urinalysis.   2.  A 5.8 cm infrarenal abdominal aortic aneurysm containing a large mural thrombus.   3.  Advanced atherosclerosis, with moderate to severe stenosis of bilateral common iliac arteries. At least moderate stenosis of the visualized left superficial femoral artery.   4.  A 9 mm hypodensity in hepatic segment 4, in close proximity to falciform ligament. It is indeterminate but statistically benign. Consider outpatient follow-up with contrast-enhanced liver protocol MRI   5.  Colonic diverticulosis.   6.  Tiny hiatal hernia.      DX-CHEST-PORTABLE (1 VIEW)   Final Result      1.  Mild pulmonary edema.   2.  No pneumonia or pneumothorax.   3.  Minimal LEFT lung base atelectasis.           Assessment/Plan  * Acute  pyelonephritis- (present on admission)  Assessment & Plan  Patient presents to the emergency department complaining of several day history of dysuria and flank pain  CT imaging: Areas of decreased corticomedullary differentiation in the upper pole of the left kidney, concerning for pyelonephritis.   Urinalysis positive for UTI  Continue with IV ceftriaxone  Follow-up urine cultures  Continue IV ceftriaxone  Blood cultures were positive  Follow-up final culture results      Hepatic lesion  Assessment & Plan  A 9 mm hypodensity in hepatic segment 4, in close proximity to falciform ligament. It is indeterminate but statistically benign.   outpatient follow-up with contrast-enhanced liver protocol MRI  Follow-up as outpatient    AAA (abdominal aortic aneurysm) (HCC)  Assessment & Plan  CT imagin.8 cm infrarenal abdominal aortic aneurysm containing a large mural thrombus.   Dr. Gutierrez consulted. Recommended outpatient follow up with close blood pressure control     follow-up as outpatient        Elevated troponin  Assessment & Plan  Mildly elevated troponin  Likely secondary to demand ischemia  Follow-up repeat troponin level    Likely due to bacteremia  Troponin trending down      Leukocytosis  Assessment & Plan  Continue with IV antibiotics  Follow-up morning CBC    CBC in a.m.    Hyponatremia  Assessment & Plan  Likely hypovolemic hyponatremia  Continue with IV fluids with normal saline  Follow-up with morning BMP  Avoid overcorrection more than 6 to 8 mEq over 24 hours    Follow-up sodium in a.m.      Hypokalemia  Assessment & Plan  Replete with IV and p.o. potassium  Follow-up with daily BMP    Replacement as needed    Nausea & vomiting  Assessment & Plan  Continue with IV and p.o. antiemetics  Continue with IV fluids    Started on PPI    Tobacco use- (present on admission)  Assessment & Plan  Patient smokes < 1PPD.  Nicotine patch and/or gum ordered.   I discussed cessation with patient including starting on  nicotine patch and/or gum on discharge.  I also discussed medications to help with cessation with patient including Wellbutrin and Chantix, offered, but refused .  Smoking cessation discussed with patient for 4 minutes.    Monitoring      Other hyperlipidemia- (present on admission)  Assessment & Plan  Continue statin          VTE prophylaxis: Lovenox    I have performed a physical exam and reviewed and updated ROS and Plan today (6/29/2025). In review of yesterday's note (6/28/2025), there are no changes except as documented above.      I reviewed CBC  Review BMP  I ordered and reviewed liver function test  I ordered liver ultrasound  Patient is on IV ceftriaxone monitoring for side effects include but not limited to C. difficile infection that is not nephritis  Review urine culture result positive for E. coli pansensitive  Review blood culture result positive for E. coli sensitivity pending  Ordered CBC BMP for in a.m.  Discussed with clinical pharmacist      .My total time spent caring for the patient on the day of the encounter was 53 minutes.   This does not include time spent on separately billable procedures/tests.

## 2025-06-30 NOTE — PROGRESS NOTES
Bedside report received from off going RN/tech: Rose, assumed care of patient.     Fall Risk Score: LOW RISK  Fall risk interventions in place: Provide patient/family education based on risk assessment and Educate patient/family to call staff for assistance when getting out of bed  Bed type: Regular (Carmelo Score less than 17 interventions in place)  Patient on cardiac monitor: Yes  IVF/IV medications: Not Applicable   Oxygen: How many liters 1L, Traced the line to wall oxygen, and No oxygen tank in room  Bedside sitter: Not Applicable   Isolation: Not applicable

## 2025-06-30 NOTE — ASSESSMENT & PLAN NOTE
Complaining of epigastric right upper quadrant pain  I have ordered liver function test  Started on sucralfate increase PPI dose to twice daily  Ultrasound liver ordered

## 2025-07-01 ENCOUNTER — PHARMACY VISIT (OUTPATIENT)
Dept: PHARMACY | Facility: MEDICAL CENTER | Age: 70
End: 2025-07-01
Payer: COMMERCIAL

## 2025-07-01 VITALS
TEMPERATURE: 97 F | OXYGEN SATURATION: 93 % | HEIGHT: 64 IN | HEART RATE: 64 BPM | SYSTOLIC BLOOD PRESSURE: 114 MMHG | DIASTOLIC BLOOD PRESSURE: 67 MMHG | BODY MASS INDEX: 26.84 KG/M2 | WEIGHT: 157.19 LBS | RESPIRATION RATE: 18 BRPM

## 2025-07-01 LAB
BACTERIA BLD CULT: ABNORMAL
SIGNIFICANT IND 70042: ABNORMAL
SIGNIFICANT IND 70042: ABNORMAL
SITE SITE: ABNORMAL
SITE SITE: ABNORMAL
SOURCE SOURCE: ABNORMAL
SOURCE SOURCE: ABNORMAL

## 2025-07-01 PROCEDURE — 700111 HCHG RX REV CODE 636 W/ 250 OVERRIDE (IP): Performed by: HOSPITALIST

## 2025-07-01 PROCEDURE — 99239 HOSP IP/OBS DSCHRG MGMT >30: CPT | Performed by: STUDENT IN AN ORGANIZED HEALTH CARE EDUCATION/TRAINING PROGRAM

## 2025-07-01 PROCEDURE — 76705 ECHO EXAM OF ABDOMEN: CPT

## 2025-07-01 PROCEDURE — 700102 HCHG RX REV CODE 250 W/ 637 OVERRIDE(OP): Performed by: HOSPITALIST

## 2025-07-01 PROCEDURE — A9270 NON-COVERED ITEM OR SERVICE: HCPCS | Performed by: STUDENT IN AN ORGANIZED HEALTH CARE EDUCATION/TRAINING PROGRAM

## 2025-07-01 PROCEDURE — 700105 HCHG RX REV CODE 258: Performed by: HOSPITALIST

## 2025-07-01 PROCEDURE — A9270 NON-COVERED ITEM OR SERVICE: HCPCS | Performed by: HOSPITALIST

## 2025-07-01 PROCEDURE — RXMED WILLOW AMBULATORY MEDICATION CHARGE: Performed by: STUDENT IN AN ORGANIZED HEALTH CARE EDUCATION/TRAINING PROGRAM

## 2025-07-01 PROCEDURE — 700102 HCHG RX REV CODE 250 W/ 637 OVERRIDE(OP): Performed by: STUDENT IN AN ORGANIZED HEALTH CARE EDUCATION/TRAINING PROGRAM

## 2025-07-01 RX ORDER — UMECLIDINIUM BROMIDE AND VILANTEROL TRIFENATATE 62.5; 25 UG/1; UG/1
1 POWDER RESPIRATORY (INHALATION) DAILY
Qty: 60 EACH | Refills: 2
Start: 2025-07-01

## 2025-07-01 RX ORDER — SULFAMETHOXAZOLE AND TRIMETHOPRIM 800; 160 MG/1; MG/1
1 TABLET ORAL 2 TIMES DAILY
Qty: 24 TABLET | Refills: 0 | Status: ACTIVE | OUTPATIENT
Start: 2025-07-01 | End: 2025-07-01

## 2025-07-01 RX ORDER — SULFAMETHOXAZOLE AND TRIMETHOPRIM 800; 160 MG/1; MG/1
1 TABLET ORAL 2 TIMES DAILY
Qty: 24 TABLET | Refills: 0 | Status: ACTIVE | OUTPATIENT
Start: 2025-07-02 | End: 2025-07-13

## 2025-07-01 RX ORDER — ROSUVASTATIN CALCIUM 40 MG/1
40 TABLET, COATED ORAL DAILY
Qty: 90 TABLET | Refills: 3
Start: 2025-07-01 | End: 2025-07-10 | Stop reason: SDUPTHER

## 2025-07-01 RX ADMIN — SUCRALFATE ORAL SUSPENSION 1 G: 1 SUSPENSION ORAL at 08:05

## 2025-07-01 RX ADMIN — CEFTRIAXONE SODIUM 2000 MG: 10 INJECTION, POWDER, FOR SOLUTION INTRAVENOUS at 04:46

## 2025-07-01 RX ADMIN — NICOTINE 7 MG: 7 PATCH TRANSDERMAL at 04:46

## 2025-07-01 RX ADMIN — UMECLIDINIUM BROMIDE AND VILANTEROL TRIFENATATE 1 PUFF: 62.5; 25 POWDER RESPIRATORY (INHALATION) at 08:06

## 2025-07-01 RX ADMIN — OMEPRAZOLE 20 MG: 20 CAPSULE, DELAYED RELEASE ORAL at 04:46

## 2025-07-01 RX ADMIN — ROSUVASTATIN CALCIUM 40 MG: 20 TABLET, FILM COATED ORAL at 04:46

## 2025-07-01 ASSESSMENT — FIBROSIS 4 INDEX: FIB4 SCORE: 2.59

## 2025-07-01 NOTE — DISCHARGE SUMMARY
Discharge Summary    CHIEF COMPLAINT ON ADMISSION  Chief Complaint   Patient presents with    N/V     Since 6/25    UTI     Pt reports dysuria since 6/25       Reason for Admission  abdominal pain     Admission Date  6/28/2025    CODE STATUS  Prior    HPI & HOSPITAL COURSE    Natividad Sewell is a 70 year old female with PMHx tobacco dependence, 40-pack-year, HTN, HLD.  Admitted 6/28 for dysuria.    Per history: Patient has had dysuria since 6/25.  Has been associated with frequency, nausea and vomiting.  She developed left send flank pain.    In the ED: WBC 13.  CT A/P: Differentiation noted in the upper left pole of the kidney, concerning for pyelonephritis.  5.8 cm infrarenal AAA with a large mural thrombus.  Advanced atherosclerosis.  UA was notable for significant UTI.  Patient was started on IV Ancef.    Both urine and blood cultures positive for E. coli.  Transition from Ancef to ceftriaxone.  Sensitivities obtained and show pansensitive E. coli.  Patient was transition to an additional 12-day course of Bactrim.    Vascular surgery was consulted for large AAA.  Recommending outpatient follow-up.    Additionally, patient was noted to have gallbladder polyp versus mass.  I referred her to general surgery in the outpatient setting for further evaluation.    Therefore, she is discharged in fair and stable condition to home with close outpatient follow-up.    The patient met 2-midnight criteria for an inpatient stay at the time of discharge.    Discharge Date  7/1/2025    FOLLOW UP ITEMS POST DISCHARGE  Follow-up with vascular surgery-1 week  Follow-up with general surgery-1 week    DISCHARGE DIAGNOSES  Principal Problem:    Acute pyelonephritis (POA: Yes)  Active Problems:    Other hyperlipidemia (POA: Yes)    Tobacco use (POA: Yes)    Nausea & vomiting (POA: Unknown)    Hypokalemia (POA: Unknown)    Hyponatremia (POA: Unknown)    Leukocytosis (POA: Unknown)    Elevated troponin (POA: Unknown)    AAA (abdominal aortic  aneurysm) (HCC) (POA: Unknown)    Hepatic lesion (POA: Unknown)    Abdominal pain (POA: Unknown)  Resolved Problems:    * No resolved hospital problems. *      FOLLOW UP  No future appointments.  [facility]  [address]  [place] [address]  [phone]  Follow up in 1 week(s)      [facility]  [address]  [place] [address]  [phone]  Follow up in 1 week(s)        MEDICATIONS ON DISCHARGE     Medication List        START taking these medications        Instructions   sulfamethoxazole-trimethoprim 800-160 MG tablet  Start taking on: July 2, 2025  Commonly known as: Bactrim DS   Starting 7/2/2025, Take 1 Tablet by mouth 2 times a day for 12 days.  Dose: 1 Tablet            CHANGE how you take these medications        Instructions   rosuvastatin 40 MG tablet  What changed: how much to take  Commonly known as: Crestor   Take 1 Tablet by mouth every day.  Dose: 40 mg            CONTINUE taking these medications        Instructions   umeclidinium-vilanterol 62.5-25 MCG/ACT Aepb inhaler  Commonly known as: Anoro Ellipta   Inhale 1 Puff every day.  Dose: 1 Puff              Allergies  Allergies[1]    DIET  No orders of the defined types were placed in this encounter.      ACTIVITY  As tolerated.  Weight bearing as tolerated    CONSULTATIONS  None     PROCEDURES  None     LABORATORY  Lab Results   Component Value Date    SODIUM 130 (L) 06/30/2025    POTASSIUM 3.8 06/30/2025    CHLORIDE 97 06/30/2025    CO2 20 06/30/2025    GLUCOSE 158 (H) 06/30/2025    BUN 13 06/30/2025    CREATININE 0.67 06/30/2025        Lab Results   Component Value Date    WBC 8.4 06/30/2025    HEMOGLOBIN 13.0 06/30/2025    HEMATOCRIT 40.4 06/30/2025    PLATELETCT 177 06/30/2025      US-RUQ   Final Result         1.  Large abdominal aortic aneurysm, radiographic follow-up and surrounds recommended as clinically appropriate.   2.  Echogenic mass abutting the gallbladder wall, could  "represent large polyp versus other gallbladder wall mass.      CT-ABDOMEN-PELVIS WITH   Final Result      1.  Areas of decreased corticomedullary differentiation in the upper pole of the left kidney, concerning for pyelonephritis. Please correlate with urinalysis.   2.  A 5.8 cm infrarenal abdominal aortic aneurysm containing a large mural thrombus.   3.  Advanced atherosclerosis, with moderate to severe stenosis of bilateral common iliac arteries. At least moderate stenosis of the visualized left superficial femoral artery.   4.  A 9 mm hypodensity in hepatic segment 4, in close proximity to falciform ligament. It is indeterminate but statistically benign. Consider outpatient follow-up with contrast-enhanced liver protocol MRI   5.  Colonic diverticulosis.   6.  Tiny hiatal hernia.      DX-CHEST-PORTABLE (1 VIEW)   Final Result      1.  Mild pulmonary edema.   2.  No pneumonia or pneumothorax.   3.  Minimal LEFT lung base atelectasis.            Total time of the discharge process exceeds 40 minutes.         [1]   Allergies  Allergen Reactions    Morphine      \"I don't know, it was during my recovery after the endarterectomy-they told me I had a bad reaction to it\"     "

## 2025-07-01 NOTE — PROGRESS NOTES
Monitor Summary  Rhythm: sinus rhythm  Rate: 60-69  Ectopy: PVC (rare)  .15 / .08 / .47

## 2025-07-01 NOTE — PROGRESS NOTES
Discharge orders received.  Patient arrived to the discharge lounge.  PIV removed by bedside RN prior to arrival. AVS instructions given, medications reviewed and general discharge education provided to patient.  Follow up appointments discussed.  Patient verbalized understanding of dc instructions and prescriptions.  Patient signed discharge instructions.  Patient verbalized understanding and had all belongings with her.  Patient left pending meds. Wished patient a speedy recovery.

## 2025-07-01 NOTE — PROGRESS NOTES
Bedside report received from RN: Rain    A & O x 4  Oxygen: Room Air  Fall Risk Score: MODERATE RISK  Fall risk interventions in place: Provide patient/family education based on risk assessment, Educate patient/family to call staff for assistance when getting out of bed, Place fall precaution signage outside patient door, Utilize bed/chair fall alarm, and Bed alarm connected correctly  Bed type: Regular (Carmelo Score less than 17 interventions in place)  Patient on cardiac monitor: Yes - SR  IVF/IV medications: Not Applicable   Bedside sitter: Not Applicable   Isolation: Not applicable    Assumed care of patient.

## 2025-07-01 NOTE — PROGRESS NOTES
Bedside report received from off going RN/tech: Hemalatha, assumed care of patient.     Fall Risk Score: MODERATE RISK  Fall risk interventions in place: Place yellow fall risk ID band on patient, Provide patient/family education based on risk assessment, Educate patient/family to call staff for assistance when getting out of bed, Place fall precaution signage outside patient door, Place patient in room close to nursing station, Utilize bed/chair fall alarm, and Bed alarm connected correctly  Bed type: Regular (Carmelo Score less than 17 interventions in place)  Patient on cardiac monitor: Yes, SR  IVF/IV medications: Not Applicable   Oxygen: Room Air  Bedside sitter: Not Applicable   Isolation: Not applicable

## 2025-07-01 NOTE — CARE PLAN
Problem: Knowledge Deficit - Standard  Goal: Patient and family/care givers will demonstrate understanding of plan of care, disease process/condition, diagnostic tests and medications  Outcome: Progressing  Note: Discuss and review POC with patient/family. Re-educate as needed.       Problem: Fall Risk  Goal: Patient will remain free from falls  Outcome: Progressing  Note: Treaded socks and bed/strip alarm on, side rails up x 3. Call light within reach. Pt educated to call for assistance. Reinforce as needed.      The patient is Stable - Low risk of patient condition declining or worsening    Shift Goals: Discharge instructions   Clinical Goals: Discharge planning  Patient Goals: Discharge home  Family Goals: ANAY    Progress made toward(s) clinical / shift goals:  ABX therapy     Patient is not progressing towards the following goals: lost shoes

## 2025-07-01 NOTE — CARE PLAN
The patient is Stable - Low risk of patient condition declining or worsening    Shift Goals  Clinical Goals: VSS, pain control, monitor labs  Patient Goals: Updates, rest  Family Goals: ANAY    Progress made toward(s) clinical / shift goals:    Problem: Knowledge Deficit - Standard  Goal: Patient and family/care givers will demonstrate understanding of plan of care, disease process/condition, diagnostic tests and medications  Outcome: Progressing  Note: Pt updated on POC, all current questions answered at this time       Problem: Fall Risk  Goal: Patient will remain free from falls  Outcome: Progressing  Note: Treaded socks and bed/strip alarm on, side rails up x 2. Call light within reach. Pt educated to call for assistance. Reinforce as needed.          Patient is not progressing towards the following goals:

## 2025-07-01 NOTE — PROGRESS NOTES
Patient transferred to the discharge lounge. All personal belongings collected. IV access removed. Monitor removed, monitor room notified. Discharge instructions discussed. Medications reviewed. Follow up appointments scheduled. Patient escorted off unit via wheelchair without incident.

## 2025-07-10 ENCOUNTER — OFFICE VISIT (OUTPATIENT)
Dept: CARDIOLOGY | Facility: MEDICAL CENTER | Age: 70
End: 2025-07-10
Payer: MEDICARE

## 2025-07-10 VITALS
DIASTOLIC BLOOD PRESSURE: 60 MMHG | WEIGHT: 153 LBS | OXYGEN SATURATION: 98 % | BODY MASS INDEX: 26.12 KG/M2 | HEART RATE: 82 BPM | SYSTOLIC BLOOD PRESSURE: 156 MMHG | HEIGHT: 64 IN | RESPIRATION RATE: 18 BRPM

## 2025-07-10 DIAGNOSIS — Z95.818 STATUS POST PLACEMENT OF IMPLANTABLE LOOP RECORDER: ICD-10-CM

## 2025-07-10 DIAGNOSIS — E78.2 MIXED HYPERLIPIDEMIA: Primary | ICD-10-CM

## 2025-07-10 DIAGNOSIS — I10 HYPERTENSION, UNSPECIFIED TYPE: ICD-10-CM

## 2025-07-10 DIAGNOSIS — I63.9 CEREBROVASCULAR ACCIDENT (CVA), UNSPECIFIED MECHANISM (HCC): ICD-10-CM

## 2025-07-10 DIAGNOSIS — I63.511 CEREBROVASCULAR ACCIDENT (CVA) DUE TO OCCLUSION OF RIGHT MIDDLE CEREBRAL ARTERY (HCC): ICD-10-CM

## 2025-07-10 DIAGNOSIS — I71.40 ABDOMINAL AORTIC ANEURYSM (AAA) WITHOUT RUPTURE, UNSPECIFIED PART (HCC): ICD-10-CM

## 2025-07-10 DIAGNOSIS — E78.00 PURE HYPERCHOLESTEROLEMIA: ICD-10-CM

## 2025-07-10 DIAGNOSIS — Z72.0 TOBACCO USE: ICD-10-CM

## 2025-07-10 DIAGNOSIS — I34.1 MITRAL VALVE PROLAPSE: ICD-10-CM

## 2025-07-10 PROCEDURE — 99214 OFFICE O/P EST MOD 30 MIN: CPT

## 2025-07-10 PROCEDURE — 3078F DIAST BP <80 MM HG: CPT

## 2025-07-10 PROCEDURE — 3077F SYST BP >= 140 MM HG: CPT

## 2025-07-10 RX ORDER — OLMESARTAN MEDOXOMIL 20 MG/1
20 TABLET ORAL DAILY
Qty: 100 TABLET | Refills: 3 | Status: SHIPPED | OUTPATIENT
Start: 2025-07-10 | End: 2026-08-14

## 2025-07-10 RX ORDER — ROSUVASTATIN CALCIUM 40 MG/1
40 TABLET, COATED ORAL DAILY
Qty: 90 TABLET | Refills: 3 | Status: SHIPPED | OUTPATIENT
Start: 2025-07-10

## 2025-07-10 RX ORDER — ASPIRIN 81 MG/1
81 TABLET, CHEWABLE ORAL DAILY
COMMUNITY

## 2025-07-10 ASSESSMENT — ENCOUNTER SYMPTOMS
DYSPNEA ON EXERTION: 0
NEAR-SYNCOPE: 0
LIGHT-HEADEDNESS: 0
HEADACHES: 0
PND: 0
SYNCOPE: 0
ORTHOPNEA: 0
DIZZINESS: 0
PALPITATIONS: 0
SHORTNESS OF BREATH: 0

## 2025-07-10 ASSESSMENT — FIBROSIS 4 INDEX: FIB4 SCORE: 2.59

## 2025-07-10 NOTE — PATIENT INSTRUCTIONS
START TAKING BLOOD PRESSURE AT HOME AND KEEP A LOG, GOAL LESS THAN 130/80    START OLMESARTAN 20 MG DAILY IF BLOOD PRESSURE     START CRESTOR 40 EVENING

## 2025-07-10 NOTE — PROGRESS NOTES
Chief Complaint   Patient presents with    Hypertension     Follow up           Subjective:   Natividad Sewell is a 70 y.o. female who presents today for follow-up.     Patient of Dr. Anand.  Current medical problems include CVA, HLD, prior loop recordered. Their last clinic visit was 2/27/2023 with Dr. Anand.    She was hospitlaized from 6/28/2025-7/1/2025 after presenting with concern for pyelonephritis. During CT imaging she was found to have a 5.8 cm infrarenal abdominal aortic aneurysm containing a large mural thrombus. Vascular sugergy was consulted who recommended outpatient follow up and close BP control.     Today's visit:  Patient presents today for follow up with her daughter. She reports that since being discharged from the hospital has been feeling slightly better but overwhelmed. She has no chest pain, shortness of breath, edema, orthopnea, PND, dizziness/lightheadedness, or palpitations. She has not yet started her rosuvastatin as she did not receive it. She does not currently take her blood pressure at home or have a cuff. She just quit smoking since being out of the hospital and is now wanting patches to help her quit. She has not yet scheduled follow up with vascular surgery related to her AAA.     Cardiovascular Risk Factors:  1. Smoking status: Former smoker Just quit this month  2. Type II Diabetes Mellitus: No Prediabetes  Lab Results   Component Value Date/Time    HBA1C 5.7 (H) 06/28/2025 12:16 PM    HBA1C 6.1 (H) 02/14/2023 04:28 AM     3. Hypertension: Yes  4. Dyslipidemia: Yes   Cholesterol,Tot   Date Value Ref Range Status   02/14/2023 225 (H) 100 - 199 mg/dL Final     LDL   Date Value Ref Range Status   02/14/2023 145 (H) <100 mg/dL Final     HDL   Date Value Ref Range Status   02/14/2023 53 >=40 mg/dL Final     Triglycerides   Date Value Ref Range Status   02/14/2023 133 0 - 149 mg/dL Final       Past Medical History[1]      No family history on file.      Social  "History[2]      Allergies[3]      Current Outpatient Medications   Medication Sig    rosuvastatin (CRESTOR) 40 MG tablet Take 1 Tablet by mouth every day.    olmesartan (BENICAR) 20 MG Tab Take 1 Tablet by mouth every day.    nicotine (NICODERM CQ) 7 MG/24HR PATCH 24 HR Place 1 Patch on the skin every 24 hours.    umeclidinium-vilanterol (ANORO ELLIPTA) 62.5-25 MCG/ACT AEROSOL POWDER, BREATH ACTIVATED inhaler Inhale 1 Puff every day.    sulfamethoxazole-trimethoprim (BACTRIM DS) 800-160 MG tablet Starting 7/2/2025, Take 1 Tablet by mouth 2 times a day for 12 days.         Review of Systems   Constitutional: Negative for malaise/fatigue.   Cardiovascular:  Negative for chest pain, dyspnea on exertion, leg swelling, near-syncope, orthopnea, palpitations, paroxysmal nocturnal dyspnea and syncope.   Respiratory:  Negative for shortness of breath.    Neurological:  Negative for dizziness, headaches and light-headedness.           Objective:   BP (!) 156/60 (BP Location: Left arm, Patient Position: Sitting)   Pulse 82   Resp 18   Ht 1.626 m (5' 4\")   Wt 69.4 kg (153 lb)   SpO2 98%  Body mass index is 26.26 kg/m².         Physical Exam  Vitals reviewed.   Constitutional:       General: She is not in acute distress.     Appearance: Normal appearance.   HENT:      Head: Normocephalic and atraumatic.   Cardiovascular:      Rate and Rhythm: Normal rate and regular rhythm.      Pulses: Normal pulses.      Heart sounds: Murmur heard.   Pulmonary:      Effort: Pulmonary effort is normal. No respiratory distress.      Breath sounds: Normal breath sounds.   Musculoskeletal:      Right lower leg: No edema.      Left lower leg: No edema.   Neurological:      Mental Status: She is alert and oriented to person, place, and time.      Gait: Gait normal.   Psychiatric:         Behavior: Behavior normal.           Lab Results   Component Value Date/Time    SODIUM 130 (L) 06/30/2025 10:04 AM    POTASSIUM 3.8 06/30/2025 10:04 AM    " CHLORIDE 97 06/30/2025 10:04 AM    CO2 20 06/30/2025 10:04 AM    GLUCOSE 158 (H) 06/30/2025 10:04 AM    BUN 13 06/30/2025 10:04 AM    CREATININE 0.67 06/30/2025 10:04 AM      Lab Results   Component Value Date/Time    WBC 8.4 06/30/2025 10:04 AM    RBC 4.64 06/30/2025 10:04 AM    HEMOGLOBIN 13.0 06/30/2025 10:04 AM    HEMATOCRIT 40.4 06/30/2025 10:04 AM    MCV 87.1 06/30/2025 10:04 AM    MCH 28.0 06/30/2025 10:04 AM    MCHC 32.2 06/30/2025 10:04 AM    MPV 10.4 06/30/2025 10:04 AM    NEUTSPOLYS 83.70 (H) 06/30/2025 10:04 AM    LYMPHOCYTES 7.50 (L) 06/30/2025 10:04 AM    MONOCYTES 7.60 06/30/2025 10:04 AM    EOSINOPHILS 0.60 06/30/2025 10:04 AM    BASOPHILS 0.10 06/30/2025 10:04 AM      Lab Results   Component Value Date/Time    CHOLSTRLTOT 225 (H) 02/14/2023 04:28 AM     (H) 02/14/2023 04:28 AM    HDL 53 02/14/2023 04:28 AM    TRIGLYCERIDE 133 02/14/2023 04:28 AM       Lab Results   Component Value Date/Time    TROPONINT 36 (H) 06/29/2025 0619     Lab Results   Component Value Date/Time    NTPROBNP 23063 (H) 06/28/2025 1216     Assessment:   1. Hypertension, unspecified type  - olmesartan (BENICAR) 20 MG Tab; Take 1 Tablet by mouth every day.  Dispense: 100 Tablet; Refill: 3    2. Mixed hyperlipidemia  - Lipid Profile; Future  - Lipoprotein (a); Future    3. Cerebrovascular accident (CVA), unspecified mechanism (HCC)  - rosuvastatin (CRESTOR) 40 MG tablet; Take 1 Tablet by mouth every day.  Dispense: 90 Tablet; Refill: 3    4. Pure hypercholesterolemia  - rosuvastatin (CRESTOR) 40 MG tablet; Take 1 Tablet by mouth every day.  Dispense: 90 Tablet; Refill: 3    5. Tobacco use  - Referral to establish with PCP  - nicotine (NICODERM CQ) 7 MG/24HR PATCH 24 HR; Place 1 Patch on the skin every 24 hours.  Dispense: 30 Patch; Refill: 6    6. Cerebrovascular accident (CVA) due to occlusion of right middle cerebral artery (HCC)  - Referral to establish with PCP    7. Mitral valve prolapse  - EC-ECHOCARDIOGRAM COMPLETE  W/O CONT; Future    8. Abdominal aortic aneurysm (AAA) without rupture, unspecified part (HCC)        Medical Decision Making:  Today's Assessment / Plan:   Hypertension  - Poor control in office and does not currently taking blood pressure at home  - start olmesartan 20 mg daily  - goal < 130/80, due to AAA ideally less than 120/80  -get home blood pressure cuff, recommended [employer] upper arm series 3. Start taking blood pressure at home and keep a log.     Hyperlipidemia  -Most recent   -Start rosuvastatin 40 mg every evening  -Goal of less than 70  -Check lipid panel in 3 months including LPa, if not at goal will start zetia or can discus [id number]    AAA  Mural thrombus  -patient has not yet scheduled follow up with vascular medicine yet, gave patient number and information to schedule outpatient follow up to discuss plan and possible intervention for AAA measuring 5.8 cm  -strict BP control as above    CVA  Loop recorder  -start rosuvastatin 40 mg every evening  -start asa 81 mg daily  -patient only had one transmission of her loop recorder but does still have it in place. Will discuss discontinuation at next follow up as patient voiced interest in having it removed    Mitral valve prolapse  -repeat echocardiogram ordered to evaluate progression of valvular disease    Tobacco abuse  -Patient with over a 40 year pack smoking history, just quit after last hospitalization. She is wanting to try the patches to help her be successful. Discussed the importance of continued smoking cessation and patient verbalized understanding.     Return in about 4 weeks (around 8/7/2025) for Jodi LLANOS.  Sooner if problems.    VIVI Sanchez              [1]   Past Medical History:  Diagnosis Date    Arthritis     Dental disorder 03/16/2023    upper/lowers full plates    High cholesterol 03/16/2023    medicated    Hypertension     pt denies- not taking any medication currently    Migraines, neuralgic      "Mitral valve prolapse     Stroke ([facility]) 06/04/2009    no residual weaknesses or problems    Stroke ([facility]) 03/16/2023    right eye stroke in 2/13/23   [2]   Social History  Tobacco Use    Smoking status: Every Day     Current packs/day: 1.00     Average packs/day: 1 pack/day for 44.8 years (44.8 ttl pk-yrs)     Types: Cigarettes     Start date: 9/16/1980    Smokeless tobacco: Never   Vaping Use    Vaping status: Never Used   Substance Use Topics    Alcohol use: No    Drug use: Yes     Types: Inhaled, Marijuana     Comment: smokes POT at bedtime, one joint lasts 2 days   [3]   Allergies  Allergen Reactions    Morphine      \"I don't know, it was during my recovery after the endarterectomy-they told me I had a bad reaction to it\"     "

## 2025-07-25 ENCOUNTER — HOSPITAL ENCOUNTER (OUTPATIENT)
Dept: LAB | Facility: MEDICAL CENTER | Age: 70
End: 2025-07-25
Payer: MEDICARE

## 2025-07-25 DIAGNOSIS — E78.2 MIXED HYPERLIPIDEMIA: ICD-10-CM

## 2025-07-25 LAB
CHOLEST SERPL-MCNC: 156 MG/DL (ref 100–199)
HDLC SERPL-MCNC: 50 MG/DL
LDLC SERPL CALC-MCNC: 87 MG/DL
TRIGL SERPL-MCNC: 96 MG/DL (ref 0–149)

## 2025-07-25 PROCEDURE — 80061 LIPID PANEL: CPT

## 2025-07-25 PROCEDURE — 36415 COLL VENOUS BLD VENIPUNCTURE: CPT

## 2025-07-25 PROCEDURE — 83695 ASSAY OF LIPOPROTEIN(A): CPT

## 2025-07-28 ENCOUNTER — RESULTS FOLLOW-UP (OUTPATIENT)
Dept: CARDIOLOGY | Facility: MEDICAL CENTER | Age: 70
End: 2025-07-28
Payer: MEDICARE

## 2025-07-28 DIAGNOSIS — E78.2 MIXED HYPERLIPIDEMIA: Primary | ICD-10-CM

## 2025-07-28 DIAGNOSIS — I63.9 CEREBROVASCULAR ACCIDENT (CVA), UNSPECIFIED MECHANISM (HCC): ICD-10-CM

## 2025-07-28 LAB — LPA SERPL-MCNC: 34 MG/DL

## 2025-07-29 ENCOUNTER — OFFICE VISIT (OUTPATIENT)
Facility: MEDICAL CENTER | Age: 70
End: 2025-07-29
Payer: MEDICARE

## 2025-07-29 VITALS
OXYGEN SATURATION: 93 % | HEART RATE: 73 BPM | HEIGHT: 64 IN | DIASTOLIC BLOOD PRESSURE: 62 MMHG | BODY MASS INDEX: 26.72 KG/M2 | SYSTOLIC BLOOD PRESSURE: 118 MMHG | WEIGHT: 156.53 LBS | TEMPERATURE: 97.6 F

## 2025-07-29 DIAGNOSIS — I71.43 INFRARENAL ABDOMINAL AORTIC ANEURYSM (AAA) WITHOUT RUPTURE (HCC): Primary | ICD-10-CM

## 2025-07-29 DIAGNOSIS — I65.21 CAROTID ARTERY STENOSIS, ASYMPTOMATIC, RIGHT: ICD-10-CM

## 2025-07-29 RX ORDER — EZETIMIBE 10 MG/1
10 TABLET ORAL DAILY
Qty: 90 TABLET | Refills: 1 | OUTPATIENT
Start: 2025-07-29

## 2025-07-29 ASSESSMENT — FIBROSIS 4 INDEX: FIB4 SCORE: 2.59

## 2025-07-29 NOTE — H&P
Vascular Surgery            New Patient Consultation    Patient:Natividad Sewell  MRN:[id number]  Primary care physician:Pcp Pt States None  Referring Provider: Pcp Pt States None  PCP - General     Vascular Consultant: Hank Mukherjee MD    Date: 7/29/2025  _____________________________________________________    Chief Complaint:     AAA    History of Present Illness:   Natividad Sewell  is a 70 y.o. year old female who I help manage several years ago for carotid disease.  She was initially operated on by Dr. Gutierrez with a carotid endarterectomy.  She experienced a recurrent stenosis within that right carotid artery and she was sent for a carotid stent.  She underwent that stent procedure without complication but has had no follow-up or surveillance.  She was recently diagnosed with an abdominal aortic aneurysm that has been referred for that reason.  The aneurysm is asymptomatic measures approximately 5.8 cm.    Past Medical History:   Past Medical History[1]  Past Surgical History:   Past Surgical History[2]  Allergies:   Allergies[3]  Medications:   Encounter Medications[4]  Social History:     Social History     Socioeconomic History    Marital status: Single     Spouse name: Not on file    Number of children: Not on file    Years of education: Not on file    Highest education level: Not on file   Occupational History    Not on file   Tobacco Use    Smoking status: Every Day     Current packs/day: 1.00     Average packs/day: 1 pack/day for 44.9 years (44.9 ttl pk-yrs)     Types: Cigarettes     Start date: 9/16/1980    Smokeless tobacco: Never   Vaping Use    Vaping status: Never Used   Substance and Sexual Activity    Alcohol use: No    Drug use: Yes     Types: Inhaled, Marijuana     Comment: smokes POT at bedtime, one joint lasts 2 days    Sexual activity: Not on file   Other Topics Concern    Not on file   Social History Narrative    Not on file     Social Drivers of Health     Financial Resource  Strain: Not on file   Food Insecurity: No Food Insecurity (2025)    Hunger Vital Sign     Worried About Running Out of Food in the Last Year: Never true     Ran Out of Food in the Last Year: Never true   Transportation Needs: No Transportation Needs (2025)    PRAPARE - Transportation     Lack of Transportation (Medical): No     Lack of Transportation (Non-Medical): No   Physical Activity: Not on file   Stress: Not on file   Social Connections: Not on file   Intimate Partner Violence: Not At Risk (2025)    Humiliation, Afraid, Rape, and Kick questionnaire     Fear of Current or Ex-Partner: No     Emotionally Abused: No     Physically Abused: No     Sexually Abused: No   Housing Stability: Low Risk  (2025)    Housing Stability Vital Sign     Unable to Pay for Housing in the Last Year: No     Number of Times Moved in the Last Year: 0     Homeless in the Last Year: No      Tobacco Use History[5]  Social History     Substance and Sexual Activity   Alcohol Use No     Social History     Substance and Sexual Activity   Drug Use Yes    Types: Inhaled, Marijuana    Comment: smokes POT at bedtime, one joint lasts 2 days      Family History:   No family history on file.    Review of Systems:   Constitutional:   Reports -no complaints    Denies Chest pain   Denies SOB   Denies abdominal pain   Denies focal neuro deficits      Exam:   [facility] 2009     Constitutional: Alert, oriented, no acute distress  HEENT:  Normocephalic and atraumatic, EOMI  Neck:   Supple, no JVD,   Cardiovascular: Regular rate and rhythm,   Pulmonary:  Good air entry bilaterally,    Abdominal:  Soft, non-tender, non-distended  Musculoskeletal: No tenderness, no deformity  Neurological:  grossly intact, no focal deficits  Skin:   Skin is warm and dry. No rash noted.  Vascular exam: Upper extremities warm and adequately perfused, no edema     Lower extremities warm and adequately perfused, no edema           Imagin25  CTA    IMPRESSION:     1.  Areas of decreased corticomedullary differentiation in the upper pole of the left kidney, concerning for pyelonephritis. Please correlate with urinalysis.  2.  A 5.8 cm infrarenal abdominal aortic aneurysm containing a large mural thrombus.  3.  Advanced atherosclerosis, with moderate to severe stenosis of bilateral common iliac arteries. At least moderate stenosis of the visualized left superficial femoral artery.  4.  A 9 mm hypodensity in hepatic segment 4, in close proximity to falciform ligament. It is indeterminate but statistically benign. Consider outpatient follow-up with contrast-enhanced liver protocol MRI  5.  Colonic diverticulosis.  6.  Tiny hiatal hernia.          Assessment and Plan:   - 5.8 cm infrarenal abdominal aortic aneurysm    I discussed the pathophysiology and natural history of aortic aneurysms.  I have recommended endovascular repair of this aortic aneurysm.  I discussed the risk benefits alternatives and expectations and she agrees to proceed.    With respect to her carotid artery disease the patient is due for a carotid artery duplex to surveilled the stent within her carotid artery.  Will obtain carotid duplex    Patient is scheduled for an echocardiogram through cardiology next week we will follow-up on those results         _____________________________________________________  Hank Mukherjee MD  [facility]  [phone]  [address], [place] [address]           [1]   Past Medical History:  Diagnosis Date    Arthritis     Dental disorder 03/16/2023    upper/lowers full plates    High cholesterol 03/16/2023    medicated    Hypertension     pt denies- not taking any medication currently    Migraines, neuralgic     Mitral valve prolapse     Stroke (HCC) 06/04/2009    no residual weaknesses or problems    Stroke (HCC) 03/16/2023    right eye stroke in 2/13/23   [2]   Past Surgical History:  Procedure Laterality Date    CAROTID  "ENDARTERECTOMY  6/6/2009    Performed by ALCIDES MERINO at SURGERY [facility] ORS    GYN SURGERY Bilateral     tubal ligation   [3]   Allergies  Allergen Reactions    Morphine      \"I don't know, it was during my recovery after the endarterectomy-they told me I had a bad reaction to it\"   [4]   Outpatient Encounter Medications as of 7/29/2025   Medication Sig Dispense Refill    rosuvastatin (CRESTOR) 40 MG tablet Take 1 Tablet by mouth every day. 90 Tablet 3    olmesartan (BENICAR) 20 MG Tab Take 1 Tablet by mouth every day. 100 Tablet 3    nicotine (NICODERM CQ) 7 MG/24HR PATCH 24 HR Place 1 Patch on the skin every 24 hours. 30 Patch 6    aspirin (ASA) 81 MG Chew Tab chewable tablet Chew 81 mg every day.      umeclidinium-vilanterol (ANORO ELLIPTA) 62.5-25 MCG/ACT AEROSOL POWDER, BREATH ACTIVATED inhaler Inhale 1 Puff every day. 60 Each 2     No facility-administered encounter medications on file as of 7/29/2025.   [5]   Social History  Tobacco Use   Smoking Status Every Day    Current packs/day: 1.00    Average packs/day: 1 pack/day for 44.9 years (44.9 ttl pk-yrs)    Types: Cigarettes    Start date: 9/16/1980   Smokeless Tobacco Never     "

## 2025-07-31 ENCOUNTER — ANCILLARY PROCEDURE (OUTPATIENT)
Facility: MEDICAL CENTER | Age: 70
End: 2025-07-31
Attending: INTERNAL MEDICINE
Payer: MEDICARE

## 2025-07-31 DIAGNOSIS — I34.1 MITRAL VALVE PROLAPSE: ICD-10-CM

## 2025-07-31 LAB
LV EJECT FRACT  99904: 60
LV EJECT FRACT MOD 2C 99903: 55.27
LV EJECT FRACT MOD 4C 99902: 57.39
LV EJECT FRACT MOD BP 99901: 56.32

## 2025-07-31 PROCEDURE — 93306 TTE W/DOPPLER COMPLETE: CPT

## 2025-07-31 PROCEDURE — 93306 TTE W/DOPPLER COMPLETE: CPT | Mod: 26 | Performed by: INTERNAL MEDICINE

## 2025-08-01 ENCOUNTER — TELEPHONE (OUTPATIENT)
Dept: CARDIOLOGY | Facility: MEDICAL CENTER | Age: 70
End: 2025-08-01
Payer: MEDICARE

## 2025-08-04 ENCOUNTER — HOSPITAL ENCOUNTER (OUTPATIENT)
Facility: MEDICAL CENTER | Age: 70
End: 2025-08-04
Attending: SURGERY | Admitting: SURGERY
Payer: MEDICARE

## 2025-08-06 ASSESSMENT — ENCOUNTER SYMPTOMS
SYNCOPE: 0
LIGHT-HEADEDNESS: 0
NEAR-SYNCOPE: 0
DYSPNEA ON EXERTION: 0
DIZZINESS: 0
PALPITATIONS: 0
SHORTNESS OF BREATH: 0
HEADACHES: 0
ORTHOPNEA: 0
PND: 0

## 2025-08-07 ENCOUNTER — OFFICE VISIT (OUTPATIENT)
Dept: CARDIOLOGY | Facility: MEDICAL CENTER | Age: 70
End: 2025-08-07
Payer: MEDICARE

## 2025-08-07 ENCOUNTER — TELEPHONE (OUTPATIENT)
Dept: CARDIOLOGY | Facility: MEDICAL CENTER | Age: 70
End: 2025-08-07

## 2025-08-07 VITALS
BODY MASS INDEX: 27.66 KG/M2 | HEIGHT: 64 IN | RESPIRATION RATE: 16 BRPM | SYSTOLIC BLOOD PRESSURE: 132 MMHG | DIASTOLIC BLOOD PRESSURE: 70 MMHG | WEIGHT: 162 LBS | OXYGEN SATURATION: 96 % | HEART RATE: 61 BPM

## 2025-08-07 DIAGNOSIS — Z01.810 PRE-PROCEDURAL CARDIOVASCULAR EXAMINATION: ICD-10-CM

## 2025-08-07 DIAGNOSIS — Z72.0 TOBACCO USE: ICD-10-CM

## 2025-08-07 DIAGNOSIS — I63.9 CEREBROVASCULAR ACCIDENT (CVA), UNSPECIFIED MECHANISM (HCC): ICD-10-CM

## 2025-08-07 DIAGNOSIS — Z95.818 STATUS POST PLACEMENT OF IMPLANTABLE LOOP RECORDER: ICD-10-CM

## 2025-08-07 DIAGNOSIS — E78.2 MIXED HYPERLIPIDEMIA: ICD-10-CM

## 2025-08-07 DIAGNOSIS — I35.0 NONRHEUMATIC AORTIC VALVE STENOSIS: Primary | ICD-10-CM

## 2025-08-07 DIAGNOSIS — I34.0 MODERATE MITRAL REGURGITATION: ICD-10-CM

## 2025-08-07 DIAGNOSIS — I35.0 SEVERE AORTIC STENOSIS: ICD-10-CM

## 2025-08-07 DIAGNOSIS — I71.40 ABDOMINAL AORTIC ANEURYSM (AAA) WITHOUT RUPTURE, UNSPECIFIED PART (HCC): ICD-10-CM

## 2025-08-07 DIAGNOSIS — I10 HYPERTENSION, UNSPECIFIED TYPE: ICD-10-CM

## 2025-08-07 PROCEDURE — 99214 OFFICE O/P EST MOD 30 MIN: CPT

## 2025-08-07 PROCEDURE — 3078F DIAST BP <80 MM HG: CPT

## 2025-08-07 PROCEDURE — 3075F SYST BP GE 130 - 139MM HG: CPT

## 2025-08-07 ASSESSMENT — FIBROSIS 4 INDEX: FIB4 SCORE: 2.59

## 2025-08-13 ENCOUNTER — DOCUMENTATION (OUTPATIENT)
Dept: CARDIOLOGY | Facility: MEDICAL CENTER | Age: 70
End: 2025-08-13

## 2025-08-13 ENCOUNTER — TELEPHONE (OUTPATIENT)
Dept: CARDIOLOGY | Facility: MEDICAL CENTER | Age: 70
End: 2025-08-13

## 2025-08-13 ENCOUNTER — OFFICE VISIT (OUTPATIENT)
Facility: MEDICAL CENTER | Age: 70
End: 2025-08-13
Payer: MEDICARE

## 2025-08-13 ENCOUNTER — OFFICE VISIT (OUTPATIENT)
Dept: CARDIOLOGY | Facility: MEDICAL CENTER | Age: 70
End: 2025-08-13
Attending: INTERNAL MEDICINE
Payer: MEDICARE

## 2025-08-13 VITALS
DIASTOLIC BLOOD PRESSURE: 70 MMHG | TEMPERATURE: 98.1 F | WEIGHT: 160 LBS | HEIGHT: 64 IN | BODY MASS INDEX: 27.31 KG/M2 | SYSTOLIC BLOOD PRESSURE: 136 MMHG | HEART RATE: 75 BPM | OXYGEN SATURATION: 98 %

## 2025-08-13 VITALS
WEIGHT: 160 LBS | HEIGHT: 64 IN | RESPIRATION RATE: 16 BRPM | SYSTOLIC BLOOD PRESSURE: 136 MMHG | BODY MASS INDEX: 27.31 KG/M2 | OXYGEN SATURATION: 97 % | DIASTOLIC BLOOD PRESSURE: 70 MMHG | HEART RATE: 60 BPM

## 2025-08-13 DIAGNOSIS — I35.0 SEVERE AORTIC STENOSIS: ICD-10-CM

## 2025-08-13 DIAGNOSIS — I71.40 ABDOMINAL AORTIC ANEURYSM (AAA) WITHOUT RUPTURE, UNSPECIFIED PART (HCC): ICD-10-CM

## 2025-08-13 DIAGNOSIS — I35.0 NONRHEUMATIC AORTIC VALVE STENOSIS: ICD-10-CM

## 2025-08-13 DIAGNOSIS — I10 HYPERTENSION, UNSPECIFIED TYPE: ICD-10-CM

## 2025-08-13 DIAGNOSIS — I35.0 SEVERE AORTIC STENOSIS: Primary | ICD-10-CM

## 2025-08-13 DIAGNOSIS — Z00.6 EXAMINATION OF PARTICIPANT IN CLINICAL TRIAL: Primary | ICD-10-CM

## 2025-08-13 DIAGNOSIS — Z72.0 TOBACCO USE: ICD-10-CM

## 2025-08-13 DIAGNOSIS — Z86.73 H/O: CVA (CEREBROVASCULAR ACCIDENT): ICD-10-CM

## 2025-08-13 DIAGNOSIS — E78.49 OTHER HYPERLIPIDEMIA: ICD-10-CM

## 2025-08-13 DIAGNOSIS — I65.22 CAROTID STENOSIS, LEFT: ICD-10-CM

## 2025-08-13 DIAGNOSIS — I65.23 BILATERAL CAROTID ARTERY STENOSIS: ICD-10-CM

## 2025-08-13 PROBLEM — R10.9 ABDOMINAL PAIN: Status: RESOLVED | Noted: 2025-06-30 | Resolved: 2025-08-13

## 2025-08-13 PROBLEM — I34.1 MITRAL VALVE PROLAPSE: Status: RESOLVED | Noted: 2020-09-16 | Resolved: 2025-08-13

## 2025-08-13 PROBLEM — R11.2 NAUSEA & VOMITING: Status: RESOLVED | Noted: 2025-06-28 | Resolved: 2025-08-13

## 2025-08-13 PROBLEM — J96.01 ACUTE RESPIRATORY FAILURE WITH HYPOXIA (HCC): Status: RESOLVED | Noted: 2023-03-28 | Resolved: 2025-08-13

## 2025-08-13 PROBLEM — R09.02 HYPOXIA: Status: RESOLVED | Noted: 2023-03-28 | Resolved: 2025-08-13

## 2025-08-13 PROBLEM — I63.511 CEREBROVASCULAR ACCIDENT (CVA) DUE TO OCCLUSION OF RIGHT MIDDLE CEREBRAL ARTERY (HCC): Status: RESOLVED | Noted: 2023-03-27 | Resolved: 2025-08-13

## 2025-08-13 PROBLEM — R79.89 ELEVATED TROPONIN: Status: RESOLVED | Noted: 2025-06-28 | Resolved: 2025-08-13

## 2025-08-13 PROBLEM — D72.829 LEUKOCYTOSIS: Status: RESOLVED | Noted: 2025-06-28 | Resolved: 2025-08-13

## 2025-08-13 PROCEDURE — 99205 OFFICE O/P NEW HI 60 MIN: CPT | Performed by: INTERNAL MEDICINE

## 2025-08-13 PROCEDURE — 3075F SYST BP GE 130 - 139MM HG: CPT | Performed by: INTERNAL MEDICINE

## 2025-08-13 PROCEDURE — 3078F DIAST BP <80 MM HG: CPT | Performed by: INTERNAL MEDICINE

## 2025-08-13 PROCEDURE — 99205 OFFICE O/P NEW HI 60 MIN: CPT | Performed by: THORACIC SURGERY (CARDIOTHORACIC VASCULAR SURGERY)

## 2025-08-13 PROCEDURE — 3075F SYST BP GE 130 - 139MM HG: CPT | Performed by: THORACIC SURGERY (CARDIOTHORACIC VASCULAR SURGERY)

## 2025-08-13 PROCEDURE — 3078F DIAST BP <80 MM HG: CPT | Performed by: THORACIC SURGERY (CARDIOTHORACIC VASCULAR SURGERY)

## 2025-08-13 PROCEDURE — 99213 OFFICE O/P EST LOW 20 MIN: CPT | Performed by: INTERNAL MEDICINE

## 2025-08-13 ASSESSMENT — ENCOUNTER SYMPTOMS
BLURRED VISION: 0
SORE THROAT: 0
EYE DISCHARGE: 0
SPUTUM PRODUCTION: 0
WHEEZING: 0
SEIZURES: 0
FEVER: 0
CONSTIPATION: 0
CHILLS: 0
STRIDOR: 0
HEMOPTYSIS: 0
HEARTBURN: 0
DIAPHORESIS: 0
EYE PAIN: 0
WEAKNESS: 0
NAUSEA: 0
FOCAL WEAKNESS: 0
MYALGIAS: 0
FLANK PAIN: 0
NECK PAIN: 0
POLYDIPSIA: 0
HEADACHES: 0
COUGH: 0
WEIGHT LOSS: 0
SHORTNESS OF BREATH: 1
HALLUCINATIONS: 0
VOMITING: 0
SPEECH CHANGE: 0
BRUISES/BLEEDS EASILY: 0
DEPRESSION: 0
DIZZINESS: 0
DOUBLE VISION: 0
ORTHOPNEA: 0
PALPITATIONS: 0
ABDOMINAL PAIN: 0

## 2025-08-13 ASSESSMENT — PATIENT HEALTH QUESTIONNAIRE - PHQ9
5. POOR APPETITE OR OVEREATING: 2 - MORE THAN HALF THE DAYS
CLINICAL INTERPRETATION OF PHQ2 SCORE: 5
SUM OF ALL RESPONSES TO PHQ QUESTIONS 1-9: 10

## 2025-08-13 ASSESSMENT — LIFESTYLE VARIABLES: SUBSTANCE_ABUSE: 0

## 2025-08-13 ASSESSMENT — FIBROSIS 4 INDEX
FIB4 SCORE: 2.59
FIB4 SCORE: 2.59

## 2025-08-14 ENCOUNTER — APPOINTMENT (OUTPATIENT)
Dept: ADMISSIONS | Facility: MEDICAL CENTER | Age: 70
End: 2025-08-14
Attending: INTERNAL MEDICINE
Payer: MEDICARE

## 2025-08-14 ENCOUNTER — HOSPITAL ENCOUNTER (OUTPATIENT)
Dept: RADIOLOGY | Facility: MEDICAL CENTER | Age: 70
End: 2025-08-14
Attending: NURSE PRACTITIONER
Payer: MEDICARE

## 2025-08-14 DIAGNOSIS — Z01.810 PRE-PROCEDURAL CARDIOVASCULAR EXAMINATION: ICD-10-CM

## 2025-08-14 DIAGNOSIS — I35.0 SEVERE AORTIC STENOSIS: ICD-10-CM

## 2025-08-14 PROCEDURE — 76497 UNLISTED CT PROCEDURE: CPT

## 2025-08-14 PROCEDURE — 700117 HCHG RX CONTRAST REV CODE 255: Performed by: NURSE PRACTITIONER

## 2025-08-14 RX ADMIN — IOHEXOL 100 ML: 350 INJECTION, SOLUTION INTRAVENOUS at 09:15

## 2025-08-15 ENCOUNTER — PRE-ADMISSION TESTING (OUTPATIENT)
Dept: ADMISSIONS | Facility: MEDICAL CENTER | Age: 70
End: 2025-08-15
Attending: INTERNAL MEDICINE
Payer: MEDICARE

## 2025-08-15 ASSESSMENT — LIFESTYLE VARIABLES: HOW OFTEN DO YOU HAVE A DRINK CONTAINING ALCOHOL: NEVER

## 2025-08-16 ENCOUNTER — DOCUMENTATION (OUTPATIENT)
Dept: CARDIOLOGY | Facility: MEDICAL CENTER | Age: 70
End: 2025-08-16
Payer: MEDICARE

## 2025-08-18 ENCOUNTER — APPOINTMENT (OUTPATIENT)
Dept: CARDIOLOGY | Facility: MEDICAL CENTER | Age: 70
End: 2025-08-18
Attending: INTERNAL MEDICINE
Payer: MEDICARE

## 2025-08-18 ENCOUNTER — HOSPITAL ENCOUNTER (OUTPATIENT)
Facility: MEDICAL CENTER | Age: 70
End: 2025-08-19
Attending: INTERNAL MEDICINE | Admitting: INTERNAL MEDICINE
Payer: MEDICARE

## 2025-08-18 DIAGNOSIS — I35.0 SEVERE AORTIC STENOSIS: ICD-10-CM

## 2025-08-18 DIAGNOSIS — Z01.810 PRE-PROCEDURAL CARDIOVASCULAR EXAMINATION: ICD-10-CM

## 2025-08-18 DIAGNOSIS — Z95.5 S/P DRUG ELUTING CORONARY STENT PLACEMENT: Primary | ICD-10-CM

## 2025-08-18 PROBLEM — I25.10 CAD IN NATIVE ARTERY: Status: ACTIVE | Noted: 2025-08-18

## 2025-08-18 PROBLEM — I70.202 FEMORAL ARTERY OCCLUSION, LEFT (HCC): Status: ACTIVE | Noted: 2025-08-18

## 2025-08-18 PROBLEM — I51.7 CARDIOMEGALY: Status: ACTIVE | Noted: 2025-08-18

## 2025-08-18 PROBLEM — I74.5 ILIAC ARTERY OCCLUSION, RIGHT (HCC): Status: ACTIVE | Noted: 2025-08-18

## 2025-08-18 PROBLEM — R91.1 LESION OF LEFT LUNG: Status: ACTIVE | Noted: 2025-08-18

## 2025-08-18 LAB
ACT BLD: 216 S (ref 74–137)
ACT BLD: 279 S (ref 74–137)
ALBUMIN SERPL BCP-MCNC: 3.8 G/DL (ref 3.2–4.9)
ALBUMIN/GLOB SERPL: 1.4 G/DL
ALP SERPL-CCNC: 63 U/L (ref 30–99)
ALT SERPL-CCNC: 22 U/L (ref 2–50)
ANION GAP SERPL CALC-SCNC: 11 MMOL/L (ref 7–16)
APTT PPP: 28.7 SEC (ref 24.7–36)
AST SERPL-CCNC: 30 U/L (ref 12–45)
BASOPHILS # BLD AUTO: 0.5 % (ref 0–1.8)
BASOPHILS # BLD: 0.02 K/UL (ref 0–0.12)
BILIRUB SERPL-MCNC: 0.3 MG/DL (ref 0.1–1.5)
BUN SERPL-MCNC: 17 MG/DL (ref 8–22)
CALCIUM ALBUM COR SERPL-MCNC: 9 MG/DL (ref 8.5–10.5)
CALCIUM SERPL-MCNC: 8.8 MG/DL (ref 8.5–10.5)
CHLORIDE SERPL-SCNC: 112 MMOL/L (ref 96–112)
CO2 SERPL-SCNC: 19 MMOL/L (ref 20–33)
CREAT SERPL-MCNC: 0.62 MG/DL (ref 0.5–1.4)
EKG IMPRESSION: NORMAL
EOSINOPHIL # BLD AUTO: 0.06 K/UL (ref 0–0.51)
EOSINOPHIL NFR BLD: 1.6 % (ref 0–6.9)
ERYTHROCYTE [DISTWIDTH] IN BLOOD BY AUTOMATED COUNT: 52.7 FL (ref 35.9–50)
GFR SERPLBLD CREATININE-BSD FMLA CKD-EPI: 96 ML/MIN/1.73 M 2
GLOBULIN SER CALC-MCNC: 2.8 G/DL (ref 1.9–3.5)
GLUCOSE SERPL-MCNC: 109 MG/DL (ref 65–99)
HCT VFR BLD AUTO: 37.3 % (ref 37–47)
HGB BLD-MCNC: 12.2 G/DL (ref 12–16)
IMM GRANULOCYTES # BLD AUTO: 0.01 K/UL (ref 0–0.11)
IMM GRANULOCYTES NFR BLD AUTO: 0.3 % (ref 0–0.9)
INR PPP: 1.01 (ref 0.87–1.13)
LYMPHOCYTES # BLD AUTO: 1.16 K/UL (ref 1–4.8)
LYMPHOCYTES NFR BLD: 30.9 % (ref 22–41)
MCH RBC QN AUTO: 29.3 PG (ref 27–33)
MCHC RBC AUTO-ENTMCNC: 32.7 G/DL (ref 32.2–35.5)
MCV RBC AUTO: 89.7 FL (ref 81.4–97.8)
MONOCYTES # BLD AUTO: 0.39 K/UL (ref 0–0.85)
MONOCYTES NFR BLD AUTO: 10.4 % (ref 0–13.4)
NEUTROPHILS # BLD AUTO: 2.11 K/UL (ref 1.82–7.42)
NEUTROPHILS NFR BLD: 56.3 % (ref 44–72)
NRBC # BLD AUTO: 0 K/UL
NRBC BLD-RTO: 0 /100 WBC (ref 0–0.2)
NT-PROBNP SERPL IA-MCNC: 3029 PG/ML (ref 0–125)
PLATELET # BLD AUTO: 188 K/UL (ref 164–446)
PMV BLD AUTO: 9.6 FL (ref 9–12.9)
POTASSIUM SERPL-SCNC: 3.7 MMOL/L (ref 3.6–5.5)
PROT SERPL-MCNC: 6.6 G/DL (ref 6–8.2)
PROTHROMBIN TIME: 13.4 SEC (ref 12–14.6)
RBC # BLD AUTO: 4.16 M/UL (ref 4.2–5.4)
SODIUM SERPL-SCNC: 142 MMOL/L (ref 135–145)
WBC # BLD AUTO: 3.8 K/UL (ref 4.8–10.8)

## 2025-08-18 PROCEDURE — 700105 HCHG RX REV CODE 258

## 2025-08-18 PROCEDURE — 93010 ELECTROCARDIOGRAM REPORT: CPT | Performed by: INTERNAL MEDICINE

## 2025-08-18 PROCEDURE — 83880 ASSAY OF NATRIURETIC PEPTIDE: CPT

## 2025-08-18 PROCEDURE — 160193 HCHG PACU STANDARD - 1ST 60 MINS

## 2025-08-18 PROCEDURE — A9270 NON-COVERED ITEM OR SERVICE: HCPCS

## 2025-08-18 PROCEDURE — C1887 CATHETER, GUIDING: HCPCS

## 2025-08-18 PROCEDURE — 92920 PRQ TRLUML C ANGIOP 1ART&/BR: CPT | Mod: 59,RI | Performed by: INTERNAL MEDICINE

## 2025-08-18 PROCEDURE — 160002 HCHG RECOVERY MINUTES (STAT)

## 2025-08-18 PROCEDURE — 700117 HCHG RX CONTRAST REV CODE 255: Performed by: INTERNAL MEDICINE

## 2025-08-18 PROCEDURE — 93005 ELECTROCARDIOGRAM TRACING: CPT | Mod: TC | Performed by: INTERNAL MEDICINE

## 2025-08-18 PROCEDURE — 85730 THROMBOPLASTIN TIME PARTIAL: CPT

## 2025-08-18 PROCEDURE — 700101 HCHG RX REV CODE 250

## 2025-08-18 PROCEDURE — 700102 HCHG RX REV CODE 250 W/ 637 OVERRIDE(OP)

## 2025-08-18 PROCEDURE — 93454 CORONARY ARTERY ANGIO S&I: CPT | Mod: 26,59 | Performed by: INTERNAL MEDICINE

## 2025-08-18 PROCEDURE — 85025 COMPLETE CBC W/AUTO DIFF WBC: CPT

## 2025-08-18 PROCEDURE — 85610 PROTHROMBIN TIME: CPT

## 2025-08-18 PROCEDURE — 85347 COAGULATION TIME ACTIVATED: CPT | Performed by: INTERNAL MEDICINE

## 2025-08-18 PROCEDURE — 92928 PRQ TCAT PLMT NTRAC ST 1 LES: CPT | Mod: LD | Performed by: INTERNAL MEDICINE

## 2025-08-18 PROCEDURE — 700111 HCHG RX REV CODE 636 W/ 250 OVERRIDE (IP)

## 2025-08-18 PROCEDURE — 99152 MOD SED SAME PHYS/QHP 5/>YRS: CPT | Performed by: INTERNAL MEDICINE

## 2025-08-18 PROCEDURE — G0378 HOSPITAL OBSERVATION PER HR: HCPCS

## 2025-08-18 PROCEDURE — 80053 COMPREHEN METABOLIC PANEL: CPT

## 2025-08-18 RX ORDER — OLMESARTAN MEDOXOMIL 20 MG/1
20 TABLET ORAL DAILY
Status: DISCONTINUED | OUTPATIENT
Start: 2025-08-18 | End: 2025-08-19 | Stop reason: HOSPADM

## 2025-08-18 RX ORDER — CLOPIDOGREL 300 MG/1
600 TABLET, FILM COATED ORAL ONCE
Status: DISCONTINUED | OUTPATIENT
Start: 2025-08-18 | End: 2025-08-18

## 2025-08-18 RX ORDER — ROSUVASTATIN CALCIUM 20 MG/1
40 TABLET, COATED ORAL DAILY
Status: DISCONTINUED | OUTPATIENT
Start: 2025-08-18 | End: 2025-08-19 | Stop reason: HOSPADM

## 2025-08-18 RX ORDER — ASPIRIN 81 MG/1
TABLET, CHEWABLE ORAL
Status: COMPLETED
Start: 2025-08-18 | End: 2025-08-18

## 2025-08-18 RX ORDER — HEPARIN SODIUM 1000 [USP'U]/ML
INJECTION, SOLUTION INTRAVENOUS; SUBCUTANEOUS
Status: COMPLETED
Start: 2025-08-18 | End: 2025-08-18

## 2025-08-18 RX ORDER — CLOPIDOGREL BISULFATE 75 MG/1
75 TABLET ORAL DAILY
Status: DISCONTINUED | OUTPATIENT
Start: 2025-08-19 | End: 2025-08-19 | Stop reason: HOSPADM

## 2025-08-18 RX ORDER — ASPIRIN 81 MG/1
81 TABLET, CHEWABLE ORAL DAILY
Status: DISCONTINUED | OUTPATIENT
Start: 2025-08-19 | End: 2025-08-19 | Stop reason: HOSPADM

## 2025-08-18 RX ORDER — VERAPAMIL HYDROCHLORIDE 2.5 MG/ML
INJECTION INTRAVENOUS
Status: COMPLETED
Start: 2025-08-18 | End: 2025-08-18

## 2025-08-18 RX ORDER — CLOPIDOGREL 300 MG/1
TABLET, FILM COATED ORAL
Status: COMPLETED
Start: 2025-08-18 | End: 2025-08-18

## 2025-08-18 RX ORDER — MIDAZOLAM HYDROCHLORIDE 1 MG/ML
INJECTION INTRAMUSCULAR; INTRAVENOUS
Status: COMPLETED
Start: 2025-08-18 | End: 2025-08-18

## 2025-08-18 RX ORDER — LIDOCAINE HYDROCHLORIDE 20 MG/ML
INJECTION, SOLUTION INFILTRATION; PERINEURAL
Status: COMPLETED
Start: 2025-08-18 | End: 2025-08-18

## 2025-08-18 RX ORDER — HEPARIN SODIUM 200 [USP'U]/100ML
INJECTION, SOLUTION INTRAVENOUS
Status: COMPLETED
Start: 2025-08-18 | End: 2025-08-18

## 2025-08-18 RX ADMIN — HEPARIN SODIUM 2000 UNITS: 200 INJECTION, SOLUTION INTRAVENOUS at 08:07

## 2025-08-18 RX ADMIN — FENTANYL CITRATE 100 MCG: 50 INJECTION, SOLUTION INTRAMUSCULAR; INTRAVENOUS at 08:19

## 2025-08-18 RX ADMIN — MIDAZOLAM HYDROCHLORIDE 2 MG: 1 INJECTION, SOLUTION INTRAMUSCULAR; INTRAVENOUS at 08:53

## 2025-08-18 RX ADMIN — HEPARIN SODIUM: 1000 INJECTION, SOLUTION INTRAVENOUS; SUBCUTANEOUS at 09:12

## 2025-08-18 RX ADMIN — LIDOCAINE HYDROCHLORIDE: 20 INJECTION, SOLUTION INFILTRATION; PERINEURAL at 08:07

## 2025-08-18 RX ADMIN — HEPARIN SODIUM: 1000 INJECTION, SOLUTION INTRAVENOUS; SUBCUTANEOUS at 08:07

## 2025-08-18 RX ADMIN — NITROGLYCERIN 10 ML: 5 INJECTION, SOLUTION INTRAVENOUS at 08:07

## 2025-08-18 RX ADMIN — CLOPIDOGREL BISULFATE 600 MG: 300 TABLET, FILM COATED ORAL at 09:26

## 2025-08-18 RX ADMIN — FENTANYL CITRATE 100 MCG: 50 INJECTION, SOLUTION INTRAMUSCULAR; INTRAVENOUS at 09:17

## 2025-08-18 RX ADMIN — IOHEXOL 110 ML: 350 INJECTION, SOLUTION INTRAVENOUS at 09:23

## 2025-08-18 RX ADMIN — VERAPAMIL HYDROCHLORIDE 5 MG: 2.5 INJECTION, SOLUTION INTRAVENOUS at 08:07

## 2025-08-18 RX ADMIN — MIDAZOLAM HYDROCHLORIDE 2 MG: 1 INJECTION, SOLUTION INTRAMUSCULAR; INTRAVENOUS at 08:19

## 2025-08-18 RX ADMIN — ASPIRIN 81 MG: 81 TABLET, CHEWABLE ORAL at 08:02

## 2025-08-18 ASSESSMENT — FIBROSIS 4 INDEX: FIB4 SCORE: 2.59

## 2025-08-19 ENCOUNTER — HOSPITAL ENCOUNTER (OUTPATIENT)
Dept: RADIOLOGY | Facility: MEDICAL CENTER | Age: 70
End: 2025-08-19
Attending: SURGERY
Payer: MEDICARE

## 2025-08-19 VITALS
HEIGHT: 64 IN | BODY MASS INDEX: 27.17 KG/M2 | WEIGHT: 159.17 LBS | HEART RATE: 74 BPM | SYSTOLIC BLOOD PRESSURE: 151 MMHG | TEMPERATURE: 98.2 F | DIASTOLIC BLOOD PRESSURE: 76 MMHG | OXYGEN SATURATION: 90 % | RESPIRATION RATE: 18 BRPM

## 2025-08-19 LAB
ANION GAP SERPL CALC-SCNC: 11 MMOL/L (ref 7–16)
BUN SERPL-MCNC: 11 MG/DL (ref 8–22)
CALCIUM SERPL-MCNC: 8.8 MG/DL (ref 8.5–10.5)
CHLORIDE SERPL-SCNC: 108 MMOL/L (ref 96–112)
CO2 SERPL-SCNC: 20 MMOL/L (ref 20–33)
CREAT SERPL-MCNC: 0.7 MG/DL (ref 0.5–1.4)
EKG IMPRESSION: NORMAL
ERYTHROCYTE [DISTWIDTH] IN BLOOD BY AUTOMATED COUNT: 51 FL (ref 35.9–50)
GFR SERPLBLD CREATININE-BSD FMLA CKD-EPI: 93 ML/MIN/1.73 M 2
GLUCOSE SERPL-MCNC: 114 MG/DL (ref 65–99)
HCT VFR BLD AUTO: 39.3 % (ref 37–47)
HGB BLD-MCNC: 12.9 G/DL (ref 12–16)
MCH RBC QN AUTO: 28.7 PG (ref 27–33)
MCHC RBC AUTO-ENTMCNC: 32.8 G/DL (ref 32.2–35.5)
MCV RBC AUTO: 87.5 FL (ref 81.4–97.8)
PLATELET # BLD AUTO: 200 K/UL (ref 164–446)
PMV BLD AUTO: 9.8 FL (ref 9–12.9)
POTASSIUM SERPL-SCNC: 3.7 MMOL/L (ref 3.6–5.5)
RBC # BLD AUTO: 4.49 M/UL (ref 4.2–5.4)
SODIUM SERPL-SCNC: 139 MMOL/L (ref 135–145)
WBC # BLD AUTO: 5.7 K/UL (ref 4.8–10.8)

## 2025-08-19 PROCEDURE — G0378 HOSPITAL OBSERVATION PER HR: HCPCS

## 2025-08-19 PROCEDURE — 80048 BASIC METABOLIC PNL TOTAL CA: CPT

## 2025-08-19 PROCEDURE — 700102 HCHG RX REV CODE 250 W/ 637 OVERRIDE(OP): Performed by: INTERNAL MEDICINE

## 2025-08-19 PROCEDURE — A9270 NON-COVERED ITEM OR SERVICE: HCPCS | Performed by: INTERNAL MEDICINE

## 2025-08-19 PROCEDURE — 85027 COMPLETE CBC AUTOMATED: CPT

## 2025-08-19 PROCEDURE — 93010 ELECTROCARDIOGRAM REPORT: CPT | Performed by: INTERNAL MEDICINE

## 2025-08-19 RX ORDER — CLOPIDOGREL BISULFATE 75 MG/1
75 TABLET ORAL DAILY
Qty: 90 TABLET | Refills: 3 | Status: SHIPPED | OUTPATIENT
Start: 2025-08-20

## 2025-08-19 RX ADMIN — ROSUVASTATIN CALCIUM 40 MG: 20 TABLET, FILM COATED ORAL at 05:22

## 2025-08-19 RX ADMIN — OLMESARTAN MEDOXOMIL 20 MG: 20 TABLET, FILM COATED ORAL at 05:22

## 2025-08-19 RX ADMIN — CLOPIDOGREL BISULFATE 75 MG: 75 TABLET, FILM COATED ORAL at 05:21

## 2025-08-19 RX ADMIN — ASPIRIN 81 MG: 81 TABLET, CHEWABLE ORAL at 05:21

## 2025-08-19 RX ADMIN — NICOTINE 7 MG: 7 PATCH TRANSDERMAL at 05:23

## 2025-08-19 ASSESSMENT — COGNITIVE AND FUNCTIONAL STATUS - GENERAL
SUGGESTED CMS G CODE MODIFIER MOBILITY: CH
DAILY ACTIVITIY SCORE: 24
MOBILITY SCORE: 24
SUGGESTED CMS G CODE MODIFIER DAILY ACTIVITY: CH

## 2025-08-19 ASSESSMENT — LIFESTYLE VARIABLES
EVER FELT BAD OR GUILTY ABOUT YOUR DRINKING: NO
TOTAL SCORE: 0
EVER HAD A DRINK FIRST THING IN THE MORNING TO STEADY YOUR NERVES TO GET RID OF A HANGOVER: NO
HAVE PEOPLE ANNOYED YOU BY CRITICIZING YOUR DRINKING: NO
AVERAGE NUMBER OF DAYS PER WEEK YOU HAVE A DRINK CONTAINING ALCOHOL: 0
HAVE YOU EVER FELT YOU SHOULD CUT DOWN ON YOUR DRINKING: NO
ON A TYPICAL DAY WHEN YOU DRINK ALCOHOL HOW MANY DRINKS DO YOU HAVE: 0
HOW MANY TIMES IN THE PAST YEAR HAVE YOU HAD 5 OR MORE DRINKS IN A DAY: 0
DOES PATIENT WANT TO STOP DRINKING: NO
TOTAL SCORE: 0
ALCOHOL_USE: NO
TOTAL SCORE: 0
CONSUMPTION TOTAL: NEGATIVE

## 2025-08-19 ASSESSMENT — PATIENT HEALTH QUESTIONNAIRE - PHQ9
2. FEELING DOWN, DEPRESSED, IRRITABLE, OR HOPELESS: NOT AT ALL
SUM OF ALL RESPONSES TO PHQ9 QUESTIONS 1 AND 2: 0
1. LITTLE INTEREST OR PLEASURE IN DOING THINGS: NOT AT ALL

## 2025-08-20 ENCOUNTER — DOCUMENTATION (OUTPATIENT)
Dept: CARDIOLOGY | Facility: MEDICAL CENTER | Age: 70
End: 2025-08-20
Payer: MEDICARE

## 2025-08-20 ENCOUNTER — OFFICE VISIT (OUTPATIENT)
Dept: URGENT CARE | Facility: CLINIC | Age: 70
End: 2025-08-20
Payer: MEDICARE

## 2025-08-20 ENCOUNTER — TELEPHONE (OUTPATIENT)
Dept: CARDIOLOGY | Facility: MEDICAL CENTER | Age: 70
End: 2025-08-20
Payer: MEDICARE

## 2025-08-20 VITALS
TEMPERATURE: 97.9 F | OXYGEN SATURATION: 98 % | DIASTOLIC BLOOD PRESSURE: 68 MMHG | RESPIRATION RATE: 12 BRPM | SYSTOLIC BLOOD PRESSURE: 140 MMHG | HEART RATE: 77 BPM | BODY MASS INDEX: 27.29 KG/M2 | WEIGHT: 159 LBS
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
## 2025-08-20 DIAGNOSIS — I35.0 NONRHEUMATIC AORTIC VALVE STENOSIS: Primary | ICD-10-CM

## 2025-08-20 PROCEDURE — 99203 OFFICE O/P NEW LOW 30 MIN: CPT | Mod: 25

## 2025-08-20 PROCEDURE — 3078F DIAST BP <80 MM HG: CPT

## 2025-08-20 PROCEDURE — 3077F SYST BP >= 140 MM HG: CPT

## 2025-08-20 PROCEDURE — 90471 IMMUNIZATION ADMIN: CPT

## 2025-08-20 PROCEDURE — 90714 TD VACC NO PRESV 7 YRS+ IM: CPT | Mod: JZ

## 2025-08-20 ASSESSMENT — FIBROSIS 4 INDEX: FIB4 SCORE: 2.21

## 2025-08-20 ASSESSMENT — ENCOUNTER SYMPTOMS
FEVER: 0
CHILLS: 0

## 2025-08-20 ASSESSMENT — LIFESTYLE VARIABLES: HOW MANY STANDARD DRINKS CONTAINING ALCOHOL DO YOU HAVE ON A TYPICAL DAY: PATIENT DOES NOT DRINK

## 2025-08-25 ENCOUNTER — APPOINTMENT (OUTPATIENT)
Dept: CARDIOLOGY | Facility: MEDICAL CENTER | Age: 70
DRG: 266 | End: 2025-08-25
Attending: STUDENT IN AN ORGANIZED HEALTH CARE EDUCATION/TRAINING PROGRAM
Payer: MEDICARE

## 2025-08-25 ENCOUNTER — ANESTHESIA (OUTPATIENT)
Dept: SURGERY | Facility: MEDICAL CENTER | Age: 70
DRG: 266 | End: 2025-08-25
Payer: MEDICARE

## 2025-08-25 ENCOUNTER — APPOINTMENT (OUTPATIENT)
Dept: RADIOLOGY | Facility: MEDICAL CENTER | Age: 70
DRG: 266 | End: 2025-08-25
Attending: NURSE PRACTITIONER
Payer: MEDICARE

## 2025-08-25 ENCOUNTER — HOSPITAL ENCOUNTER (INPATIENT)
Facility: MEDICAL CENTER | Age: 70
LOS: 1 days | DRG: 266 | End: 2025-08-26
Attending: INTERNAL MEDICINE | Admitting: INTERNAL MEDICINE
Payer: MEDICARE

## 2025-08-25 ENCOUNTER — ANESTHESIA EVENT (OUTPATIENT)
Dept: SURGERY | Facility: MEDICAL CENTER | Age: 70
DRG: 266 | End: 2025-08-25
Payer: MEDICARE

## 2025-08-25 DIAGNOSIS — Z95.2 S/P TAVR (TRANSCATHETER AORTIC VALVE REPLACEMENT): Primary | ICD-10-CM

## 2025-08-25 PROBLEM — I44.7 LEFT BUNDLE BRANCH BLOCK: Status: ACTIVE | Noted: 2025-08-25

## 2025-08-25 PROBLEM — I50.33 ACUTE ON CHRONIC DIASTOLIC HEART FAILURE DUE TO VALVULAR DISEASE (HCC): Status: ACTIVE | Noted: 2025-08-25

## 2025-08-25 PROBLEM — E87.6 HYPOKALEMIA: Status: RESOLVED | Noted: 2025-06-28 | Resolved: 2025-08-25

## 2025-08-25 PROBLEM — I51.7 CARDIOMEGALY: Status: RESOLVED | Noted: 2025-08-18 | Resolved: 2025-08-25

## 2025-08-25 PROBLEM — I65.22 CAROTID STENOSIS, LEFT: Status: RESOLVED | Noted: 2023-03-27 | Resolved: 2025-08-25

## 2025-08-25 PROBLEM — I38 ACUTE ON CHRONIC DIASTOLIC HEART FAILURE DUE TO VALVULAR DISEASE (HCC): Status: ACTIVE | Noted: 2025-08-25

## 2025-08-25 PROBLEM — E87.1 HYPONATREMIA: Status: RESOLVED | Noted: 2025-06-28 | Resolved: 2025-08-25

## 2025-08-25 LAB
ACT BLD: 279 S (ref 74–137)
ACT BLD: 331 S (ref 74–137)
ALBUMIN SERPL BCP-MCNC: 3.4 G/DL (ref 3.2–4.9)
ALBUMIN/GLOB SERPL: 1.5 G/DL
ALP SERPL-CCNC: 56 U/L (ref 30–99)
ALT SERPL-CCNC: 14 U/L (ref 2–50)
ANION GAP SERPL CALC-SCNC: 9 MMOL/L (ref 7–16)
AST SERPL-CCNC: 18 U/L (ref 12–45)
BILIRUB SERPL-MCNC: 0.3 MG/DL (ref 0.1–1.5)
BUN SERPL-MCNC: 18 MG/DL (ref 8–22)
CALCIUM ALBUM COR SERPL-MCNC: 8.8 MG/DL (ref 8.5–10.5)
CALCIUM SERPL-MCNC: 8.3 MG/DL (ref 8.5–10.5)
CHLORIDE SERPL-SCNC: 109 MMOL/L (ref 96–112)
CO2 SERPL-SCNC: 19 MMOL/L (ref 20–33)
CREAT SERPL-MCNC: 0.62 MG/DL (ref 0.5–1.4)
EKG IMPRESSION: NORMAL
ERYTHROCYTE [DISTWIDTH] IN BLOOD BY AUTOMATED COUNT: 53.9 FL (ref 35.9–50)
GFR SERPLBLD CREATININE-BSD FMLA CKD-EPI: 96 ML/MIN/1.73 M 2
GLOBULIN SER CALC-MCNC: 2.3 G/DL (ref 1.9–3.5)
GLUCOSE SERPL-MCNC: 159 MG/DL (ref 65–99)
HCT VFR BLD AUTO: 35.9 % (ref 37–47)
HGB BLD-MCNC: 11.4 G/DL (ref 12–16)
LV EJECT FRACT  99904: 60
MCH RBC QN AUTO: 28.9 PG (ref 27–33)
MCHC RBC AUTO-ENTMCNC: 31.8 G/DL (ref 32.2–35.5)
MCV RBC AUTO: 90.9 FL (ref 81.4–97.8)
NT-PROBNP SERPL IA-MCNC: 2413 PG/ML (ref 0–125)
PLATELET # BLD AUTO: 193 K/UL (ref 164–446)
PMV BLD AUTO: 9.5 FL (ref 9–12.9)
POTASSIUM SERPL-SCNC: 4.2 MMOL/L (ref 3.6–5.5)
PROT SERPL-MCNC: 5.7 G/DL (ref 6–8.2)
RBC # BLD AUTO: 3.95 M/UL (ref 4.2–5.4)
SODIUM SERPL-SCNC: 137 MMOL/L (ref 135–145)
WBC # BLD AUTO: 7.3 K/UL (ref 4.8–10.8)

## 2025-08-25 PROCEDURE — 85027 COMPLETE CBC AUTOMATED: CPT

## 2025-08-25 PROCEDURE — 160042 HCHG SURGERY MINUTES - EA ADDL 1 MIN LEVEL 5: Performed by: INTERNAL MEDICINE

## 2025-08-25 PROCEDURE — 700101 HCHG RX REV CODE 250: Performed by: STUDENT IN AN ORGANIZED HEALTH CARE EDUCATION/TRAINING PROGRAM

## 2025-08-25 PROCEDURE — 33362 REPLACE AORTIC VALVE OPEN: CPT | Mod: 62,Q0 | Performed by: INTERNAL MEDICINE

## 2025-08-25 PROCEDURE — 700101 HCHG RX REV CODE 250: Performed by: SURGERY

## 2025-08-25 PROCEDURE — 71045 X-RAY EXAM CHEST 1 VIEW: CPT

## 2025-08-25 PROCEDURE — 700111 HCHG RX REV CODE 636 W/ 250 OVERRIDE (IP): Performed by: SURGERY

## 2025-08-25 PROCEDURE — 700102 HCHG RX REV CODE 250 W/ 637 OVERRIDE(OP): Performed by: NURSE PRACTITIONER

## 2025-08-25 PROCEDURE — C1781 MESH (IMPLANTABLE): HCPCS | Performed by: INTERNAL MEDICINE

## 2025-08-25 PROCEDURE — 36415 COLL VENOUS BLD VENIPUNCTURE: CPT

## 2025-08-25 PROCEDURE — 110372 HCHG SHELL REV 278: Performed by: INTERNAL MEDICINE

## 2025-08-25 PROCEDURE — C1751 CATH, INF, PER/CENT/MIDLINE: HCPCS | Performed by: INTERNAL MEDICINE

## 2025-08-25 PROCEDURE — C1883 ADAPT/EXT, PACING/NEURO LEAD: HCPCS | Performed by: INTERNAL MEDICINE

## 2025-08-25 PROCEDURE — 160195 HCHG PACU COMPLEX - 1ST 60 MINS: Performed by: INTERNAL MEDICINE

## 2025-08-25 PROCEDURE — 33362 REPLACE AORTIC VALVE OPEN: CPT | Mod: 62,Q0 | Performed by: THORACIC SURGERY (CARDIOTHORACIC VASCULAR SURGERY)

## 2025-08-25 PROCEDURE — 700105 HCHG RX REV CODE 258: Performed by: STUDENT IN AN ORGANIZED HEALTH CARE EDUCATION/TRAINING PROGRAM

## 2025-08-25 PROCEDURE — C1887 CATHETER, GUIDING: HCPCS | Performed by: INTERNAL MEDICINE

## 2025-08-25 PROCEDURE — 85347 COAGULATION TIME ACTIVATED: CPT | Performed by: INTERNAL MEDICINE

## 2025-08-25 PROCEDURE — A9270 NON-COVERED ITEM OR SERVICE: HCPCS | Performed by: NURSE PRACTITIONER

## 2025-08-25 PROCEDURE — 700105 HCHG RX REV CODE 258: Performed by: NURSE PRACTITIONER

## 2025-08-25 PROCEDURE — B24BZZ4 ULTRASONOGRAPHY OF HEART WITH AORTA, TRANSESOPHAGEAL: ICD-10-PCS | Performed by: THORACIC SURGERY (CARDIOTHORACIC VASCULAR SURGERY)

## 2025-08-25 PROCEDURE — 700111 HCHG RX REV CODE 636 W/ 250 OVERRIDE (IP): Mod: JZ | Performed by: NURSE PRACTITIONER

## 2025-08-25 PROCEDURE — 02RF38Z REPLACEMENT OF AORTIC VALVE WITH ZOOPLASTIC TISSUE, PERCUTANEOUS APPROACH: ICD-10-PCS | Performed by: THORACIC SURGERY (CARDIOTHORACIC VASCULAR SURGERY)

## 2025-08-25 PROCEDURE — 93355 ECHO TRANSESOPHAGEAL (TEE): CPT

## 2025-08-25 PROCEDURE — 93005 ELECTROCARDIOGRAM TRACING: CPT | Mod: TC | Performed by: NURSE PRACTITIONER

## 2025-08-25 PROCEDURE — 160031 HCHG SURGERY MINUTES - 1ST 30 MINS LEVEL 5: Performed by: INTERNAL MEDICINE

## 2025-08-25 PROCEDURE — 160048 HCHG OR STATISTICAL LEVEL 1-5: Performed by: INTERNAL MEDICINE

## 2025-08-25 PROCEDURE — 04UK0KZ SUPPLEMENT RIGHT FEMORAL ARTERY WITH NONAUTOLOGOUS TISSUE SUBSTITUTE, OPEN APPROACH: ICD-10-PCS | Performed by: SURGERY

## 2025-08-25 PROCEDURE — 700117 HCHG RX CONTRAST REV CODE 255: Performed by: SURGERY

## 2025-08-25 PROCEDURE — 503001 HCHG PERFUSION: Performed by: INTERNAL MEDICINE

## 2025-08-25 PROCEDURE — C1769 GUIDE WIRE: HCPCS | Performed by: INTERNAL MEDICINE

## 2025-08-25 PROCEDURE — 110454 HCHG SHELL REV 250: Performed by: INTERNAL MEDICINE

## 2025-08-25 PROCEDURE — 160192 HCHG ANESTHESIA COMPLEX: Performed by: INTERNAL MEDICINE

## 2025-08-25 PROCEDURE — 700105 HCHG RX REV CODE 258: Performed by: SURGERY

## 2025-08-25 PROCEDURE — 04CK0ZZ EXTIRPATION OF MATTER FROM RIGHT FEMORAL ARTERY, OPEN APPROACH: ICD-10-PCS | Performed by: SURGERY

## 2025-08-25 PROCEDURE — 80053 COMPREHEN METABOLIC PANEL: CPT

## 2025-08-25 PROCEDURE — 770020 HCHG ROOM/CARE - TELE (206)

## 2025-08-25 PROCEDURE — 160002 HCHG RECOVERY MINUTES (STAT): Performed by: INTERNAL MEDICINE

## 2025-08-25 PROCEDURE — 700111 HCHG RX REV CODE 636 W/ 250 OVERRIDE (IP): Mod: JZ | Performed by: STUDENT IN AN ORGANIZED HEALTH CARE EDUCATION/TRAINING PROGRAM

## 2025-08-25 PROCEDURE — C1894 INTRO/SHEATH, NON-LASER: HCPCS | Performed by: INTERNAL MEDICINE

## 2025-08-25 PROCEDURE — 83880 ASSAY OF NATRIURETIC PEPTIDE: CPT

## 2025-08-25 PROCEDURE — 160015 HCHG STAT PREOP MINUTES: Performed by: INTERNAL MEDICINE

## 2025-08-25 DEVICE — IMPLANTABLE DEVICE: Type: IMPLANTABLE DEVICE | Site: HEART | Status: FUNCTIONAL

## 2025-08-25 DEVICE — PATCH .8X8CM XENOSURE BIOLOGIC VASCULAR---ORDER IN MULTIPLES OF 5---: Type: IMPLANTABLE DEVICE | Site: GROIN | Status: FUNCTIONAL

## 2025-08-25 RX ORDER — SODIUM PHOSPHATE,MONO-DIBASIC 19G-7G/118
1 ENEMA (ML) RECTAL
Status: DISCONTINUED | OUTPATIENT
Start: 2025-08-25 | End: 2025-08-26 | Stop reason: HOSPADM

## 2025-08-25 RX ORDER — ONDANSETRON 2 MG/ML
4 INJECTION INTRAMUSCULAR; INTRAVENOUS EVERY 4 HOURS PRN
Status: DISCONTINUED | OUTPATIENT
Start: 2025-08-25 | End: 2025-08-26 | Stop reason: HOSPADM

## 2025-08-25 RX ORDER — FUROSEMIDE 10 MG/ML
40 INJECTION INTRAMUSCULAR; INTRAVENOUS DAILY
Status: DISCONTINUED | OUTPATIENT
Start: 2025-08-25 | End: 2025-08-26 | Stop reason: HOSPADM

## 2025-08-25 RX ORDER — ROCURONIUM BROMIDE 10 MG/ML
INJECTION, SOLUTION INTRAVENOUS PRN
Status: DISCONTINUED | OUTPATIENT
Start: 2025-08-25 | End: 2025-08-25 | Stop reason: SURG

## 2025-08-25 RX ORDER — POLYETHYLENE GLYCOL 3350 17 G/17G
1 POWDER, FOR SOLUTION ORAL 2 TIMES DAILY PRN
Status: DISCONTINUED | OUTPATIENT
Start: 2025-08-25 | End: 2025-08-26 | Stop reason: HOSPADM

## 2025-08-25 RX ORDER — HYDROMORPHONE HYDROCHLORIDE 1 MG/ML
0.4 INJECTION, SOLUTION INTRAMUSCULAR; INTRAVENOUS; SUBCUTANEOUS
Status: DISCONTINUED | OUTPATIENT
Start: 2025-08-25 | End: 2025-08-25 | Stop reason: HOSPADM

## 2025-08-25 RX ORDER — ALBUTEROL SULFATE 5 MG/ML
2.5 SOLUTION RESPIRATORY (INHALATION)
Status: DISCONTINUED | OUTPATIENT
Start: 2025-08-25 | End: 2025-08-25 | Stop reason: HOSPADM

## 2025-08-25 RX ORDER — HALOPERIDOL 5 MG/ML
1 INJECTION INTRAMUSCULAR
Status: DISCONTINUED | OUTPATIENT
Start: 2025-08-25 | End: 2025-08-25 | Stop reason: HOSPADM

## 2025-08-25 RX ORDER — OXYCODONE HCL 5 MG/5 ML
5 SOLUTION, ORAL ORAL
Status: DISCONTINUED | OUTPATIENT
Start: 2025-08-25 | End: 2025-08-25 | Stop reason: HOSPADM

## 2025-08-25 RX ORDER — ONDANSETRON 2 MG/ML
INJECTION INTRAMUSCULAR; INTRAVENOUS PRN
Status: DISCONTINUED | OUTPATIENT
Start: 2025-08-25 | End: 2025-08-25 | Stop reason: SURG

## 2025-08-25 RX ORDER — OLMESARTAN MEDOXOMIL 20 MG/1
20 TABLET ORAL DAILY
Status: DISCONTINUED | OUTPATIENT
Start: 2025-08-25 | End: 2025-08-26 | Stop reason: HOSPADM

## 2025-08-25 RX ORDER — BISACODYL 10 MG
10 SUPPOSITORY, RECTAL RECTAL
Status: DISCONTINUED | OUTPATIENT
Start: 2025-08-25 | End: 2025-08-26 | Stop reason: HOSPADM

## 2025-08-25 RX ORDER — HYDROMORPHONE HYDROCHLORIDE 1 MG/ML
0.2 INJECTION, SOLUTION INTRAMUSCULAR; INTRAVENOUS; SUBCUTANEOUS
Status: DISCONTINUED | OUTPATIENT
Start: 2025-08-25 | End: 2025-08-25 | Stop reason: HOSPADM

## 2025-08-25 RX ORDER — ROSUVASTATIN CALCIUM 20 MG/1
40 TABLET, COATED ORAL EVERY EVENING
Status: DISCONTINUED | OUTPATIENT
Start: 2025-08-25 | End: 2025-08-26 | Stop reason: HOSPADM

## 2025-08-25 RX ORDER — POTASSIUM CHLORIDE 1500 MG/1
20 TABLET, EXTENDED RELEASE ORAL DAILY
Status: DISCONTINUED | OUTPATIENT
Start: 2025-08-25 | End: 2025-08-26 | Stop reason: HOSPADM

## 2025-08-25 RX ORDER — HEPARIN SODIUM 1000 [USP'U]/ML
INJECTION, SOLUTION INTRAVENOUS; SUBCUTANEOUS PRN
Status: DISCONTINUED | OUTPATIENT
Start: 2025-08-25 | End: 2025-08-25 | Stop reason: SURG

## 2025-08-25 RX ORDER — DIPHENHYDRAMINE HYDROCHLORIDE 50 MG/ML
12.5 INJECTION, SOLUTION INTRAMUSCULAR; INTRAVENOUS
Status: DISCONTINUED | OUTPATIENT
Start: 2025-08-25 | End: 2025-08-25 | Stop reason: HOSPADM

## 2025-08-25 RX ORDER — OXYCODONE HYDROCHLORIDE 5 MG/1
5 TABLET ORAL EVERY 4 HOURS PRN
Refills: 0 | Status: DISCONTINUED | OUTPATIENT
Start: 2025-08-25 | End: 2025-08-26 | Stop reason: HOSPADM

## 2025-08-25 RX ORDER — HYDRALAZINE HYDROCHLORIDE 20 MG/ML
5 INJECTION INTRAMUSCULAR; INTRAVENOUS
Status: DISCONTINUED | OUTPATIENT
Start: 2025-08-25 | End: 2025-08-25 | Stop reason: HOSPADM

## 2025-08-25 RX ORDER — DEXAMETHASONE SODIUM PHOSPHATE 4 MG/ML
INJECTION, SOLUTION INTRA-ARTICULAR; INTRALESIONAL; INTRAMUSCULAR; INTRAVENOUS; SOFT TISSUE PRN
Status: DISCONTINUED | OUTPATIENT
Start: 2025-08-25 | End: 2025-08-25 | Stop reason: SURG

## 2025-08-25 RX ORDER — AMOXICILLIN 250 MG
1 CAPSULE ORAL
Status: DISCONTINUED | OUTPATIENT
Start: 2025-08-25 | End: 2025-08-26 | Stop reason: HOSPADM

## 2025-08-25 RX ORDER — HYDROMORPHONE HYDROCHLORIDE 1 MG/ML
0.1 INJECTION, SOLUTION INTRAMUSCULAR; INTRAVENOUS; SUBCUTANEOUS
Status: DISCONTINUED | OUTPATIENT
Start: 2025-08-25 | End: 2025-08-25 | Stop reason: HOSPADM

## 2025-08-25 RX ORDER — SODIUM CHLORIDE, SODIUM LACTATE, POTASSIUM CHLORIDE, CALCIUM CHLORIDE 600; 310; 30; 20 MG/100ML; MG/100ML; MG/100ML; MG/100ML
INJECTION, SOLUTION INTRAVENOUS CONTINUOUS
Status: DISCONTINUED | OUTPATIENT
Start: 2025-08-25 | End: 2025-08-25 | Stop reason: HOSPADM

## 2025-08-25 RX ORDER — DIPHENHYDRAMINE HYDROCHLORIDE 50 MG/ML
25 INJECTION, SOLUTION INTRAMUSCULAR; INTRAVENOUS EVERY 6 HOURS PRN
Status: DISCONTINUED | OUTPATIENT
Start: 2025-08-25 | End: 2025-08-26 | Stop reason: HOSPADM

## 2025-08-25 RX ORDER — SODIUM CHLORIDE 9 MG/ML
INJECTION, SOLUTION INTRAVENOUS CONTINUOUS
Status: ACTIVE | OUTPATIENT
Start: 2025-08-25 | End: 2025-08-25

## 2025-08-25 RX ORDER — DIPHENHYDRAMINE HCL 25 MG
25 TABLET ORAL NIGHTLY PRN
Status: DISCONTINUED | OUTPATIENT
Start: 2025-08-25 | End: 2025-08-26 | Stop reason: HOSPADM

## 2025-08-25 RX ORDER — BUPIVACAINE HYDROCHLORIDE 5 MG/ML
INJECTION, SOLUTION EPIDURAL; INTRACAUDAL; PERINEURAL
Status: DISCONTINUED | OUTPATIENT
Start: 2025-08-25 | End: 2025-08-25 | Stop reason: HOSPADM

## 2025-08-25 RX ORDER — LIDOCAINE HYDROCHLORIDE 40 MG/ML
SOLUTION TOPICAL PRN
Status: DISCONTINUED | OUTPATIENT
Start: 2025-08-25 | End: 2025-08-25 | Stop reason: SURG

## 2025-08-25 RX ORDER — ACETAMINOPHEN 325 MG/1
650 TABLET ORAL EVERY 6 HOURS PRN
Status: DISCONTINUED | OUTPATIENT
Start: 2025-08-25 | End: 2025-08-26 | Stop reason: HOSPADM

## 2025-08-25 RX ORDER — SODIUM CHLORIDE, SODIUM LACTATE, POTASSIUM CHLORIDE, CALCIUM CHLORIDE 600; 310; 30; 20 MG/100ML; MG/100ML; MG/100ML; MG/100ML
INJECTION, SOLUTION INTRAVENOUS
Status: DISCONTINUED | OUTPATIENT
Start: 2025-08-25 | End: 2025-08-25 | Stop reason: SURG

## 2025-08-25 RX ORDER — ASPIRIN 81 MG/1
81 TABLET ORAL DAILY
Status: DISCONTINUED | OUTPATIENT
Start: 2025-08-25 | End: 2025-08-26 | Stop reason: HOSPADM

## 2025-08-25 RX ORDER — OXYCODONE HCL 5 MG/5 ML
10 SOLUTION, ORAL ORAL
Status: DISCONTINUED | OUTPATIENT
Start: 2025-08-25 | End: 2025-08-25 | Stop reason: HOSPADM

## 2025-08-25 RX ORDER — CLOPIDOGREL BISULFATE 75 MG/1
75 TABLET ORAL DAILY
Status: DISCONTINUED | OUTPATIENT
Start: 2025-08-25 | End: 2025-08-26 | Stop reason: HOSPADM

## 2025-08-25 RX ORDER — LIDOCAINE HYDROCHLORIDE 20 MG/ML
INJECTION, SOLUTION EPIDURAL; INFILTRATION; INTRACAUDAL; PERINEURAL PRN
Status: DISCONTINUED | OUTPATIENT
Start: 2025-08-25 | End: 2025-08-25 | Stop reason: SURG

## 2025-08-25 RX ORDER — AMOXICILLIN 250 MG
1 CAPSULE ORAL NIGHTLY
Status: DISCONTINUED | OUTPATIENT
Start: 2025-08-25 | End: 2025-08-26 | Stop reason: HOSPADM

## 2025-08-25 RX ORDER — ONDANSETRON 2 MG/ML
4 INJECTION INTRAMUSCULAR; INTRAVENOUS
Status: DISCONTINUED | OUTPATIENT
Start: 2025-08-25 | End: 2025-08-25 | Stop reason: HOSPADM

## 2025-08-25 RX ORDER — HYDRALAZINE HYDROCHLORIDE 20 MG/ML
10 INJECTION INTRAMUSCULAR; INTRAVENOUS
Status: DISCONTINUED | OUTPATIENT
Start: 2025-08-25 | End: 2025-08-26 | Stop reason: HOSPADM

## 2025-08-25 RX ORDER — PROTAMINE SULFATE 10 MG/ML
INJECTION, SOLUTION INTRAVENOUS PRN
Status: DISCONTINUED | OUTPATIENT
Start: 2025-08-25 | End: 2025-08-25 | Stop reason: SURG

## 2025-08-25 RX ORDER — CEFAZOLIN SODIUM 1 G/3ML
INJECTION, POWDER, FOR SOLUTION INTRAMUSCULAR; INTRAVENOUS PRN
Status: DISCONTINUED | OUTPATIENT
Start: 2025-08-25 | End: 2025-08-25 | Stop reason: SURG

## 2025-08-25 RX ORDER — LABETALOL HYDROCHLORIDE 5 MG/ML
5 INJECTION, SOLUTION INTRAVENOUS
Status: DISCONTINUED | OUTPATIENT
Start: 2025-08-25 | End: 2025-08-25 | Stop reason: HOSPADM

## 2025-08-25 RX ORDER — SODIUM CHLORIDE, SODIUM LACTATE, POTASSIUM CHLORIDE, CALCIUM CHLORIDE 600; 310; 30; 20 MG/100ML; MG/100ML; MG/100ML; MG/100ML
INJECTION, SOLUTION INTRAVENOUS CONTINUOUS
Status: ACTIVE | OUTPATIENT
Start: 2025-08-25 | End: 2025-08-25

## 2025-08-25 RX ORDER — DOCUSATE SODIUM 100 MG/1
100 CAPSULE, LIQUID FILLED ORAL 2 TIMES DAILY
Status: DISCONTINUED | OUTPATIENT
Start: 2025-08-25 | End: 2025-08-26 | Stop reason: HOSPADM

## 2025-08-25 RX ORDER — EPHEDRINE SULFATE 50 MG/ML
INJECTION, SOLUTION INTRAVENOUS PRN
Status: DISCONTINUED | OUTPATIENT
Start: 2025-08-25 | End: 2025-08-25 | Stop reason: SURG

## 2025-08-25 RX ADMIN — POTASSIUM CHLORIDE 20 MEQ: 1500 TABLET, EXTENDED RELEASE ORAL at 17:04

## 2025-08-25 RX ADMIN — SUGAMMADEX 200 MG: 100 INJECTION, SOLUTION INTRAVENOUS at 09:27

## 2025-08-25 RX ADMIN — ROCURONIUM BROMIDE 20 MG: 10 INJECTION INTRAVENOUS at 08:51

## 2025-08-25 RX ADMIN — ACETAMINOPHEN 650 MG: 325 TABLET ORAL at 20:45

## 2025-08-25 RX ADMIN — EPHEDRINE SULFATE 5 MG: 50 INJECTION, SOLUTION INTRAVENOUS at 07:44

## 2025-08-25 RX ADMIN — FENTANYL CITRATE 50 MCG: 50 INJECTION, SOLUTION INTRAMUSCULAR; INTRAVENOUS at 09:11

## 2025-08-25 RX ADMIN — ASPIRIN 81 MG: 81 TABLET, COATED ORAL at 17:04

## 2025-08-25 RX ADMIN — EPHEDRINE SULFATE 5 MG: 50 INJECTION, SOLUTION INTRAVENOUS at 09:33

## 2025-08-25 RX ADMIN — FENTANYL CITRATE 50 MCG: 50 INJECTION, SOLUTION INTRAMUSCULAR; INTRAVENOUS at 08:05

## 2025-08-25 RX ADMIN — ACETAMINOPHEN 650 MG: 325 TABLET ORAL at 12:13

## 2025-08-25 RX ADMIN — ONDANSETRON 4 MG: 2 INJECTION INTRAMUSCULAR; INTRAVENOUS at 09:19

## 2025-08-25 RX ADMIN — EPHEDRINE SULFATE 5 MG: 50 INJECTION, SOLUTION INTRAVENOUS at 09:23

## 2025-08-25 RX ADMIN — OLMESARTAN MEDOXOMIL 20 MG: 20 TABLET, FILM COATED ORAL at 17:04

## 2025-08-25 RX ADMIN — DOCUSATE SODIUM 100 MG: 100 CAPSULE, LIQUID FILLED ORAL at 17:05

## 2025-08-25 RX ADMIN — EPHEDRINE SULFATE 5 MG: 50 INJECTION, SOLUTION INTRAVENOUS at 09:29

## 2025-08-25 RX ADMIN — HEPARIN SODIUM 9000 UNITS: 1000 INJECTION, SOLUTION INTRAVENOUS; SUBCUTANEOUS at 08:13

## 2025-08-25 RX ADMIN — SODIUM CHLORIDE: 9 INJECTION, SOLUTION INTRAVENOUS at 12:24

## 2025-08-25 RX ADMIN — LIDOCAINE HYDROCHLORIDE 100 MG: 20 INJECTION, SOLUTION EPIDURAL; INFILTRATION; INTRACAUDAL; PERINEURAL at 07:39

## 2025-08-25 RX ADMIN — EPHEDRINE SULFATE 5 MG: 50 INJECTION, SOLUTION INTRAVENOUS at 08:10

## 2025-08-25 RX ADMIN — OXYCODONE HYDROCHLORIDE 5 MG: 5 TABLET ORAL at 17:17

## 2025-08-25 RX ADMIN — PROPOFOL 150 MG: 10 INJECTION, EMULSION INTRAVENOUS at 07:39

## 2025-08-25 RX ADMIN — SODIUM CHLORIDE, POTASSIUM CHLORIDE, SODIUM LACTATE AND CALCIUM CHLORIDE: 600; 310; 30; 20 INJECTION, SOLUTION INTRAVENOUS at 07:34

## 2025-08-25 RX ADMIN — SENNOSIDES, DOCUSATE SODIUM 1 TABLET: 50; 8.6 TABLET, FILM COATED ORAL at 20:45

## 2025-08-25 RX ADMIN — CLOPIDOGREL BISULFATE 75 MG: 75 TABLET, FILM COATED ORAL at 17:05

## 2025-08-25 RX ADMIN — PROPOFOL 50 MG: 10 INJECTION, EMULSION INTRAVENOUS at 09:29

## 2025-08-25 RX ADMIN — PROTAMINE SULFATE 50 MG: 10 INJECTION, SOLUTION INTRAVENOUS at 09:19

## 2025-08-25 RX ADMIN — ROSUVASTATIN CALCIUM 40 MG: 20 TABLET, FILM COATED ORAL at 17:05

## 2025-08-25 RX ADMIN — HEPARIN SODIUM 3000 UNITS: 1000 INJECTION, SOLUTION INTRAVENOUS; SUBCUTANEOUS at 08:25

## 2025-08-25 RX ADMIN — DEXAMETHASONE SODIUM PHOSPHATE 8 MG: 4 INJECTION INTRA-ARTICULAR; INTRALESIONAL; INTRAMUSCULAR; INTRAVENOUS; SOFT TISSUE at 07:39

## 2025-08-25 RX ADMIN — ROCURONIUM BROMIDE 50 MG: 10 INJECTION INTRAVENOUS at 07:39

## 2025-08-25 RX ADMIN — EPHEDRINE SULFATE 5 MG: 50 INJECTION, SOLUTION INTRAVENOUS at 09:26

## 2025-08-25 RX ADMIN — HEPARIN SODIUM 3000 UNITS: 1000 INJECTION, SOLUTION INTRAVENOUS; SUBCUTANEOUS at 08:56

## 2025-08-25 RX ADMIN — FUROSEMIDE 40 MG: 10 INJECTION, SOLUTION INTRAVENOUS at 12:14

## 2025-08-25 RX ADMIN — CEFAZOLIN 2 G: 1 INJECTION, POWDER, FOR SOLUTION INTRAMUSCULAR; INTRAVENOUS at 07:39

## 2025-08-25 RX ADMIN — LIDOCAINE HYDROCHLORIDE 4 ML: 40 SOLUTION TOPICAL at 07:42

## 2025-08-25 ASSESSMENT — PAIN DESCRIPTION - PAIN TYPE
TYPE: SURGICAL PAIN
TYPE: CHRONIC PAIN
TYPE: SURGICAL PAIN
TYPE: SURGICAL PAIN
TYPE: ACUTE PAIN
TYPE: SURGICAL PAIN
TYPE: ACUTE PAIN
TYPE: SURGICAL PAIN

## 2025-08-25 ASSESSMENT — COGNITIVE AND FUNCTIONAL STATUS - GENERAL
SUGGESTED CMS G CODE MODIFIER DAILY ACTIVITY: CH
DAILY ACTIVITIY SCORE: 24
SUGGESTED CMS G CODE MODIFIER MOBILITY: CH
MOBILITY SCORE: 24

## 2025-08-25 ASSESSMENT — PATIENT HEALTH QUESTIONNAIRE - PHQ9
1. LITTLE INTEREST OR PLEASURE IN DOING THINGS: NOT AT ALL
SUM OF ALL RESPONSES TO PHQ9 QUESTIONS 1 AND 2: 0
2. FEELING DOWN, DEPRESSED, IRRITABLE, OR HOPELESS: NOT AT ALL

## 2025-08-25 ASSESSMENT — LIFESTYLE VARIABLES
HAVE YOU EVER FELT YOU SHOULD CUT DOWN ON YOUR DRINKING: NO
EVER HAD A DRINK FIRST THING IN THE MORNING TO STEADY YOUR NERVES TO GET RID OF A HANGOVER: NO
HAVE PEOPLE ANNOYED YOU BY CRITICIZING YOUR DRINKING: NO
CONSUMPTION TOTAL: NEGATIVE
ALCOHOL_USE: NO
TOTAL SCORE: 0
HOW MANY TIMES IN THE PAST YEAR HAVE YOU HAD 5 OR MORE DRINKS IN A DAY: 0
ON A TYPICAL DAY WHEN YOU DRINK ALCOHOL HOW MANY DRINKS DO YOU HAVE: 0
TOTAL SCORE: 0
TOTAL SCORE: 0
AVERAGE NUMBER OF DAYS PER WEEK YOU HAVE A DRINK CONTAINING ALCOHOL: 0
DOES PATIENT WANT TO STOP DRINKING: NO
EVER FELT BAD OR GUILTY ABOUT YOUR DRINKING: NO

## 2025-08-25 ASSESSMENT — FIBROSIS 4 INDEX: FIB4 SCORE: 2.21

## 2025-08-25 ASSESSMENT — PAIN SCALES - GENERAL: PAIN_LEVEL: 2

## 2025-08-26 ENCOUNTER — APPOINTMENT (OUTPATIENT)
Dept: RADIOLOGY | Facility: MEDICAL CENTER | Age: 70
DRG: 266 | End: 2025-08-26
Attending: NURSE PRACTITIONER
Payer: MEDICARE

## 2025-08-26 VITALS
HEIGHT: 64 IN | TEMPERATURE: 97.9 F | BODY MASS INDEX: 26.69 KG/M2 | HEART RATE: 64 BPM | SYSTOLIC BLOOD PRESSURE: 92 MMHG | OXYGEN SATURATION: 95 % | DIASTOLIC BLOOD PRESSURE: 63 MMHG | WEIGHT: 156.31 LBS | RESPIRATION RATE: 18 BRPM

## 2025-08-26 LAB
ALBUMIN SERPL BCP-MCNC: 3.8 G/DL (ref 3.2–4.9)
ALBUMIN/GLOB SERPL: 1.5 G/DL
ALP SERPL-CCNC: 59 U/L (ref 30–99)
ALT SERPL-CCNC: 14 U/L (ref 2–50)
ANION GAP SERPL CALC-SCNC: 11 MMOL/L (ref 7–16)
AST SERPL-CCNC: 20 U/L (ref 12–45)
BILIRUB SERPL-MCNC: 0.4 MG/DL (ref 0.1–1.5)
BUN SERPL-MCNC: 19 MG/DL (ref 8–22)
CALCIUM ALBUM COR SERPL-MCNC: 8.7 MG/DL (ref 8.5–10.5)
CALCIUM SERPL-MCNC: 8.5 MG/DL (ref 8.5–10.5)
CHLORIDE SERPL-SCNC: 105 MMOL/L (ref 96–112)
CO2 SERPL-SCNC: 21 MMOL/L (ref 20–33)
CREAT SERPL-MCNC: 0.75 MG/DL (ref 0.5–1.4)
EKG IMPRESSION: NORMAL
ERYTHROCYTE [DISTWIDTH] IN BLOOD BY AUTOMATED COUNT: 51.5 FL (ref 35.9–50)
GFR SERPLBLD CREATININE-BSD FMLA CKD-EPI: 86 ML/MIN/1.73 M 2
GLOBULIN SER CALC-MCNC: 2.5 G/DL (ref 1.9–3.5)
GLUCOSE SERPL-MCNC: 133 MG/DL (ref 65–99)
HCT VFR BLD AUTO: 35.2 % (ref 37–47)
HGB BLD-MCNC: 11.6 G/DL (ref 12–16)
MCH RBC QN AUTO: 28.7 PG (ref 27–33)
MCHC RBC AUTO-ENTMCNC: 33 G/DL (ref 32.2–35.5)
MCV RBC AUTO: 87.1 FL (ref 81.4–97.8)
NT-PROBNP SERPL IA-MCNC: 1565 PG/ML (ref 0–125)
PLATELET # BLD AUTO: 224 K/UL (ref 164–446)
PMV BLD AUTO: 10.3 FL (ref 9–12.9)
POTASSIUM SERPL-SCNC: 4.3 MMOL/L (ref 3.6–5.5)
PROT SERPL-MCNC: 6.3 G/DL (ref 6–8.2)
RBC # BLD AUTO: 4.04 M/UL (ref 4.2–5.4)
SODIUM SERPL-SCNC: 137 MMOL/L (ref 135–145)
WBC # BLD AUTO: 7.9 K/UL (ref 4.8–10.8)

## 2025-08-26 PROCEDURE — 97535 SELF CARE MNGMENT TRAINING: CPT

## 2025-08-26 PROCEDURE — 93005 ELECTROCARDIOGRAM TRACING: CPT | Mod: TC | Performed by: NURSE PRACTITIONER

## 2025-08-26 PROCEDURE — 700111 HCHG RX REV CODE 636 W/ 250 OVERRIDE (IP): Mod: JZ | Performed by: NURSE PRACTITIONER

## 2025-08-26 PROCEDURE — 80053 COMPREHEN METABOLIC PANEL: CPT

## 2025-08-26 PROCEDURE — A9270 NON-COVERED ITEM OR SERVICE: HCPCS | Performed by: NURSE PRACTITIONER

## 2025-08-26 PROCEDURE — 83880 ASSAY OF NATRIURETIC PEPTIDE: CPT

## 2025-08-26 PROCEDURE — 700102 HCHG RX REV CODE 250 W/ 637 OVERRIDE(OP): Performed by: NURSE PRACTITIONER

## 2025-08-26 PROCEDURE — 36415 COLL VENOUS BLD VENIPUNCTURE: CPT

## 2025-08-26 PROCEDURE — 71045 X-RAY EXAM CHEST 1 VIEW: CPT

## 2025-08-26 PROCEDURE — 85027 COMPLETE CBC AUTOMATED: CPT

## 2025-08-26 PROCEDURE — 97162 PT EVAL MOD COMPLEX 30 MIN: CPT

## 2025-08-26 RX ORDER — ASPIRIN 81 MG/1
81 TABLET ORAL DAILY
Qty: 100 TABLET | Refills: 3 | Status: SHIPPED | OUTPATIENT
Start: 2025-08-27

## 2025-08-26 RX ADMIN — POTASSIUM CHLORIDE 20 MEQ: 1500 TABLET, EXTENDED RELEASE ORAL at 04:21

## 2025-08-26 RX ADMIN — ASPIRIN 81 MG: 81 TABLET, COATED ORAL at 04:21

## 2025-08-26 RX ADMIN — OLMESARTAN MEDOXOMIL 20 MG: 20 TABLET, FILM COATED ORAL at 04:21

## 2025-08-26 RX ADMIN — OXYCODONE HYDROCHLORIDE 5 MG: 5 TABLET ORAL at 10:03

## 2025-08-26 RX ADMIN — CLOPIDOGREL BISULFATE 75 MG: 75 TABLET, FILM COATED ORAL at 04:21

## 2025-08-26 RX ADMIN — DOCUSATE SODIUM 100 MG: 100 CAPSULE, LIQUID FILLED ORAL at 04:21

## 2025-08-26 RX ADMIN — FUROSEMIDE 40 MG: 10 INJECTION, SOLUTION INTRAVENOUS at 04:21

## 2025-08-26 ASSESSMENT — COGNITIVE AND FUNCTIONAL STATUS - GENERAL
MOBILITY SCORE: 23
SUGGESTED CMS G CODE MODIFIER MOBILITY: CI
CLIMB 3 TO 5 STEPS WITH RAILING: A LITTLE

## 2025-08-26 ASSESSMENT — FIBROSIS 4 INDEX: FIB4 SCORE: 1.65

## 2025-08-26 ASSESSMENT — PAIN DESCRIPTION - PAIN TYPE
TYPE: ACUTE PAIN
TYPE: ACUTE PAIN

## 2025-08-26 ASSESSMENT — GAIT ASSESSMENTS
DEVIATION: BRADYKINETIC;ANTALGIC
DISTANCE (FEET): 50
ASSISTIVE DEVICE: FRONT WHEEL WALKER
GAIT LEVEL OF ASSIST: SUPERVISED

## 2025-09-02 ENCOUNTER — APPOINTMENT (OUTPATIENT)
Dept: CARDIOLOGY | Facility: MEDICAL CENTER | Age: 70
End: 2025-09-02
Payer: MEDICARE

## 2025-09-09 ENCOUNTER — APPOINTMENT (OUTPATIENT)
Dept: MEDICAL GROUP | Facility: MEDICAL CENTER | Age: 70
End: 2025-09-09
Payer: MEDICARE

## (undated) DEVICE — SYR ANGIO CNRST INJ HI-PRS 3W 65 - (10EA/CA)"

## (undated) DEVICE — CANISTER SUCTION 3000ML MECHANICAL FILTER AUTO SHUTOFF MEDI-VAC NONSTERILE LF DISP (40EA/CA)

## (undated) DEVICE — Device

## (undated) DEVICE — KIT RETROFIT PROBE COVERS (24EA/BX)

## (undated) DEVICE — COVER LIGHT HANDLE ALC PLUS DISP (18EA/BX)

## (undated) DEVICE — GOWN SURGICAL XX-LARGE - (28EA/CA) SIRUS NON REINFORCED

## (undated) DEVICE — SUTURE GENERAL

## (undated) DEVICE — BLADE SURGICAL #11 - (50/BX)

## (undated) DEVICE — SHEATH RO 6F 25CM (10EA/BX)

## (undated) DEVICE — DRAPE CLEAR W/ELASTIC BAND RAD CARM 40 X40" (20EA/CA)"

## (undated) DEVICE — SUCTION INSTRUMENT YANKAUER BULBOUS TIP W/O VENT (50EA/CA)

## (undated) DEVICE — GLOVE BIOGEL SZ 8.5 SURGICAL PF LTX - (50PR/BX 4BX/CA)

## (undated) DEVICE — SYRINGE 20 ML LL (50EA/BX 4BX/CA)

## (undated) DEVICE — ELECTRODE DUAL RETURN W/ CORD - (50/PK)

## (undated) DEVICE — INTRODUCER SHEATH 6FR 2.5CM - DILATOR PROTRUDING (10/BX)

## (undated) DEVICE — GUIDEWIRE HYDROPHILIC COATED ANGLED TIP .035 X 260CM (5EA/BX)"

## (undated) DEVICE — CATHETER PIGTAIL 6FR 145 (5EA/BX)

## (undated) DEVICE — CATHETER 6FR AL1 100CM (5/BX)

## (undated) DEVICE — GLOVESZ 8.5 BIOGEL PI MICRO - PF LF (50PR/BX)

## (undated) DEVICE — SET EXTENSION WITH 2 PORTS (48EA/CA) ***PART #2C8610 IS A SUBSTITUTE*****

## (undated) DEVICE — COVER FOOT UNIVERSAL DISP. - (25EA/CA)

## (undated) DEVICE — SYSTEM PEEL & PLACE 13CM INCISIONS

## (undated) DEVICE — CABLE TEMPORARY PACING

## (undated) DEVICE — CRIMPER CATHETER EDWARDS DISPOSABLE (1EA)

## (undated) DEVICE — TOWELS CLOTH SURGICAL - (4/PK 20PK/CA)

## (undated) DEVICE — SUTURE CV

## (undated) DEVICE — GUIDEWIRE STARTER STRAIGHT FIXED CORE .035 150CM 4 STRAIGHT PTFE/HEPARIN COATED (10/BX)

## (undated) DEVICE — SYRINGE NON SAFETY 10 CC 21 GA X 1-1/2 IN (100/BX 4BX/CA)

## (undated) DEVICE — INTRODUCER CATHETER DILATOR PROTRUDING 8FR 2.5CM (10EA/BX)

## (undated) DEVICE — SUTURE 0 ETHIBOND CT-1 30 IN (36PK/BX)

## (undated) DEVICE — LACTATED RINGERS INJ 1000 ML - (14EA/CA 60CA/PF)

## (undated) DEVICE — GLOVE BIOGEL SZ 6 PF LATEX - (50EA/BX 4BX/CA)

## (undated) DEVICE — GLIDESHEATH SLENDER NITINOL KIT .021 GW 6FR 10CM SINGLE WALL

## (undated) DEVICE — TUBING CLEARLINK DUO-VENT - C-FLO (48EA/CA)

## (undated) DEVICE — PACK TAVR (3EA/CA)

## (undated) DEVICE — CHLORAPREP 26 ML APPLICATOR - ORANGE TINT(25/CA)

## (undated) DEVICE — WIRE GUIDE AES .035 260CM WITH 3MM J TIP"

## (undated) DEVICE — SHEATH DESTINATION WITH DILATOR 6FR 45CM

## (undated) DEVICE — SENSOR OXIMETER ADULT SPO2 RD SET (20EA/BX)

## (undated) DEVICE — IV TUBING HI-FLO RATE W/CLAMP (50/CA)

## (undated) DEVICE — DEVICE INFLATION ATRION NOVALFEX TRANSFEMORAL SYSTEM (1EA)

## (undated) DEVICE — STOPCOCK IV 400 PSI 3W ROT (50EA/BX)

## (undated) DEVICE — KIT SURGIFLO W/OUT THROMBIN - (6EA/BX)

## (undated) DEVICE — WIRE GUIDE LUNDQST.035X180 - TSMG-35-180-4-LES ORDER BY BOX (5EA/BX)

## (undated) DEVICE — GOWN SURGEONS LARGE - (32/CA)

## (undated) DEVICE — DRAPE MAYO STAND - (30/CA)

## (undated) DEVICE — ARM BAND RADIAL TR BAND (5EA/BX)